# Patient Record
Sex: FEMALE | Race: WHITE | NOT HISPANIC OR LATINO | Employment: FULL TIME | URBAN - METROPOLITAN AREA
[De-identification: names, ages, dates, MRNs, and addresses within clinical notes are randomized per-mention and may not be internally consistent; named-entity substitution may affect disease eponyms.]

---

## 2017-01-11 ENCOUNTER — PRENATAL OFFICE VISIT (OUTPATIENT)
Dept: FAMILY MEDICINE | Facility: CLINIC | Age: 32
End: 2017-01-11
Payer: COMMERCIAL

## 2017-01-11 ENCOUNTER — HOSPITAL ENCOUNTER (OUTPATIENT)
Dept: ULTRASOUND IMAGING | Facility: CLINIC | Age: 32
Discharge: HOME | End: 2017-01-11
Attending: PHYSICIAN ASSISTANT | Admitting: PHYSICIAN ASSISTANT
Payer: COMMERCIAL

## 2017-01-11 VITALS
RESPIRATION RATE: 16 BRPM | BODY MASS INDEX: 31.08 KG/M2 | HEART RATE: 100 BPM | HEIGHT: 67 IN | WEIGHT: 198 LBS | DIASTOLIC BLOOD PRESSURE: 60 MMHG | SYSTOLIC BLOOD PRESSURE: 112 MMHG | TEMPERATURE: 97.9 F | OXYGEN SATURATION: 98 %

## 2017-01-11 DIAGNOSIS — O09.293 HISTORY OF OLIGOHYDRAMNIOS IN PRIOR PREGNANCY, CURRENTLY PREGNANT IN THIRD TRIMESTER: ICD-10-CM

## 2017-01-11 DIAGNOSIS — Z34.80 ENCOUNTER FOR SUPERVISION OF OTHER NORMAL PREGNANCY, UNSPECIFIED TRIMESTER: Primary | ICD-10-CM

## 2017-01-11 PROCEDURE — 76819 FETAL BIOPHYS PROFIL W/O NST: CPT

## 2017-01-11 PROCEDURE — 99207 ZZC PRENATAL VISIT: CPT | Performed by: FAMILY MEDICINE

## 2017-01-11 ASSESSMENT — PAIN SCALES - GENERAL: PAINLEVEL: NO PAIN (0)

## 2017-01-11 NOTE — PROGRESS NOTES
Taking PNVs, no problems. She had some pain and a HA yesterday but both have resolved today.  Her BPP was 8/8 today.  She is interested in having a sterilization procedure after her delivery this time.  This is her 3rd boy and she and her  have decided not to have any more children regardless of the fact that they have no girls.     SUBJECTIVE:  Paulette Singer is in for a consult for a tubal ligation, postpartum.  She is a   patient of Sona Borja [facility] who is referred to me by her.      ASSESSMENT:  1) 31 year old G 3,  at approximately 33-3/7 weeks gestation   with an EDC of 17.    2)  Undesired fertility.     RECOMMENDATIONS:   Postpartum bilateral tubal ligation through minilaparotomy with partial   salpingectomy.        If she were to have a primary low transverse  delivery for   whatever reason we could tie her tubes at that time also.      HISTORY:    31 year old multip who has had 2 previous vaginal deliveries, one in  at 41 wks and one in  at 35 wks due to oligohydramnios.    The patient is certain she does not want to have any more children after this delivery .  She and her  have talked about this and he is hesitant to have a vasectomy so she wants to do the tubal   ligation.  They do not want to use other forms of birth control and rechecked that including condoms, the pill, injection and IUD.      MEDICAL HISTORY  Past Medical History   Diagnosis Date     Migraines age 17     migraine/sinus headaches, better with excedrin     Plantar fasciitis 2009     See podiatry consult     Headache 2007     Low ferritin 2016     3rd pregnancy       SURGICAL HISTORY   Past Surgical History   Procedure Laterality Date     Hc ugi endoscopy, simple exam  2007     Pelvis laparoscopy,dx  2009     normal     Insert intrauterine device  2009     Mirena IUD under anesthetic     Hernia repair, inguinal rt/lt       bilateral as a baby     Mouth surgery        [place] teeth       ALLERGIES  Amoxicillin and Penicillins    CURRENT MEDICATIONS    Current outpatient prescriptions:      Prenatal Vit-Fe Fumarate-FA (PRENATAL MULTIVITAMIN  PLUS IRON) 27-0.8 MG TABS, Take 1 tablet by mouth daily, Disp: 100 tablet, Rfl: 3    SOCIAL HISTORY  Social History     Social History     Marital Status:      Spouse Name: N/A     Number of Children: 0     Years of Education: N/A     Occupational History     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 No      Service No     Blood Transfusions No     Caffeine Concern No     Occupational Exposure Yes     Holistic healing     Hobby Hazards No     Sleep Concern No     Stress Concern No     Weight Concern No     Special Diet No     Back Care No     Exercise Yes     Seat Belt Yes     Social History Narrative    Lives in [place] with , Timur and their two sons.  No concerns about domestic violence.  No smokers in the home.  No indoor cats/kittens.       FAMILY HISTORY  Family History   Problem Relation Age of Onset     Depression Mother      Thyroid Disease Mother      CANCER Paternal Grandmother      eye     Gynecology Sister      endometriosis       IMMUNIZATIONS  Immunization History   Administered Date(s) Administered     DTAP (<7y) 1987, 1985, 06/23/1988, 06/04/1990, 06/29/1992     IPV 1987, 1987, 06/04/1990, 06/29/1992     MMR 06/04/1990, 04/29/1999     TD (ADULT, 7+) 04/29/1999     TDAP (ADACEL AGES 11-64) 08/27/2009       I went through the risks of tubal ligation including  but not limited to pain and discomfort, estimated blood loss which is usually less than 5-10 cc, risk of infection, injury to the tube, uterus or other internal organs.      She was made aware that the main risk of a tubal ligation, no matter how well it is done is that of pregnancy, and that risk is higher for women who are less than 35 years of age.  The risk overall is anywhere from .3% or 3 out of 1000 women all the way up to 2 out of 1000 or 2%.      Of those women that get pregnant, 50% get pregnant in the tube itself or a tubal pregnancy and need a second surgical procedure.  She was aware that if she should miss a period or have any signs or symptoms of pregnancy, she should have a pregnancy test done.  If it is positive, she will need a beta hCG to quantify that and then a pelvic ultrasound to look for the location of the pregnancy.      Patient is willing to take these risks.  She does not want to have any more children.  She is also aware that to decrease her risk for getting pregnant she could use alternate forms of birth control during her most fertile period which would be 2-3 days on either side of ovulation which is anywhere from day 11 to day 17.  I showed the patient where the incision would be made, measuring approximately 1-2 inches in length, depending on patient's anatomy.  She is aware that her discomfort will be somewhat around the incision itself and then also laterally along the adnexal regions where the tubes would lie after completion of the tubal.    DISCUSSION:   Multiparous patient at.age requesting permanent sterilization.    She signed consent forms for her tubal ligation including the Federal form and those will be on file up in Labor and Delivery.  If she has further questions she will first talk with Sona Borja PAC and then follow up with me if he can not answer her questions.  She will otherwise follow up with Sona Borja PAC for the remainder of her prenatal care. I  will be there for her delivery and then schedule her for a PPBTL for the following day.  If for some reason she ends up having a ILTCS for delivery we can do the tubal at the same time.     Electronically signed by:  Rah Hill M.D.  1/11/2017

## 2017-01-11 NOTE — NURSING NOTE
"Chief Complaint   Patient presents with     Prenatal Care       Initial /60 mmHg  Pulse 100  Temp(Src) 97.9  F (36.6  C) (Temporal)  Resp 16  Ht 5' 7\" (1.702 m)  Wt 198 lb (89.812 kg)  BMI 31.00 kg/m2  SpO2 98%  LMP 05/12/2016 (Approximate) Estimated body mass index is 31 kg/(m^2) as calculated from the following:    Height as of this encounter: 5' 7\" (1.702 m).    Weight as of this encounter: 198 lb (89.812 kg).  BP completed using cuff size: robin Barrios MA 1/11/2017        "

## 2017-01-18 ENCOUNTER — HOSPITAL ENCOUNTER (OUTPATIENT)
Dept: ULTRASOUND IMAGING | Facility: CLINIC | Age: 32
Discharge: HOME | End: 2017-01-18
Attending: PHYSICIAN ASSISTANT | Admitting: PHYSICIAN ASSISTANT
Payer: COMMERCIAL

## 2017-01-18 DIAGNOSIS — O09.293 HISTORY OF OLIGOHYDRAMNIOS IN PRIOR PREGNANCY, CURRENTLY PREGNANT IN THIRD TRIMESTER: ICD-10-CM

## 2017-01-18 PROCEDURE — 76819 FETAL BIOPHYS PROFIL W/O NST: CPT

## 2017-01-25 ENCOUNTER — HOSPITAL ENCOUNTER (OUTPATIENT)
Dept: ULTRASOUND IMAGING | Facility: CLINIC | Age: 32
Discharge: HOME | End: 2017-01-25
Attending: PHYSICIAN ASSISTANT | Admitting: PHYSICIAN ASSISTANT
Payer: COMMERCIAL

## 2017-01-25 ENCOUNTER — PRENATAL OFFICE VISIT (OUTPATIENT)
Dept: FAMILY MEDICINE | Facility: CLINIC | Age: 32
End: 2017-01-25
Payer: COMMERCIAL

## 2017-01-25 VITALS
RESPIRATION RATE: 16 BRPM | SYSTOLIC BLOOD PRESSURE: 120 MMHG | HEART RATE: 88 BPM | TEMPERATURE: 97.8 F | BODY MASS INDEX: 31.63 KG/M2 | WEIGHT: 202 LBS | DIASTOLIC BLOOD PRESSURE: 70 MMHG

## 2017-01-25 DIAGNOSIS — Z34.80 ENCOUNTER FOR SUPERVISION OF OTHER NORMAL PREGNANCY, UNSPECIFIED TRIMESTER: Primary | ICD-10-CM

## 2017-01-25 DIAGNOSIS — O09.293 HISTORY OF OLIGOHYDRAMNIOS IN PRIOR PREGNANCY, CURRENTLY PREGNANT IN THIRD TRIMESTER: ICD-10-CM

## 2017-01-25 PROCEDURE — 59025 FETAL NON-STRESS TEST: CPT | Performed by: PHYSICIAN ASSISTANT

## 2017-01-25 PROCEDURE — 76819 FETAL BIOPHYS PROFIL W/O NST: CPT

## 2017-01-25 PROCEDURE — 99207 ZZC PRENATAL VISIT: CPT | Performed by: PHYSICIAN ASSISTANT

## 2017-01-25 ASSESSMENT — PAIN SCALES - GENERAL: PAINLEVEL: NO PAIN (0)

## 2017-01-25 NOTE — PROGRESS NOTES
"Doing well.  No concerns today. Continues to take PNV.  She met with Dr. Hill last OB visit and also discussed postpartum tubal ligation with him-signed consent forms for this. Today states  will likely have a vasectomy after learning all details surrounding tubal ligation.  NST today reactive 2/2. BPP done prior to my visit - awaiting interpretation - per patient baby did not breathe well & was told the BPP score was 6/8.  I would like to see the final radiology interpretation to make a final plan for follow up.  No vaginal bleeding, leakage, or contractions.  Baby's movement is active and frequent.  Discussed signs of labor and when to call or come in.   Discussed kick counts and fetal movement.  GBS next visit.  Please call if persistent HA, blurred vision, or swelling  She will report to OB if contractions every 5-10 minutes or if rupture of membranes.  RTC in 1 week    Son dx with pneumonia Monday - her lungs are feeling \"heavy\" with slight cough. No fevers, nightsweats. No hx of lung problems. Nonsmoker.  Lungs are clear today, mildly laryngitic. Likely a viral illness - tx supportively.    Electronically signed by Soan Borja PA-C  1/25/2017      "

## 2017-01-25 NOTE — NURSING NOTE
"Chief Complaint   Patient presents with     Prenatal Care     35w3d       Initial /70 mmHg  Pulse 88  Temp(Src) 97.8  F (36.6  C) (Tympanic)  Resp 16  Wt 202 lb (91.627 kg)  LMP 05/12/2016 (Approximate) Estimated body mass index is 31.63 kg/(m^2) as calculated from the following:    Height as of 1/11/17: 5' 7\" (1.702 m).    Weight as of this encounter: 202 lb (91.627 kg).  BP completed using cuff size: jay Landaverde CMA (AAMA)   "

## 2017-01-25 NOTE — PROGRESS NOTES
Quick Note:    Paulette - your biophysical returned with the findings discussed at your visit. Because this was not a perfect biophysical, but the nonstress tests look good, would like to repeat both studies on Friday. One of my partners should see you on Friday to do the nonstress test. I would like the BPP done prior to the visit if possible. I will have Francoise - my care  call you with a visit time.    Can you have her see one of the physician's who works on Friday - will need visit, NST & BPP (prior to visit).  ______

## 2017-01-26 ENCOUNTER — MYC MEDICAL ADVICE (OUTPATIENT)
Dept: FAMILY MEDICINE | Facility: CLINIC | Age: 32
End: 2017-01-26

## 2017-01-26 ENCOUNTER — TELEPHONE (OUTPATIENT)
Dept: FAMILY MEDICINE | Facility: CLINIC | Age: 32
End: 2017-01-26

## 2017-01-26 DIAGNOSIS — O09.293 HISTORY OF OLIGOHYDRAMNIOS IN PRIOR PREGNANCY, CURRENTLY PREGNANT IN THIRD TRIMESTER: Primary | ICD-10-CM

## 2017-01-26 NOTE — TELEPHONE ENCOUNTER
----- Message from Sona Borja PA-C sent at 1/25/2017  5:27 PM CST -----  Paulette - your biophysical returned with the findings discussed at your visit. Because this was not a perfect biophysical, but the nonstress tests look good, would like to repeat both studies on Friday. One of my partners should see you on Friday to do the nonstress test.  I would like the BPP done prior to the visit if possible. I will have Francoise - my care  call you with a visit time.    Can you have her see one of the physician's who works on Friday - will need visit, NST & BPP (prior to visit).

## 2017-01-27 ENCOUNTER — PRENATAL OFFICE VISIT (OUTPATIENT)
Dept: FAMILY MEDICINE | Facility: CLINIC | Age: 32
End: 2017-01-27
Payer: COMMERCIAL

## 2017-01-27 ENCOUNTER — HOSPITAL ENCOUNTER (OUTPATIENT)
Dept: ULTRASOUND IMAGING | Facility: CLINIC | Age: 32
Discharge: HOME | End: 2017-01-27
Attending: PHYSICIAN ASSISTANT | Admitting: PHYSICIAN ASSISTANT
Payer: COMMERCIAL

## 2017-01-27 VITALS
SYSTOLIC BLOOD PRESSURE: 120 MMHG | WEIGHT: 210 LBS | HEART RATE: 120 BPM | DIASTOLIC BLOOD PRESSURE: 78 MMHG | RESPIRATION RATE: 18 BRPM | TEMPERATURE: 98.4 F | OXYGEN SATURATION: 98 % | BODY MASS INDEX: 32.88 KG/M2

## 2017-01-27 DIAGNOSIS — O09.293 HISTORY OF OLIGOHYDRAMNIOS IN PRIOR PREGNANCY, CURRENTLY PREGNANT IN THIRD TRIMESTER: ICD-10-CM

## 2017-01-27 DIAGNOSIS — O09.293 HISTORY OF OLIGOHYDRAMNIOS IN PRIOR PREGNANCY, CURRENTLY PREGNANT IN THIRD TRIMESTER: Primary | ICD-10-CM

## 2017-01-27 DIAGNOSIS — Z34.80 ENCOUNTER FOR SUPERVISION OF OTHER NORMAL PREGNANCY, UNSPECIFIED TRIMESTER: ICD-10-CM

## 2017-01-27 PROCEDURE — 59025 FETAL NON-STRESS TEST: CPT | Performed by: FAMILY MEDICINE

## 2017-01-27 PROCEDURE — 99207 ZZC COMPLICATED OB VISIT: CPT | Performed by: FAMILY MEDICINE

## 2017-01-27 PROCEDURE — 76819 FETAL BIOPHYS PROFIL W/O NST: CPT

## 2017-01-27 ASSESSMENT — PAIN SCALES - GENERAL: PAINLEVEL: NO PAIN (0)

## 2017-01-27 NOTE — NURSING NOTE
"Chief Complaint   Patient presents with     Prenatal Care       Initial LMP 05/12/2016 (Approximate) Estimated body mass index is 31.63 kg/(m^2) as calculated from the following:    Height as of 1/11/17: 5' 7\" (1.702 m).    Weight as of 1/25/17: 202 lb (91.627 kg).  BP completed using cuff size: robin Peralta MA      "

## 2017-01-27 NOTE — PROGRESS NOTES
On 1-25-17 - Paulette had a reactive NST with a biophysical profile score of 6/8 (points off for breathing) - was requested by PCP to have follow up in 2 days, and the PCP is not in clinic today.  Doing well.  No concerns today.  Discussed signs of labor and when to call or come in.  Discussed kick counts and fetal movement.  NST done today and was reactive.  Biophysical profile ordered and reported to patient as 8/8 by sonographer.  Final report pending.    Reportable signs and symptoms discussed.  follow-up in with LACEY Hernandez for next OB Visit next week.    This document serves as a record of the services and decisions personally performed and made by Gino Zarate MD.It was created on his behalf by Vianca Prather,  trained medical scribes. The creation of this document is based the provider's statements to the medical scribe. January 27, 2017 12:09 PM    The information in this document, created by the medical scribe for me, accurately reflects the services I personally performed and the decisions made by me. I have reviewed and approved this document for accuracy.     Gino Zarate MD   [facility]

## 2017-02-01 ENCOUNTER — PRENATAL OFFICE VISIT (OUTPATIENT)
Dept: FAMILY MEDICINE | Facility: CLINIC | Age: 32
End: 2017-02-01
Payer: COMMERCIAL

## 2017-02-01 ENCOUNTER — DOCUMENTATION ONLY (OUTPATIENT)
Dept: FAMILY MEDICINE | Facility: CLINIC | Age: 32
End: 2017-02-01

## 2017-02-01 ENCOUNTER — HOSPITAL ENCOUNTER (OUTPATIENT)
Dept: ULTRASOUND IMAGING | Facility: CLINIC | Age: 32
Discharge: HOME | End: 2017-02-01
Attending: PHYSICIAN ASSISTANT | Admitting: PHYSICIAN ASSISTANT
Payer: COMMERCIAL

## 2017-02-01 VITALS
HEART RATE: 80 BPM | DIASTOLIC BLOOD PRESSURE: 64 MMHG | SYSTOLIC BLOOD PRESSURE: 117 MMHG | WEIGHT: 202 LBS | BODY MASS INDEX: 31.63 KG/M2 | RESPIRATION RATE: 16 BRPM

## 2017-02-01 DIAGNOSIS — O09.293 HISTORY OF OLIGOHYDRAMNIOS IN PRIOR PREGNANCY, CURRENTLY PREGNANT IN THIRD TRIMESTER: ICD-10-CM

## 2017-02-01 DIAGNOSIS — Z34.80 ENCOUNTER FOR SUPERVISION OF OTHER NORMAL PREGNANCY, UNSPECIFIED TRIMESTER: Primary | ICD-10-CM

## 2017-02-01 PROCEDURE — 76819 FETAL BIOPHYS PROFIL W/O NST: CPT

## 2017-02-01 PROCEDURE — 87653 STREP B DNA AMP PROBE: CPT | Performed by: PHYSICIAN ASSISTANT

## 2017-02-01 PROCEDURE — 99207 ZZC PRENATAL VISIT: CPT | Performed by: PHYSICIAN ASSISTANT

## 2017-02-01 PROCEDURE — 59025 FETAL NON-STRESS TEST: CPT | Performed by: PHYSICIAN ASSISTANT

## 2017-02-01 ASSESSMENT — PAIN SCALES - GENERAL: PAINLEVEL: NO PAIN (0)

## 2017-02-01 NOTE — NURSING NOTE
"Chief Complaint   Patient presents with     Prenatal Care     36w3d       Initial /64 mmHg  Pulse 80  Resp 16  Wt 202 lb (91.627 kg)  LMP 05/12/2016 (Approximate) Estimated body mass index is 31.63 kg/(m^2) as calculated from the following:    Height as of 1/11/17: 5' 7\" (1.702 m).    Weight as of this encounter: 202 lb (91.627 kg).  BP completed using cuff size: regular  Denisse Landaverde CMA (AAMA)   "

## 2017-02-01 NOTE — PROGRESS NOTES
This patient was a no show for Lab today. I have canceled and put the orders in for 2 week. Please contact the patient. Mable LEWIS

## 2017-02-01 NOTE — PROGRESS NOTES
Doing well.  No concerns today. Continues to take PNV.  NST reactive 2/2.  BPP 8/8 today.  No vaginal bleeding, leakage, or contractions.  Baby's movement is active and frequent.  Discussed signs of labor and when to call or come in.   Discussed kick counts and fetal movement.  GBS was done today.  Serum labs including hemoglobin and antitreponema done today as well.  Please call if persistent HA, blurred vision, or swelling  She will report to OB if contractions every 5-10 minutes or if rupture of membranes.  RTC in 1 week    Electronically signed by Sona Borja PA-C  2/1/2017

## 2017-02-02 LAB
GP B STREP DNA SPEC QL NAA+PROBE: NORMAL
SPECIMEN SOURCE: NORMAL

## 2017-02-02 NOTE — PROGRESS NOTES
Quick Note:    Your GBS was negative. There are a couple of blood tests that we will do next week.  ______

## 2017-02-08 ENCOUNTER — HOSPITAL ENCOUNTER (OUTPATIENT)
Dept: ULTRASOUND IMAGING | Facility: CLINIC | Age: 32
Discharge: HOME | End: 2017-02-08
Attending: PHYSICIAN ASSISTANT | Admitting: PHYSICIAN ASSISTANT
Payer: COMMERCIAL

## 2017-02-08 ENCOUNTER — PRENATAL OFFICE VISIT (OUTPATIENT)
Dept: FAMILY MEDICINE | Facility: CLINIC | Age: 32
End: 2017-02-08
Payer: COMMERCIAL

## 2017-02-08 VITALS
SYSTOLIC BLOOD PRESSURE: 122 MMHG | WEIGHT: 204 LBS | DIASTOLIC BLOOD PRESSURE: 60 MMHG | RESPIRATION RATE: 16 BRPM | BODY MASS INDEX: 31.94 KG/M2 | HEART RATE: 78 BPM | TEMPERATURE: 98.1 F

## 2017-02-08 DIAGNOSIS — Z34.80 ENCOUNTER FOR SUPERVISION OF OTHER NORMAL PREGNANCY, UNSPECIFIED TRIMESTER: Primary | ICD-10-CM

## 2017-02-08 DIAGNOSIS — O09.293 HISTORY OF OLIGOHYDRAMNIOS IN PRIOR PREGNANCY, CURRENTLY PREGNANT IN THIRD TRIMESTER: ICD-10-CM

## 2017-02-08 LAB — HGB BLD-MCNC: 12.1 G/DL (ref 11.7–15.7)

## 2017-02-08 PROCEDURE — 99207 ZZC PRENATAL VISIT: CPT | Performed by: PHYSICIAN ASSISTANT

## 2017-02-08 PROCEDURE — 59025 FETAL NON-STRESS TEST: CPT | Performed by: PHYSICIAN ASSISTANT

## 2017-02-08 PROCEDURE — 00000218 ZZHCL STATISTIC OBHBG - HEMOGLOBIN: Performed by: PHYSICIAN ASSISTANT

## 2017-02-08 PROCEDURE — 86780 TREPONEMA PALLIDUM: CPT | Performed by: PHYSICIAN ASSISTANT

## 2017-02-08 PROCEDURE — 36415 COLL VENOUS BLD VENIPUNCTURE: CPT | Performed by: PHYSICIAN ASSISTANT

## 2017-02-08 PROCEDURE — 76819 FETAL BIOPHYS PROFIL W/O NST: CPT

## 2017-02-08 ASSESSMENT — PAIN SCALES - GENERAL: PAINLEVEL: MILD PAIN (2)

## 2017-02-08 NOTE — PROGRESS NOTES
Doing well.  No concerns today other than increased swelling lower legs. 1+ noted today. Nonpitting edema. Support stockings encouraged.  BPP 8/8 today & NST 2/2 reactive. (doing these due to previous hx of oligohydramnios with previous 2 pregnancies)   Continues to take PNV.  No vaginal bleeding, leakage, but did note some contractions q 2 min yesterday - tapered off into the evening.  Baby's movement is active and frequent.  Discussed signs of labor and when to call or come in.   Discussed kick counts and fetal movement.  GBS returned  negative.  Please call if persistent HA, blurred vision, or swelling  She will report to OB if contractions every 5-10 minutes or if rupture of membranes.  RTC in 1 week    Electronically signed by Sona Borja PA-C  2/8/2017

## 2017-02-08 NOTE — NURSING NOTE
"Chief Complaint   Patient presents with     Prenatal Care     37w3d       Initial /60 mmHg  Pulse 78  Temp(Src) 98.1  F (36.7  C) (Tympanic)  Resp 16  Wt 204 lb (92.534 kg)  LMP 05/12/2016 (Approximate) Estimated body mass index is 31.94 kg/(m^2) as calculated from the following:    Height as of 1/11/17: 5' 7\" (1.702 m).    Weight as of this encounter: 204 lb (92.534 kg).  Medication Reconciliation: complete   Dneisse Landaverde CMA (AAMA)   "

## 2017-02-09 LAB — T PALLIDUM IGG+IGM SER QL: NEGATIVE

## 2017-02-15 ENCOUNTER — HOSPITAL ENCOUNTER (OUTPATIENT)
Dept: ULTRASOUND IMAGING | Facility: CLINIC | Age: 32
Discharge: HOME | End: 2017-02-15
Attending: PHYSICIAN ASSISTANT | Admitting: PHYSICIAN ASSISTANT
Payer: COMMERCIAL

## 2017-02-15 ENCOUNTER — PRENATAL OFFICE VISIT (OUTPATIENT)
Dept: FAMILY MEDICINE | Facility: CLINIC | Age: 32
End: 2017-02-15
Payer: COMMERCIAL

## 2017-02-15 ENCOUNTER — TELEPHONE (OUTPATIENT)
Dept: OBGYN | Facility: CLINIC | Age: 32
End: 2017-02-15

## 2017-02-15 VITALS
WEIGHT: 207.5 LBS | TEMPERATURE: 98.6 F | SYSTOLIC BLOOD PRESSURE: 110 MMHG | BODY MASS INDEX: 32.5 KG/M2 | DIASTOLIC BLOOD PRESSURE: 72 MMHG | HEART RATE: 76 BPM

## 2017-02-15 DIAGNOSIS — O09.293 HISTORY OF OLIGOHYDRAMNIOS IN PRIOR PREGNANCY, CURRENTLY PREGNANT IN THIRD TRIMESTER: ICD-10-CM

## 2017-02-15 PROCEDURE — 76819 FETAL BIOPHYS PROFIL W/O NST: CPT

## 2017-02-15 PROCEDURE — 99207 ZZC PRENATAL VISIT: CPT | Performed by: PHYSICIAN ASSISTANT

## 2017-02-15 PROCEDURE — 59025 FETAL NON-STRESS TEST: CPT | Performed by: PHYSICIAN ASSISTANT

## 2017-02-15 ASSESSMENT — PAIN SCALES - GENERAL: PAINLEVEL: NO PAIN (0)

## 2017-02-15 NOTE — PROGRESS NOTES
Doing well.  No concerns today. BPP is 8/8 & NST is reactive 2/2.  She will be seeing Dr. Hill next 2 weeks. Continues to take PNV.  No vaginal bleeding, leakage, or contractions.  Baby's movement is active and frequent.  Discussed signs of labor and when to call or come in.   Discussed kick counts and fetal movement.  Please call if persistent HA, blurred vision, or swelling  She will report to OB if contractions every 5-10 minutes or if rupture of membranes.  RTC in 1 week    Electronically signed by Sona Borja PA-C  2/15/2017

## 2017-02-15 NOTE — NURSING NOTE
"Chief Complaint   Patient presents with     Prenatal Care     38w3d       Initial /72  Pulse 76  Temp 98.6  F (37  C) (Tympanic)  Wt 207 lb 8 oz (94.1 kg)  LMP 05/12/2016 (Approximate)  BMI 32.5 kg/m2 Estimated body mass index is 32.5 kg/(m^2) as calculated from the following:    Height as of 1/11/17: 5' 7\" (1.702 m).    Weight as of this encounter: 207 lb 8 oz (94.1 kg).  Medication Reconciliation: complete   Denisse Landaverde CMA (AAMA)   "

## 2017-02-15 NOTE — MR AVS SNAPSHOT
After Visit Summary   2/15/2017    Paulette Singer    MRN: [id number]           Patient Information     Date Of Birth          1985        Visit Information        Provider Department      2/15/2017 10:00 AM Sona Borja PA-C [facility]        Today's Diagnoses     History of oligohydramnios in prior pregnancy, currently pregnant in third trimester           Follow-ups after your visit        Your next 10 appointments already scheduled     Feb 22, 2017  9:00 AM CST   US FETAL BIOPHYS PROFILE W/O NON STRESS TEST with PHUS1   [facility] ([facility])    [address] [place] [address]   [phone]           Please bring a list of your medicines (including vitamins, minerals and over-the-counter drugs). Also, tell your doctor about any allergies you may have. Wear comfortable clothes and leave your valuables at home.  If you re less than 20 weeks drink four 8-ounce glasses of fluid an hour before your exam. If you need to empty your bladder before your exam, try to release only a little urine. Then, drink another glass of fluid.  You may have up to two family members in the exam room. If you bring a small child, an adult must be there to care for him or her.  Please call the Imaging Department at your exam site with any questions.            Feb 22, 2017  9:40 AM CST   ESTABLISHED PRENATAL with Rah Hill MD   [facility] ([facility]    [address] [facility] [address]   [phone]            Mar 01, 2017  9:15 AM CST   US FETAL BIOPHYS PROFILE W/O NON STRESS TEST with PHUS1   [facility] ([facility])    [address] [place] [address]   [phone]           Please bring a list of your medicines (including vitamins, minerals and over-the-counter drugs). Also, tell your doctor about any allergies you may have. Wear comfortable  clothes and leave your valuables at home.  If you re less than 20 weeks drink four 8-ounce glasses of fluid an hour before your exam. If you need to empty your bladder before your exam, try to release only a little urine. Then, drink another glass of fluid.  You may have up to two family members in the exam room. If you bring a small child, an adult must be there to care for him or her.  Please call the Imaging Department at your exam site with any questions.            Mar 01, 2017 10:20 AM CST   ESTABLISHED PRENATAL with Rah Hill MD   [facility]    [address] [place] [id number] [phone]              Who to contact     If you have questions or need follow up information about today's clinic visit or your schedule please contact [facility] directly at [phone].  Normal or non-critical lab and imaging results will be communicated to you by MyChart, letter or phone within 4 business days after the clinic has received the results. If you do not hear from us within 7 days, please contact the clinic through [employer] or phone. If you have a critical or abnormal lab result, we will notify you by phone as soon as possible.  Submit refill requests through "[employer] or call your pharmacy and they will forward the refill request to us. Please allow 3 business days for your refill to be completed.          Additional Information About Your Visit        [employer] Information     "[employer] gives you secure access to your electronic health record. If you see a primary care provider, you can also send messages to your care team and make appointments. If you have questions, please call your primary care clinic.  If you do not have a primary care provider, please call [phone] and they will assist you.        Care EveryWhere ID     This is your Care EveryWhere ID. This could be used by other organizations to access your [facility]  records  [id number]        Your Vitals Were     Pulse Temperature Last Period BMI (Body Mass Index)          76 98.6  F (37  C) (Tympanic) 05/12/2016 (Approximate) 32.5 kg/m2         Blood Pressure from Last 3 Encounters:   02/15/17 110/72   02/08/17 122/60   02/01/17 117/64    Weight from Last 3 Encounters:   02/15/17 207 lb 8 oz (94.1 kg)   02/08/17 204 lb (92.5 kg)   02/01/17 202 lb (91.6 kg)              We Performed the Following     FETAL NON-STRESS TEST        Primary Care Provider Office Phone # Fax #    Sona Borja PA-C [phone]       [address] [place] [address]        Thank you!     Thank you for choosing [facility]  for your care. Our goal is always to provide you with excellent care. Hearing back from our patients is one way we can continue to improve our services. Please take a few minutes to complete the written survey that you may receive in the mail after your visit with us. Thank you!             Your Updated Medication List - Protect others around you: Learn how to safely use, store and throw away your medicines at www.disposemymeds.org.          This list is accurate as of: 2/15/17 12:44 PM.  Always use your most recent med list.                   Brand Name Dispense Instructions for use    prenatal multivitamin  plus iron 27-0.8 MG Tabs per tablet     100 tablet    Take 1 tablet by mouth daily

## 2017-02-16 ENCOUNTER — TELEPHONE (OUTPATIENT)
Dept: OBGYN | Facility: CLINIC | Age: 32
End: 2017-02-16

## 2017-02-16 NOTE — TELEPHONE ENCOUNTER
"Pt called stating \"I've been claudia all day but the contractions are not very strong\".  Denies bleeding, denies leaking of fluid & having good fetal movements.  After speaking with the pt twice, she has choose to stay home & do more of her labor at home.  Pt encouraged to call with any concerns, leaking of fluid or bleeding or when coming to the birthplace.  Pt agreed with plan of care.  "

## 2017-02-22 ENCOUNTER — PRENATAL OFFICE VISIT (OUTPATIENT)
Dept: FAMILY MEDICINE | Facility: CLINIC | Age: 32
End: 2017-02-22
Payer: COMMERCIAL

## 2017-02-22 VITALS
SYSTOLIC BLOOD PRESSURE: 120 MMHG | WEIGHT: 209 LBS | RESPIRATION RATE: 18 BRPM | OXYGEN SATURATION: 100 % | DIASTOLIC BLOOD PRESSURE: 78 MMHG | BODY MASS INDEX: 32.73 KG/M2 | HEART RATE: 100 BPM | TEMPERATURE: 98 F

## 2017-02-22 DIAGNOSIS — O09.293 HISTORY OF OLIGOHYDRAMNIOS IN PRIOR PREGNANCY, CURRENTLY PREGNANT IN THIRD TRIMESTER: ICD-10-CM

## 2017-02-22 PROCEDURE — 99207 ZZC COMPLICATED OB VISIT: CPT | Performed by: FAMILY MEDICINE

## 2017-02-22 PROCEDURE — 59025 FETAL NON-STRESS TEST: CPT | Performed by: FAMILY MEDICINE

## 2017-02-22 RX ORDER — ACETAMINOPHEN 325 MG/1
650 TABLET ORAL EVERY 4 HOURS PRN
Status: CANCELLED | OUTPATIENT
Start: 2017-02-22

## 2017-02-22 RX ORDER — IBUPROFEN 800 MG/1
800 TABLET, FILM COATED ORAL
Status: CANCELLED | OUTPATIENT
Start: 2017-02-22

## 2017-02-22 RX ORDER — OXYCODONE AND ACETAMINOPHEN 5; 325 MG/1; MG/1
1 TABLET ORAL
Status: CANCELLED | OUTPATIENT
Start: 2017-02-22

## 2017-02-22 RX ORDER — OXYTOCIN/0.9 % SODIUM CHLORIDE 30/500 ML
1-24 PLASTIC BAG, INJECTION (ML) INTRAVENOUS CONTINUOUS
Status: CANCELLED | OUTPATIENT
Start: 2017-02-22

## 2017-02-22 RX ORDER — LIDOCAINE 40 MG/G
CREAM TOPICAL
Status: CANCELLED | OUTPATIENT
Start: 2017-02-22

## 2017-02-22 RX ORDER — SODIUM CHLORIDE, SODIUM LACTATE, POTASSIUM CHLORIDE, CALCIUM CHLORIDE 600; 310; 30; 20 MG/100ML; MG/100ML; MG/100ML; MG/100ML
INJECTION, SOLUTION INTRAVENOUS CONTINUOUS
Status: CANCELLED | OUTPATIENT
Start: 2017-02-22

## 2017-02-22 RX ORDER — METHYLERGONOVINE MALEATE 0.2 MG/ML
200 INJECTION INTRAVENOUS
Status: CANCELLED | OUTPATIENT
Start: 2017-02-22

## 2017-02-22 RX ORDER — NALOXONE HYDROCHLORIDE 0.4 MG/ML
.1-.4 INJECTION, SOLUTION INTRAMUSCULAR; INTRAVENOUS; SUBCUTANEOUS
Status: CANCELLED | OUTPATIENT
Start: 2017-02-22

## 2017-02-22 RX ORDER — ONDANSETRON 2 MG/ML
4 INJECTION INTRAMUSCULAR; INTRAVENOUS EVERY 6 HOURS PRN
Status: CANCELLED | OUTPATIENT
Start: 2017-02-22

## 2017-02-22 RX ORDER — OXYTOCIN/0.9 % SODIUM CHLORIDE 30/500 ML
100-340 PLASTIC BAG, INJECTION (ML) INTRAVENOUS CONTINUOUS PRN
Status: CANCELLED | OUTPATIENT
Start: 2017-02-22

## 2017-02-22 RX ORDER — OXYTOCIN 10 [USP'U]/ML
10 INJECTION, SOLUTION INTRAMUSCULAR; INTRAVENOUS
Status: CANCELLED | OUTPATIENT
Start: 2017-02-22

## 2017-02-22 RX ORDER — CARBOPROST TROMETHAMINE 250 UG/ML
250 INJECTION, SOLUTION INTRAMUSCULAR
Status: CANCELLED | OUTPATIENT
Start: 2017-02-22

## 2017-02-22 NOTE — MR AVS SNAPSHOT
After Visit Summary   2/22/2017    Paulette Singer    MRN: [id number]           Patient Information     Date Of Birth          1985        Visit Information        Provider Department      2/22/2017 9:40 AM Rah Hill MD [facility]        Today's Diagnoses     History of oligohydramnios in prior pregnancy, currently pregnant in third trimester           Follow-ups after your visit        Your next 10 appointments already scheduled     Mar 01, 2017  9:15 AM CST   US FETAL BIOPHYS PROFILE W/O NON STRESS TEST with PHUS1   [facility]ound ([facility])    [address] [place] [id number] [phone]           Please bring a list of your medicines (including vitamins, minerals and over-the-counter drugs). Also, tell your doctor about any allergies you may have. Wear comfortable clothes and leave your valuables at home.  If you re less than 20 weeks drink four 8-ounce glasses of fluid an hour before your exam. If you need to empty your bladder before your exam, try to release only a little urine. Then, drink another glass of fluid.  You may have up to two family members in the exam room. If you bring a small child, an adult must be there to care for him or her.  Please call the Imaging Department at your exam site with any questions.            Mar 01, 2017 10:20 AM CST   ESTABLISHED PRENATAL with Rah Hill MD   [facility] ([facility]    [address] [facility] [id number] [phone]              Who to contact     If you have questions or need follow up information about today's clinic visit or your schedule please contact [facility] directly at [phone].  Normal or non-critical lab and imaging results will be communicated to you by MyChart, letter or phone within 4 business days after the clinic has received the results. If you do not hear from us within 7  days, please contact the clinic through [employer] or phone. If you have a critical or abnormal lab result, we will notify you by phone as soon as possible.  Submit refill requests through [employer] or call your pharmacy and they will forward the refill request to us. Please allow 3 business days for your refill to be completed.          Additional Information About Your Visit        [employer] Information     [employer] gives you secure access to your electronic health record. If you see a primary care provider, you can also send messages to your care team and make appointments. If you have questions, please call your primary care clinic.  If you do not have a primary care provider, please call [phone] and they will assist you.        Care EveryWhere ID     This is your Care EveryWhere ID. This could be used by other organizations to access your [facility] medical records  [id number]        Your Vitals Were     Pulse Temperature Respirations Last Period Pulse Oximetry BMI (Body Mass Index)    100 98  F (36.7  C) (Temporal) 18 05/12/2016 (Approximate) 100% 32.73 kg/m2       Blood Pressure from Last 3 Encounters:   02/22/17 120/78   02/15/17 110/72   02/08/17 122/60    Weight from Last 3 Encounters:   02/22/17 209 lb (94.8 kg)   02/15/17 207 lb 8 oz (94.1 kg)   02/08/17 204 lb (92.5 kg)              We Performed the Following     FETAL NON-STRESS TEST        Primary Care Provider Office Phone # Fax #    Sona Borja PA-C [phone]       [address] [place] [address]        Thank you!     Thank you for choosing [facility]  for your care. Our goal is always to provide you with excellent care. Hearing back from our patients is one way we can continue to improve our services. Please take a few minutes to complete the written survey that you may receive in the mail after your visit with us. Thank you!             Your Updated Medication List - Protect others  around you: Learn how to safely use, store and throw away your medicines at www.disposemymeds.org.          This list is accurate as of: 2/22/17 12:42 PM.  Always use your most recent med list.                   Brand Name Dispense Instructions for use    prenatal multivitamin  plus iron 27-0.8 MG Tabs per tablet     100 tablet    Take 1 tablet by mouth daily

## 2017-02-22 NOTE — NURSING NOTE
"Chief Complaint   Patient presents with     Prenatal Care     39 weeks 3 days       Initial /78 (BP Location: Right arm, Patient Position: Chair, Cuff Size: Adult Regular)  Pulse 100  Temp 98  F (36.7  C) (Temporal)  Resp 18  Wt 209 lb (94.8 kg)  LMP 05/12/2016 (Approximate)  SpO2 100%  BMI 32.73 kg/m2 Estimated body mass index is 32.73 kg/(m^2) as calculated from the following:    Height as of 1/11/17: 5' 7\" (1.702 m).    Weight as of this encounter: 209 lb (94.8 kg).  Medication Reconciliation: complete     Rukhsana Schneider CMA      "

## 2017-02-22 NOTE — PROGRESS NOTES
Taking PNVs, no problems.  She is miserable with pelvic relaxation and can barely get out of bed or up out of a chair.  She is requesting an induction.  She has had a history of Low AFIs and has been getting weekly BPPs.  Fluid has been low normal but adequate.  Her Valdez score today is 7 so has a favorable cervix for induction with pitocin.  We will do this tomorrow am and have her arrive at 06:30 am to L&D.  She is aware that with a Valdez score >6 there is no increased risk for induction and the outcomes will not change ie vaginal delivery vs CS.  She has had 2 previous vaginal deliveries so her risk is low for a failed induction.  Her NST today was nicely reactive with a baseline of 120 and multiple accelerations no decelerations. She was contraction almost every minute but not feeling them at all.  Signs and symptoms of labor were reviewed.      Electronically signed by:  Rah Hill M.D.  2/22/2017

## 2017-02-23 ENCOUNTER — HOSPITAL ENCOUNTER (INPATIENT)
Facility: CLINIC | Age: 32
LOS: 2 days | Discharge: HOME | End: 2017-02-25
Attending: FAMILY MEDICINE | Admitting: FAMILY MEDICINE
Payer: COMMERCIAL

## 2017-02-23 ENCOUNTER — ANESTHESIA EVENT (OUTPATIENT)
Dept: OBGYN | Facility: CLINIC | Age: 32
End: 2017-02-23
Payer: COMMERCIAL

## 2017-02-23 ENCOUNTER — ANESTHESIA (OUTPATIENT)
Dept: OBGYN | Facility: CLINIC | Age: 32
End: 2017-02-23
Payer: COMMERCIAL

## 2017-02-23 PROBLEM — Z34.90 PREGNANCY: Status: ACTIVE | Noted: 2017-02-23

## 2017-02-23 LAB
ABO + RH BLD: NORMAL
ABO + RH BLD: NORMAL
SPECIMEN EXP DATE BLD: NORMAL

## 2017-02-23 PROCEDURE — 25800025 ZZH RX 258: Performed by: FAMILY MEDICINE

## 2017-02-23 PROCEDURE — 10907ZC DRAINAGE OF AMNIOTIC FLUID, THERAPEUTIC FROM PRODUCTS OF CONCEPTION, VIA NATURAL OR ARTIFICIAL OPENING: ICD-10-PCS | Performed by: FAMILY MEDICINE

## 2017-02-23 PROCEDURE — 12000031 ZZH R&B OB CRITICAL

## 2017-02-23 PROCEDURE — 37000011 ZZH ANESTHESIA WARD SERVICE: Performed by: NURSE ANESTHETIST, CERTIFIED REGISTERED

## 2017-02-23 PROCEDURE — 59400 OBSTETRICAL CARE: CPT | Performed by: FAMILY MEDICINE

## 2017-02-23 PROCEDURE — 3E033VJ INTRODUCTION OF OTHER HORMONE INTO PERIPHERAL VEIN, PERCUTANEOUS APPROACH: ICD-10-PCS | Performed by: FAMILY MEDICINE

## 2017-02-23 PROCEDURE — 25800025 ZZH RX 258: Performed by: NURSE ANESTHETIST, CERTIFIED REGISTERED

## 2017-02-23 PROCEDURE — 86901 BLOOD TYPING SEROLOGIC RH(D): CPT | Performed by: FAMILY MEDICINE

## 2017-02-23 PROCEDURE — 40000671 ZZH STATISTIC ANESTHESIA CASE

## 2017-02-23 PROCEDURE — 25000125 ZZHC RX 250

## 2017-02-23 PROCEDURE — 3E0S3CZ INTRODUCTION OF REGIONAL ANESTHETIC INTO EPIDURAL SPACE, PERCUTANEOUS APPROACH: ICD-10-PCS | Performed by: FAMILY MEDICINE

## 2017-02-23 PROCEDURE — 00HU33Z INSERTION OF INFUSION DEVICE INTO SPINAL CANAL, PERCUTANEOUS APPROACH: ICD-10-PCS | Performed by: FAMILY MEDICINE

## 2017-02-23 PROCEDURE — 86900 BLOOD TYPING SEROLOGIC ABO: CPT | Performed by: FAMILY MEDICINE

## 2017-02-23 PROCEDURE — 25000125 ZZHC RX 250: Performed by: FAMILY MEDICINE

## 2017-02-23 PROCEDURE — 25000125 ZZHC RX 250: Performed by: NURSE ANESTHETIST, CERTIFIED REGISTERED

## 2017-02-23 PROCEDURE — 72200001 ZZH LABOR CARE VAGINAL DELIVERY SINGLE

## 2017-02-23 RX ORDER — IBUPROFEN 400 MG/1
400-800 TABLET, FILM COATED ORAL EVERY 6 HOURS PRN
Status: DISCONTINUED | OUTPATIENT
Start: 2017-02-23 | End: 2017-02-25 | Stop reason: HOSPADM

## 2017-02-23 RX ORDER — OXYTOCIN/0.9 % SODIUM CHLORIDE 30/500 ML
100-340 PLASTIC BAG, INJECTION (ML) INTRAVENOUS CONTINUOUS PRN
Status: DISCONTINUED | OUTPATIENT
Start: 2017-02-23 | End: 2017-02-23

## 2017-02-23 RX ORDER — METHYLERGONOVINE MALEATE 0.2 MG/ML
200 INJECTION INTRAVENOUS
Status: DISCONTINUED | OUTPATIENT
Start: 2017-02-23 | End: 2017-02-23

## 2017-02-23 RX ORDER — IBUPROFEN 800 MG/1
800 TABLET, FILM COATED ORAL
Status: DISCONTINUED | OUTPATIENT
Start: 2017-02-23 | End: 2017-02-23

## 2017-02-23 RX ORDER — LIDOCAINE HYDROCHLORIDE AND EPINEPHRINE 15; 5 MG/ML; UG/ML
INJECTION, SOLUTION EPIDURAL PRN
Status: DISCONTINUED | OUTPATIENT
Start: 2017-02-23 | End: 2017-02-24

## 2017-02-23 RX ORDER — NALOXONE HYDROCHLORIDE 0.4 MG/ML
.1-.4 INJECTION, SOLUTION INTRAMUSCULAR; INTRAVENOUS; SUBCUTANEOUS
Status: DISCONTINUED | OUTPATIENT
Start: 2017-02-23 | End: 2017-02-25 | Stop reason: HOSPADM

## 2017-02-23 RX ORDER — BISACODYL 10 MG
10 SUPPOSITORY, RECTAL RECTAL DAILY PRN
Status: DISCONTINUED | OUTPATIENT
Start: 2017-02-25 | End: 2017-02-25 | Stop reason: HOSPADM

## 2017-02-23 RX ORDER — IBUPROFEN 800 MG/1
800 TABLET, FILM COATED ORAL EVERY 6 HOURS PRN
Status: DISCONTINUED | OUTPATIENT
Start: 2017-02-23 | End: 2017-02-23

## 2017-02-23 RX ORDER — NALBUPHINE HYDROCHLORIDE 10 MG/ML
2.5-5 INJECTION, SOLUTION INTRAMUSCULAR; INTRAVENOUS; SUBCUTANEOUS EVERY 6 HOURS PRN
Status: DISCONTINUED | OUTPATIENT
Start: 2017-02-23 | End: 2017-02-23

## 2017-02-23 RX ORDER — SODIUM CHLORIDE, SODIUM LACTATE, POTASSIUM CHLORIDE, CALCIUM CHLORIDE 600; 310; 30; 20 MG/100ML; MG/100ML; MG/100ML; MG/100ML
INJECTION, SOLUTION INTRAVENOUS CONTINUOUS
Status: DISCONTINUED | OUTPATIENT
Start: 2017-02-23 | End: 2017-02-23

## 2017-02-23 RX ORDER — NALOXONE HYDROCHLORIDE 0.4 MG/ML
.1-.4 INJECTION, SOLUTION INTRAMUSCULAR; INTRAVENOUS; SUBCUTANEOUS
Status: DISCONTINUED | OUTPATIENT
Start: 2017-02-23 | End: 2017-02-23

## 2017-02-23 RX ORDER — ONDANSETRON 4 MG/1
4 TABLET, ORALLY DISINTEGRATING ORAL EVERY 6 HOURS PRN
Status: DISCONTINUED | OUTPATIENT
Start: 2017-02-23 | End: 2017-02-23

## 2017-02-23 RX ORDER — LANOLIN 100 %
OINTMENT (GRAM) TOPICAL
Status: DISCONTINUED | OUTPATIENT
Start: 2017-02-23 | End: 2017-02-25 | Stop reason: HOSPADM

## 2017-02-23 RX ORDER — LIDOCAINE HYDROCHLORIDE AND EPINEPHRINE 15; 5 MG/ML; UG/ML
3 INJECTION, SOLUTION EPIDURAL
Status: DISCONTINUED | OUTPATIENT
Start: 2017-02-23 | End: 2017-02-23

## 2017-02-23 RX ORDER — EPHEDRINE SULFATE 50 MG/ML
5 INJECTION, SOLUTION INTRAMUSCULAR; INTRAVENOUS; SUBCUTANEOUS
Status: DISCONTINUED | OUTPATIENT
Start: 2017-02-23 | End: 2017-02-23

## 2017-02-23 RX ORDER — OXYTOCIN/0.9 % SODIUM CHLORIDE 30/500 ML
1-24 PLASTIC BAG, INJECTION (ML) INTRAVENOUS CONTINUOUS
Status: DISCONTINUED | OUTPATIENT
Start: 2017-02-23 | End: 2017-02-23

## 2017-02-23 RX ORDER — LIDOCAINE HYDROCHLORIDE 10 MG/ML
INJECTION, SOLUTION EPIDURAL; INFILTRATION; INTRACAUDAL; PERINEURAL
Status: COMPLETED
Start: 2017-02-23 | End: 2017-02-23

## 2017-02-23 RX ORDER — BUPIVACAINE HYDROCHLORIDE 2.5 MG/ML
INJECTION, SOLUTION EPIDURAL; INFILTRATION; INTRACAUDAL PRN
Status: DISCONTINUED | OUTPATIENT
Start: 2017-02-23 | End: 2017-02-24

## 2017-02-23 RX ORDER — OXYTOCIN/0.9 % SODIUM CHLORIDE 30/500 ML
100 PLASTIC BAG, INJECTION (ML) INTRAVENOUS CONTINUOUS
Status: DISCONTINUED | OUTPATIENT
Start: 2017-02-23 | End: 2017-02-25 | Stop reason: HOSPADM

## 2017-02-23 RX ORDER — ACETAMINOPHEN 325 MG/1
650 TABLET ORAL EVERY 4 HOURS PRN
Status: DISCONTINUED | OUTPATIENT
Start: 2017-02-23 | End: 2017-02-23

## 2017-02-23 RX ORDER — OXYTOCIN/0.9 % SODIUM CHLORIDE 30/500 ML
340 PLASTIC BAG, INJECTION (ML) INTRAVENOUS CONTINUOUS PRN
Status: DISCONTINUED | OUTPATIENT
Start: 2017-02-23 | End: 2017-02-24

## 2017-02-23 RX ORDER — ONDANSETRON 2 MG/ML
4 INJECTION INTRAMUSCULAR; INTRAVENOUS EVERY 6 HOURS PRN
Status: DISCONTINUED | OUTPATIENT
Start: 2017-02-23 | End: 2017-02-23

## 2017-02-23 RX ORDER — ACETAMINOPHEN 325 MG/1
650 TABLET ORAL EVERY 4 HOURS PRN
Status: DISCONTINUED | OUTPATIENT
Start: 2017-02-23 | End: 2017-02-25 | Stop reason: HOSPADM

## 2017-02-23 RX ORDER — OXYCODONE AND ACETAMINOPHEN 5; 325 MG/1; MG/1
1 TABLET ORAL
Status: DISCONTINUED | OUTPATIENT
Start: 2017-02-23 | End: 2017-02-23

## 2017-02-23 RX ORDER — MISOPROSTOL 200 UG/1
400 TABLET ORAL
Status: DISCONTINUED | OUTPATIENT
Start: 2017-02-23 | End: 2017-02-24

## 2017-02-23 RX ORDER — OXYTOCIN 10 [USP'U]/ML
10 INJECTION, SOLUTION INTRAMUSCULAR; INTRAVENOUS
Status: DISCONTINUED | OUTPATIENT
Start: 2017-02-23 | End: 2017-02-23

## 2017-02-23 RX ORDER — CARBOPROST TROMETHAMINE 250 UG/ML
250 INJECTION, SOLUTION INTRAMUSCULAR
Status: DISCONTINUED | OUTPATIENT
Start: 2017-02-23 | End: 2017-02-23

## 2017-02-23 RX ORDER — OXYTOCIN 10 [USP'U]/ML
10 INJECTION, SOLUTION INTRAMUSCULAR; INTRAVENOUS
Status: DISCONTINUED | OUTPATIENT
Start: 2017-02-23 | End: 2017-02-24

## 2017-02-23 RX ORDER — AMOXICILLIN 250 MG
1-2 CAPSULE ORAL 2 TIMES DAILY
Status: DISCONTINUED | OUTPATIENT
Start: 2017-02-23 | End: 2017-02-25 | Stop reason: HOSPADM

## 2017-02-23 RX ORDER — LIDOCAINE 40 MG/G
CREAM TOPICAL
Status: DISCONTINUED | OUTPATIENT
Start: 2017-02-23 | End: 2017-02-23

## 2017-02-23 RX ORDER — HYDROCORTISONE 2.5 %
CREAM (GRAM) TOPICAL 3 TIMES DAILY PRN
Status: DISCONTINUED | OUTPATIENT
Start: 2017-02-23 | End: 2017-02-25 | Stop reason: HOSPADM

## 2017-02-23 RX ADMIN — BUPIVACAINE HYDROCHLORIDE 5 ML: 2.5 INJECTION, SOLUTION EPIDURAL; INFILTRATION; INTRACAUDAL at 14:26

## 2017-02-23 RX ADMIN — LIDOCAINE HYDROCHLORIDE,EPINEPHRINE BITARTRATE 3 ML: 15; .005 INJECTION, SOLUTION EPIDURAL; INFILTRATION; INTRACAUDAL; PERINEURAL at 14:24

## 2017-02-23 RX ADMIN — LIDOCAINE HYDROCHLORIDE 0.1 ML: 10 INJECTION, SOLUTION EPIDURAL; INFILTRATION; INTRACAUDAL; PERINEURAL at 07:00

## 2017-02-23 RX ADMIN — BUPIVACAINE HYDROCHLORIDE 5 ML: 2.5 INJECTION, SOLUTION EPIDURAL; INFILTRATION; INTRACAUDAL at 14:28

## 2017-02-23 RX ADMIN — Medication 10 ML/HR: at 14:34

## 2017-02-23 RX ADMIN — OXYTOCIN-SODIUM CHLORIDE 0.9% IV SOLN 30 UNIT/500ML 1 MILLI-UNITS/MIN: 30-0.9/5 SOLUTION at 07:54

## 2017-02-23 RX ADMIN — SODIUM CHLORIDE, POTASSIUM CHLORIDE, SODIUM LACTATE AND CALCIUM CHLORIDE: 600; 310; 30; 20 INJECTION, SOLUTION INTRAVENOUS at 07:08

## 2017-02-23 RX ADMIN — SODIUM CHLORIDE, POTASSIUM CHLORIDE, SODIUM LACTATE AND CALCIUM CHLORIDE: 600; 310; 30; 20 INJECTION, SOLUTION INTRAVENOUS at 13:55

## 2017-02-23 RX ADMIN — SODIUM CHLORIDE, POTASSIUM CHLORIDE, SODIUM LACTATE AND CALCIUM CHLORIDE 1000 ML: 600; 310; 30; 20 INJECTION, SOLUTION INTRAVENOUS at 14:05

## 2017-02-23 NOTE — PROGRESS NOTES
S:Pt presented [facility] the [facility] this morning for induction of labor.    B:  GBS-   A: Pt presents for pitocin induction of labor. Cervical exam by  yesterday in the clinic was 1cm.  Pt oriented to room. IV started.  Pitocin started.  has been up to evaluated the patient.   R: Will monitor closely.

## 2017-02-23 NOTE — H&P
"  2017    Paulette Singer  [id number]            OB Admit History & Physical      Ms. Singer is a  here at 39w4d for delivery under induction with planned .    Patient's last menstrual period was 2016 (approximate).   Her Estimated Date of Delivery: 2017, making her 39w4d  wks.      Estimated body mass index is 32.73 kg/(m^2) as calculated from the following:    Height as of 17: 5' 7\" (1.702 m).    Weight as of 17: 209 lb (94.8 kg).  Her prenatal course has been uncomplicated.    See prenatal for labs.  Negative GBBS, Rubella immune, RH positive    Estimated fetal weight = 7# 8oz       She is a 31 year old   Her OB history:   Obstetric History       T1      TAB0   SAB0   E0   M0   L2       # Outcome Date GA Lbr Garfield/2nd Weight Sex Delivery Anes PTL Lv   3 Current            2  10/20/14 35w0d  6 lb 1 oz (2.75 kg) M   Y Y      Name: Meño      Complications: Placental abruption   1 Term 10/27/12 41w0d  7 lb 14 oz (3.572 kg) M IVD  N Y      Name: Jamey      Obstetric Comments   EDC 2/15/2017 based on LMP.   to Timur.  This is their first delivery at [facility].            Past Medical History   Diagnosis Date     Headache 2007     Low ferritin 2016     3rd pregnancy     Migraines age 17     migraine/sinus headaches, better with excedrin     Plantar fasciitis 2009     See podiatry consult          Past Surgical History   Procedure Laterality Date     Hc ugi endoscopy, simple exam  2007     Pelvis laparoscopy,dx  2009     normal     Insert intrauterine device  2009     Mirena IUD under anesthetic     Hernia repair, inguinal rt/lt       bilateral as a baby     Mouth surgery       Kinta teeth         No current outpatient prescriptions on file.       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 No      Service No     Blood Transfusions No     Caffeine Concern No     Occupational Exposure Yes     Holistic healing     Hobby Hazards No     Sleep Concern No     Stress Concern No     Weight Concern No     Special Diet No     Back Care No     Exercise Yes     Seat Belt Yes     Social History Narrative    Lives in [place] with , Timur and their two sons.  No concerns about domestic violence.  No smokers in the home.  No indoor cats/kittens.      Family History   Problem Relation Age of Onset     Depression Mother      Thyroid Disease Mother      Gynecology Sister      endometriosis     CANCER Paternal Grandmother      eye         Vitals:   FHT 130s  With contractions every 3-6 min    Alert Awake in NAD  HEENT grossly normal  Neck: no lymphadenopathy or thryoidomegaly  Lungs CTAB  Heart RRR with S1 and S2 present. No murmurs, rubs, or gallops.  ABD gravid, firm, non-tender uterus on exam with fundus at approximately 38 cm  EXT:  1+ edema bilaterally  Neuro:  A&Ox3    Assessment: IUP at 39w4d expecting     Plan:  Expectant management      Scribed by Rusty Vidal MS3.    This patient was seen and examined by myself as well as the medical student.  The medical student  scribed the note and I have reviewed it, edited it appropriately, and agree with the final documentation.     Electronically signed by:  Rah Hill M.D.  2/23/2017    Dept of OB/GYN  February 23, 2017

## 2017-02-23 NOTE — PROGRESS NOTES
SUBJECTIVE:   Patient is claudia q 2-3 minutes, lasting 60 seconds but will then get long pauses in her contractions that can go out as long as 10-20 minutes.  She was pretty comfortable and has an had nothing for pain management.  The FHR is reassuring with multiple accelerations and no decelerations.  The overall strip is reactive.      Objective  /64  Pulse 85  Temp 98.3  F (36.8  C) (Oral)  Resp 16  LMP 05/12/2016 (Approximate)  SpO2 100%  Exam:  SVE: shows the cervix to be 3.5-4 cm/75%/-2 station AROM was accomplished using a FSE with copious clear fluid noted.    Ext: no edema    ASSESSMENT:  Term IUP at 39 4/7 wks gestation  Latent labor being induced with pitocin    PLAN:  Continue induction with pitocin.  Anticipate that with AROM her labor pattern should improve and contraction intensify.  She is interested in a labor epidural when the contractions get stronger.  She tends to go quickly once she gets to about 6 cm.  Anticipate a vaginal delivery    Electronically signed by:  Rah Hill M.D.  2/23/2017

## 2017-02-23 NOTE — IP AVS SNAPSHOT
[facility]    [address]     [place] [address]    Phone:  [phone]                                       After Visit Summary   2/23/2017    Paulette Singer    MRN: [id number]           After Visit Summary Signature Page     I have received my discharge instructions, and my questions have been answered. I have discussed any challenges I see with this plan with the nurse or doctor.    ..........................................................................................................................................  Patient/Patient Representative Signature      ..........................................................................................................................................  Patient Representative Print Name and Relationship to Patient    ..................................................               ................................................  Date                                            Time    ..........................................................................................................................................  Reviewed by Signature/Title    ...................................................              ..............................................  Date                                                            Time

## 2017-02-23 NOTE — PROGRESS NOTES
SUBJECTIVE:   Patient is claudia q 2.5 minutes, lasting 60 seconds and are 35 mm of H2O per contraction.  She is very comfortable and has an had an epidural for pain management.  The FHR is 120-130 with many accelerations and no decelerations.  The overall strip is very reassuring.      Objective  /68  Pulse 79  Temp 98  F (36.7  C) (Oral)  Resp 16  LMP 05/12/2016 (Approximate)  SpO2 98%  Exam:  SVE: shows the cervix to be 4 cm/80%/-2 station    ASSESSMENT:  Term IUP at 39 4/7 wks gestation  Active labor    PLAN:  Will continue to increase her pitocin, it was at 6 mlUnits/min continuous drip and is now up to 10 mlUnits/min continuous drip.    Electronically signed by:  Rah Hill M.D.  2/23/2017

## 2017-02-23 NOTE — PROGRESS NOTES
S: Induction of labor  B: Patient is a  who presents for scheduled induction of labor @ 39w4d gestation.  A: IV Oxytocin per OB protocol beginning at 1 mU/min. Patient has contractions every 6-8 min. Lasting 60-80 sec. Pt rates her pain at a 0/10. Understands that she will be on continuous EFM throughout induction. FHR baseline is 130 with moderate variability. Patient agrees with plan of care.   R: Will observe for active phase of labor and fetal tolerance.

## 2017-02-23 NOTE — ANESTHESIA PROCEDURE NOTES
Peripheral nerve/Neuraxial procedure note : epidural catheter  Pre-Procedure  Performed by  ELY LOPEZ   Location: OB      Pre-Anesthestic Checklist: patient identified, IV checked, risks and benefits discussed, informed consent, monitors and equipment checked, pre-op evaluation and at physician/surgeon's request    Timeout  Correct Patient: Yes   Correct Procedure: Yes   Correct Site: Yes   Correct Laterality: N/A   Correct Position: Yes   Site Marked: N/A   .   Procedure Documentation    .    Procedure:    Epidural catheter.  Insertion Site:L2-3  (midline approach) Injection technique: LORT air   Local skin infiltrated with mL of 1% lidocaine.       Patient Prep;mask, sterile gloves, povidone-iodine 7.5% surgical scrub, patient draped.  .  Needle: Skye Seals (18 G. 3.5 in). # of attempts: 1. # of redirects:.  Spinal Needle: . . . Catheter: 20 G . .  Catheter threaded easily  3 cm epidural space.  .   .    Assessment/Narrative  Paresthesias: No.  .  .  Aspiration negative for heme or CSF  . Test dose of 3 mL lidocaine 1.5% w/ 1:200,000 epinephrine at. Test dose negative for signs of intravascular, subdural or intrathecal injection. Sensory Level Left: T6  Sensory Level Right: T6  Comments:  In sitting position epidural cath placed as per note above without difficulty for 1 attempt.  Negative for heme, csf, or paresthesia procedure well tolerated.  Patient resting without complaint at this time 0/10 pain.  Will follow as necessary.

## 2017-02-23 NOTE — PROGRESS NOTES
Pt has epidural.  Resting comfortably.  Pitocin on (see flowsheet).  Fetal heart tones 120. Moderate variability.  Accels present. No decels.

## 2017-02-23 NOTE — IP AVS SNAPSHOT
MRN:[id number]                      After Visit Summary   2/23/2017    Paulette Singer    MRN: [id number]           Thank you!     Thank you for choosing [facility] for your care. Our goal is always to provide you with excellent care. Hearing back from our patients is one way we can continue to improve our services. Please take a few minutes to complete the written survey that you may receive in the mail after you visit with us. Thank you!        Patient Information     Date Of Birth          1985        About your hospital stay     You were admitted on:  February 23, 2017 You last received care in the:  [facility]    You were discharged on:  February 25, 2017       Who to Call     For medical emergencies, please call 911.  For non-urgent questions about your medical care, please call your primary care provider or clinic, [phone]          Attending Provider     Provider Specialty    Rah Hill MD Family Practice       Primary Care Provider Office Phone # Fax #    Sona Borja PA-C [phone]       [address]  [place] [address]        Your next 10 appointments already scheduled     Mar 01, 2017  9:15 AM CST   US FETAL BIOPHYS PROFILE W/O NON STRESS TEST with PHUS1   [facility] Ultrasound ([facility])    [address] [place] [id number] [phone]           Please bring a list of your medicines (including vitamins, minerals and over-the-counter drugs). Also, tell your doctor about any allergies you may have. Wear comfortable clothes and leave your valuables at home.  If you re less than 20 weeks drink four 8-ounce glasses of fluid an hour before your exam. If you need to empty your bladder before your exam, try to release only a little urine. Then, drink another glass of fluid.  You may have up to two family members in the exam room. If you bring a small child, an adult  must be there to care for him or her.  Please call the Imaging Department at your exam site with any questions.              Further instructions from your care team       [facility] Discharge Instructions     Discharge disposition:  Discharged to home       Diet:  Regular       Activity No lifting more than 20# x 2 wks then increase gradually by 10# per week.  No driving or operating machinery while on narcotic analgesics  Pelvic rest: abstain from intercourse and do not use tampons for 6 week(s)        Follow-up: Follow up with primary care provider in 6 weeks       Additional instructions: [place]-care per nursing        Postpartum Vaginal Delivery Instructions    Activity       Ask family and friends for help when you need it.    Do not place anything in your vagina for 6 weeks.    You are not restricted on other activities, but take it easy for a few weeks to allow your body to recover from delivery.  You are able to do any activities you feel up to that point.    No driving until you have stopped taking your pain medications (usually two weeks after delivery).     Call your health care provider if you have any of these symptoms:       Increased pain, swelling, redness, or fluid around your stiches from an episiotomy or perineal tear.    A fever above 100.4 F (38 C) with or without chills when placing a thermometer under your tongue.    You soak a sanitary pad with blood within 1 hour, or you see blood clots larger than a golf ball.    Bleeding that lasts more than 6 weeks.    Vaginal discharge that smells bad.    Severe pain, cramping or tenderness in your lower belly area.    A need to urinate more frequently (use the toilet more often), more urgently (use the toilet very quickly), or it burns when you urinate.    Nausea and vomiting.    Redness, swelling or pain around a vein in your leg.    Problems breastfeeding or a red or painful area on your breast.    Chest pain and cough or are  gasping for air.    Problems coping with sadness, anxiety, or depression.  If you have any concerns about hurting yourself or the baby, call your provider immediately.     You have questions or concerns after you return home.     Keep your hands clean:  Always wash your hands before touching your perineal area and stitches.  This helps reduce your risk of infection.  If your hands aren't dirty, you may use an alcohol hand-rub to clean your hands. Keep your nails clean and short.        Pending Results     No orders found from 2017 to 2017.            Statement of Approval     Ordered          17 1115  I have reviewed and agree with all the recommendations and orders detailed in this document.  EFFECTIVE NOW     Approved and electronically signed by:  Rah Hill MD             Admission Information     Date & Time Provider Department Dept. Phone    2017 Rah Hill MD [facility] [phone]      Your Vitals Were     Blood Pressure Pulse Temperature Respirations Last Period Pulse Oximetry    137/78 88 97.9  F (36.6  C) (Oral) 16 2016 (Approximate) 98%      MyChart Information     [employer] gives you secure access to your electronic health record. If you see a primary care provider, you can also send messages to your care team and make appointments. If you have questions, please call your primary care clinic.  If you do not have a primary care provider, please call [phone] and they will assist you.        Care EveryWhere ID     This is your Care EveryWhere ID. This could be used by other organizations to access your [facility] medical records  [id number]           Review of your medicines      START taking        Dose / Directions    acetaminophen 325 MG tablet   Commonly known as:  TYLENOL   Used for:   (spontaneous vaginal delivery)        Dose:  650 mg   Take 2 tablets (650 mg) by mouth every 4 hours as needed for mild pain or fever (greater than  or equal to 38? C /100.4? F (oral) or 38.5? C/ 101.4? F (core).)   Quantity:  100 tablet   Refills:  0       ibuprofen 400 MG tablet   Commonly known as:  ADVIL/MOTRIN   Used for:   (spontaneous vaginal delivery)        Dose:  400-800 mg   Take 1-2 tablets (400-800 mg) by mouth every 6 hours as needed for other (cramping)   Quantity:  120 tablet   Refills:  1       lanolin ointment   Used for:  Breast feeding status of mother        Apply topically every hour as needed for dry skin (sore nipples)   Quantity:  30 g   Refills:  3       vitamin D 2000 UNITS tablet   Used for:  Breast feeding status of mother        Dose:  2000 Units   Take 2,000 Units by mouth daily   Quantity:  100 tablet   Refills:  3         CONTINUE these medicines which have NOT CHANGED        Dose / Directions    prenatal multivitamin  plus iron 27-0.8 MG Tabs per tablet   Used for:  Amenorrhea        Dose:  1 tablet   Take 1 tablet by mouth daily   Quantity:  100 tablet   Refills:  3            Where to get your medicines      These medications were sent to [facility] [address]   [address] [facility] [address]     Phone:  [phone]     ibuprofen 400 MG tablet    lanolin ointment    vitamin D 2000 UNITS tablet         Some of these will need a paper prescription and others can be bought over the counter. Ask your nurse if you have questions.     You don't need a prescription for these medications     acetaminophen 325 MG tablet                Protect others around you: Learn how to safely use, store and throw away your medicines at www.disposemymeds.org.             Medication List: This is a list of all your medications and when to take them. Check marks below indicate your daily home schedule. Keep this list as a reference.      Medications           Morning Afternoon Evening Bedtime As Needed    acetaminophen 325 MG tablet   Commonly known as:  TYLENOL   Take 2 tablets (650 mg) by mouth  every 4 hours as needed for mild pain or fever (greater than or equal to 38? C /100.4? F (oral) or 38.5? C/ 101.4? F (core).)   Last time this was given:  650 mg on 2/24/2017 11:24 PM                                ibuprofen 400 MG tablet   Commonly known as:  ADVIL/MOTRIN   Take 1-2 tablets (400-800 mg) by mouth every 6 hours as needed for other (cramping)   Last time this was given:  800 mg on 2/25/2017 10:53 AM                                lanolin ointment   Apply topically every hour as needed for dry skin (sore nipples)                                prenatal multivitamin  plus iron 27-0.8 MG Tabs per tablet   Take 1 tablet by mouth daily                                vitamin D 2000 UNITS tablet   Take 2,000 Units by mouth daily

## 2017-02-24 LAB — HGB BLD-MCNC: 11.5 G/DL (ref 11.7–15.7)

## 2017-02-24 PROCEDURE — 25000132 ZZH RX MED GY IP 250 OP 250 PS 637: Performed by: FAMILY MEDICINE

## 2017-02-24 PROCEDURE — 85018 HEMOGLOBIN: CPT | Performed by: FAMILY MEDICINE

## 2017-02-24 PROCEDURE — 36415 COLL VENOUS BLD VENIPUNCTURE: CPT | Performed by: FAMILY MEDICINE

## 2017-02-24 PROCEDURE — 12000027 ZZH R&B OB

## 2017-02-24 RX ORDER — LANOLIN 100 %
OINTMENT (GRAM) TOPICAL
Qty: 30 G | Refills: 3 | Status: SHIPPED | OUTPATIENT
Start: 2017-02-24 | End: 2017-04-13

## 2017-02-24 RX ORDER — ACETAMINOPHEN 325 MG/1
650 TABLET ORAL EVERY 4 HOURS PRN
Qty: 100 TABLET
Start: 2017-02-24 | End: 2017-02-26

## 2017-02-24 RX ORDER — CHOLECALCIFEROL (VITAMIN D3) 50 MCG
2000 TABLET ORAL DAILY
Qty: 100 TABLET | Refills: 3 | Status: SHIPPED | OUTPATIENT
Start: 2017-02-24 | End: 2017-04-13

## 2017-02-24 RX ORDER — IBUPROFEN 400 MG/1
400-800 TABLET, FILM COATED ORAL EVERY 6 HOURS PRN
Qty: 120 TABLET | Refills: 1 | Status: SHIPPED | OUTPATIENT
Start: 2017-02-24 | End: 2018-07-05

## 2017-02-24 RX ADMIN — SENNOSIDES AND DOCUSATE SODIUM 1 TABLET: 8.6; 5 TABLET ORAL at 12:17

## 2017-02-24 RX ADMIN — IBUPROFEN 800 MG: 400 TABLET ORAL at 06:27

## 2017-02-24 RX ADMIN — ACETAMINOPHEN 650 MG: 325 TABLET ORAL at 23:24

## 2017-02-24 RX ADMIN — IBUPROFEN 800 MG: 400 TABLET ORAL at 18:39

## 2017-02-24 RX ADMIN — IBUPROFEN 800 MG: 400 TABLET ORAL at 12:17

## 2017-02-24 RX ADMIN — SENNOSIDES AND DOCUSATE SODIUM 2 TABLET: 8.6; 5 TABLET ORAL at 21:07

## 2017-02-24 NOTE — PROGRESS NOTES
S: Delivery  B: induced Labor,  @ 90van1ccis gestation, GBS negative.  A: Patient delivered Vaginal at 1850 with Dr. Hill in attendance. Baby delivered and placed on mother's low abdomen for delayed cord clamping where baby was dried and stimulated. After cord clamped and cut, baby was placed skin to skin on mother's chest within 5 minutes following delivery . Apgars 8/9. Placenta was delivered @ 1856 followed by administration of oxytocin. Bonding initiated with mom and baby. Educated mother on importance of exclusive breast feeding, expected feeding readiness cues and encouraged her to observe for feeding cues. Mother informed that breast feeding assistance would be provided. See flowsheet for VS and PP checks. Labor care plan goals met.  R: Expect routine postpartum care. Anticipate first feeding within the hour.

## 2017-02-24 NOTE — PROGRESS NOTES
Pt doing well, caring for self.  Resting throughout the day.  Pt verbalizing possibly wanting to be d/c to home this evening.  Dr. Hill would like a call later this evening with the pt's decision, this will be passed onto the next on coming staff.

## 2017-02-24 NOTE — L&D DELIVERY NOTE
Paulette is a 31-year-old -1-0-2 now P2-1-0-3 who is 39 4/7wks gestation who had prenatal care with Sona MICHAEL  at the [facility].  The patient's pregnancy was uncomplicated other than having had a history of oligohydramnios with previous pregnancies so had frequent BPPs for monitoring her levels that had been normal, her blood type is A+, Hep B neg, Rubella immune, TreponemaAb neg, HIVneg.     The patient was brought in for Pitocin induction today.     FIRST STAGE: She came in she was claudia irregularly. Pitocin was started at 0800. ROM occurred at 1321. She had copious amounts of clear fluid. She was 4cm/80% effaced/-2 station. Her Pitocin was at a max of 11 milliunits per minute. She was completely dilated and feeling pushy at 1848. She received an epidural for pain management This worked very nicely for her.   Total time of first stage of labor was 3 hours and 18 minutes.     SECOND STAGE: She did not labor down at all and then started pushing at 1848.  she delivered a viable male  over an intact perineum in the ELIA presentation at 1850.  Second stage was complicated by nothing     There was spontaneous crying and respirations noted. Baby was suctioned through the mouth and nares at completion of the delivery after it was placed on moms chest.  There was a nuchal cord that was reduced x 1. The cord was clamped x2 and cut by the nursing student.  Dad refused.   Total time of second stage of labor was 2 minutes.     THIRD STAGE: Placenta delivered with active management in the Schultze/Thao presentation. There was a 3-vessel cord noted and the placenta was intact and complete on inspection.   Total time of third stage of labor was 6 minutes.     On inspection of the perineum, there were no lacerations noted. Fundus was firm after the delivery of the placenta.  She received Pitocin through her IV bag after delivery of the placenta. Both mom and this male  who weighed 3650  grams or 8 pounds 1 ounce were stable when I left the delivery room. Apgars were 8 and 9 at 1 and 5 minutes respectively.       Electronically signed by:  Rah Hill M.D.  2017         Delivery Summary    Paulette Singer MRN# [id number]   Age: 31 year old YOB: 1985     ASSESSMENT & PLAN: Spontaneous vaginal delivery        Labor Event Times    Labor onset date:  17 Onset time:   3:30 PM   Dilation complete date:  17 Complete time:   6:48 PM   Start pushing date/time:  2017 1848            Labor Length    1st Stage (hrs):  3 (min):  18   2nd Stage (hrs):  0 (min):  2   3rd Stage (hrs):  0 (min):  6      Labor Events     labor?:  No    steroids:  None   Labor Type:  Induction, AROM   Predominate monitoring during 1st stage:  continuous electronic fetal monitoring      Antibiotics received during labor?:  No      Rupture identifier:  Rupture 1   Rupture date/time: 17 1330   Rupture type:  Artificial Rupture of Membranes   Fluid color:  Clear   Fluid odor:  Normal      Induction:  Oxytocin   Induction date/time:     Cervical ripening date/time:     Indications for induction:  Elective      1:1 continuous labor support provided by?:  RN Labor partogram used?:  no         Delivery/Placenta Date and Time    Delivery Date:  17 Delivery Time:   6:50 PM   Placenta Date/Time:  2017  6:56 PM   Oxytocin given at the time of delivery:  after delivery of placenta      Vaginal Counts    Initial count performed by 2 team members:   Two Team Members   Sue Hill          Needles Suture [place] Sponges Instruments   Initial counts 2 5     Added to count 0      Final counts 2 5        Placed during labor Accounted for at the end of labor   No NA   Yes Yes   No NA      Final count performed by 2 team members:   Two Team Members   Sue Hill         Final count correct?:  Yes         Apgars    Living status:  Yes    1  "Minute 5 Minute 10 Minute 15 Minute 20 Minute   Skin color: 0  1       Heart rate: 2  2       Reflex irritability: 2  2       Muscle tone: 2  2       Respiratory effort: 2  2       Total: 8  9          Apgars assigned by:  KORY ROSS RN      Cord    Vessels:  3 Vessels Complications:  Nuchal   Cord Blood Disposition:  Lab Gases Sent?:  No          Resuscitation    Methods:  None         [place] Measurements    Weight:  128.8 oz Length:  20.75\"   Head circumference:  0.362 m Chest circumference:  0.337 m      Skin to Skin and Feeding Plan    Skin to skin initiation date/time: 17   Skin to skin with:  Mother   Skin to skin end date/time:     How do you plan to feed your baby:  Breastfeeding      Labor Events and Shoulder Dystocia    Fetal Tracing Prior to Delivery:  Category 1   Shoulder dystocia present?:  Neg            Delivery (Maternal) (Provider to Complete) (115050)    Episiotomy:  None   Perineal lacerations:  None    Vaginal laceration?:  No    Cervical laceration?:  No          Mother's Information  Mother: Paulette Singer #[id number]    Start of Mother's Information     IO Blood Loss  17 1530 - 17 1917    Mom's I/O Activity            End of Mother's Information  Mother: Paulette Singer #[id number]            Delivery - Provider to Complete ([id number])    Delivering clinician:  SHANNAN PADGETT   Attempted Delivery Types (Choose all that apply):  Spontaneous Vaginal Delivery   Delivery Type (Choose the 1 that will go to the Birth History):  Vaginal, Spontaneous Delivery                     Other personnel:   Provider Role   JESUS WOODALL Medical Student   ELPIDIO MARIANO Registered Nurse   MISHA ROSS Nurse            Doc    Delayed Cord Clamping:  Done   Date/Time:  2017  6:56 PM   Removal:  Expressed   Disposition:  Hospital disposal      Anesthesia    Method:  Epidural   Cervical dilation at placement:  4-7         Presentation and Position  "   Presentation:  Vertex   Position:  Left Occiput Anterior                    Electronically signed by:  Rah Hill M.D.  2/23/2017

## 2017-02-24 NOTE — PROGRESS NOTES
Pt complete.  Has no pain.  Dr. Hill in the department, called to patient's bedside.  Pt is feeling pressure.

## 2017-02-24 NOTE — PROGRESS NOTES
[facility] Obstetrics Post-Partum Progress Note          Assessment and Plan:    Assessment:   Post-partum day #1  Induced vaginal delivery secondary to maternal request  L&D complications: None      Doing well.  No excessive bleeding  Pain well-controlled.      Plan:   Ambulation encouraged  Breast feeding strategies discussed  Pain control measures as needed  Reportable signs and symptoms dicussed with the patient  Discharge either later today or tomorrow if she decides to wants to stay an extra day           Interval History:   Doing well.  Pain is adequately controlled.  No fevers.  No history of foul-smelling vaginal discharge.  Good appetite.  Denies chest pain, shortness of breath, nausea or vomiting.  Vaginal bleeding is similar to a heavy menstrual flow.  Breastfeeding well.          Significant Problems:    None          Review of Systems:    The patient denies any chest pain, shortness of breath, excessive pain, fever, chills, purulent drainage from the wound, nausea or vomiting.          Medications:   All medications related to the patient's surgery have been reviewed          Physical Exam:   All vitals stable  Temp: 97.4  F (36.3  C) Temp src: Oral BP: 127/59 Pulse: 96   Resp: 16 SpO2: 98 %      LUNGS:  Clear to auscultation bilaterally, no wheezing, rhonci or rales.   CV:  RRR nl S1/S2 no murmur.  Abdomen: fundal height at Umbilicus   Extremities: no edema            Data:     All laboratory data related to this surgery reviewed  Hemoglobin   Date Value Ref Range Status   02/24/2017 11.5 (L) 11.7 - 15.7 g/dL Final   02/08/2017 12.1 11.7 - 15.7 g/dL Final   12/29/2016 12.7 11.7 - 15.7 g/dL Final   12/01/2016 13.0 11.7 - 15.7 g/dL Final   11/07/2016 13.2 11.7 - 15.7 g/dL Final     No imaging studies have been ordered    Electronically signed by:  Rah Hill M.D.  2/24/2017

## 2017-02-24 NOTE — PROGRESS NOTES
Pt resting well.  Pain negligible.  Pt has not desired any pain medication.  Breastfeeding independently after some initial assistance.

## 2017-02-24 NOTE — DISCHARGE INSTRUCTIONS
[facility] Discharge Instructions     Discharge disposition:  Discharged to home       Diet:  Regular       Activity No lifting more than 20# x 2 wks then increase gradually by 10# per week.  No driving or operating machinery while on narcotic analgesics  Pelvic rest: abstain from intercourse and do not use tampons for 6 week(s)        Follow-up: Follow up with primary care provider in 6 weeks       Additional instructions: [place]onda-care per nursing        Postpartum Vaginal Delivery Instructions    Activity       Ask family and friends for help when you need it.    Do not place anything in your vagina for 6 weeks.    You are not restricted on other activities, but take it easy for a few weeks to allow your body to recover from delivery.  You are able to do any activities you feel up to that point.    No driving until you have stopped taking your pain medications (usually two weeks after delivery).     Call your health care provider if you have any of these symptoms:       Increased pain, swelling, redness, or fluid around your stiches from an episiotomy or perineal tear.    A fever above 100.4 F (38 C) with or without chills when placing a thermometer under your tongue.    You soak a sanitary pad with blood within 1 hour, or you see blood clots larger than a golf ball.    Bleeding that lasts more than 6 weeks.    Vaginal discharge that smells bad.    Severe pain, cramping or tenderness in your lower belly area.    A need to urinate more frequently (use the toilet more often), more urgently (use the toilet very quickly), or it burns when you urinate.    Nausea and vomiting.    Redness, swelling or pain around a vein in your leg.    Problems breastfeeding or a red or painful area on your breast.    Chest pain and cough or are gasping for air.    Problems coping with sadness, anxiety, or depression.  If you have any concerns about hurting yourself or the baby, call your provider immediately.     You  have questions or concerns after you return home.     Keep your hands clean:  Always wash your hands before touching your perineal area and stitches.  This helps reduce your risk of infection.  If your hands aren't dirty, you may use an alcohol hand-rub to clean your hands. Keep your nails clean and short.

## 2017-02-24 NOTE — PROGRESS NOTES
S: Doing well. Patient feels hungry after not eating for 12 hours.    O: 6 cm, 90% effaced, -2 station. FHTs 120 with accelerations and moderate variability. Contractions regular with one every 2.5 minutes. On 12 mL/hr of Pitocin with 350 [place] units. Shaffer catheter inserted.    A: Active labor.    P: Expectant management.    Scribed by Rusty Vidal MS3.    This patient was seen and examined by myself as well as the medical student.  The medical student scribed the note and I have reviewed it, edited it appropriately, and agree with the final documentation.     Electronically signed by:  Rah Hill M.D.  2/24/2017

## 2017-02-25 VITALS
SYSTOLIC BLOOD PRESSURE: 137 MMHG | DIASTOLIC BLOOD PRESSURE: 78 MMHG | TEMPERATURE: 97.9 F | HEART RATE: 88 BPM | OXYGEN SATURATION: 98 % | RESPIRATION RATE: 16 BRPM

## 2017-02-25 PROCEDURE — 25000132 ZZH RX MED GY IP 250 OP 250 PS 637: Performed by: FAMILY MEDICINE

## 2017-02-25 RX ADMIN — IBUPROFEN 800 MG: 400 TABLET ORAL at 10:53

## 2017-02-25 RX ADMIN — IBUPROFEN 800 MG: 400 TABLET ORAL at 02:05

## 2017-02-25 NOTE — DISCHARGE SUMMARY
[facility] Discharge Summary    Paulette Singer MRN# [id number]   Age: 31 year old YOB: 1985     Date of Admission:  2/23/2017  Date of Discharge::  2/25/2017  Admitting Physician:  Rah Hill MD  Discharge Physician:  Rusty Vidal     Home clinic: [facility]          Admission Diagnoses:   Normal induced labor  Pregnancy          Discharge Diagnosis:   Normal spontaneous vaginal delivery  Intrauterine pregnancy at 39 4/7 weeks gestation          Procedures:   Procedure(s): No additional procedures performed            Medications Prior to Admission:     Prescriptions Prior to Admission   Medication Sig Dispense Refill Last Dose     Prenatal Vit-Fe Fumarate-FA (PRENATAL MULTIVITAMIN  PLUS IRON) 27-0.8 MG TABS Take 1 tablet by mouth daily 100 tablet 3 2/22/2017 at Unknown time             Discharge Medications:     Current Facility-Administered Medications   Medication     oxytocin (PITOCIN) 30 units in 500 mL 0.9% NaCl infusion     ibuprofen (ADVIL/MOTRIN) tablet 400-800 mg     acetaminophen (TYLENOL) tablet 650 mg     naloxone (NARCAN) injection 0.1-0.4 mg     senna-docusate (SENOKOT-S;PERICOLACE) 8.6-50 MG per tablet 1-2 tablet     bisacodyl (DULCOLAX) Suppository 10 mg     sodium phosphate (FLEET ENEMA) 1 enema     hydrocortisone 2.5 % cream     lanolin ointment     NO Rho (D) immune globulin (RhoGam) needed - mother Rh POSITIVE     No MMR Needed - Assessment: Patient does not need MMR vaccine     No Tdap Needed - Assessment: Patient does not need Tdap vaccine     acetaminophen-codeine (TYLENOL #3) 300-30 MG per tablet 1-2 tablet             Consultations:   No consultations were requested during this admission          Brief History of Labor:   Ms. Singer was admitted to the OB floor for Pitocin induction on 2/23/17. She contracted irregularly with Pitocin started at 0800. ROM occurred at 1321 with copious amounts of clear fluid. She was 4 cm/80% effaced/-2 station  with a maximum Pitocin of 11 milliunits per minute. She was completely dilated and feeling pushy at 1848. She received an epidural for pain management, which worked nicely for her. The first stage of labor lasted 3 hours 18 minutes.    She started pushing at 1848 and delivered a viable male  over an intact perineum in the ELIA position at 1850. There were no complications with the second stage of labor, which lasted two minutes. There was spontaneous crying and respirations noted. Baby was suctioned through the mouth and nares at completion of delivery and placed on mom's chest. There was a nuchal cord reduced x1. The cord was clamped x2 and cut by the nursing student.    The placenta was delivered with active management in the Schultze/Thao presentation. There was a three-vessel cord noted and the placenta was intact and complete on inspection. The third stage of labor lasted six minutes.    On inspection of the perineum, no lacerations were noted. The fundus was firm after delivery of the placenta. She received Pitocin through her IV after placenta delivery. The male  weighed 3650 grams or 8 pounds 1 ounce and he and mom were stable. Apgars were 8 and 9 at 1 and 5 minutes, respectively.           Hospital Course:   The patient's hospital course was unremarkable.  On discharge, her pain was well controlled. She mostly felt pain in her back at the epidural site and was using ibuprofen for pain. Vaginal bleeding is similar to menstrual flow.  Voiding without difficulty.  Ambulating well and tolerating a normal diet.  No fever.  Breastfeeding well.  Infant is stable. She was discharged on post-partum day #2.    Post-partum hemoglobin:   Hemoglobin   Date Value Ref Range Status   2017 11.5 (L) 11.7 - 15.7 g/dL Final             Discharge Instructions and Follow-Up:   Discharge diet: Regular   Discharge activity: No lifting more than 20# x 2 wks then increase gradually by 10# per week.  No driving  or operating machinery while on narcotic analgesics  Pelvic rest: abstain from intercourse and do not use tampons for 6 week   Discharge follow-up: Follow up with primary care provider in six weeks   Wound care: Drink plenty of fluids  Ice to area for comfort  Keep wound clean and dry           Discharge Disposition:   Discharged to home      Attestation:  I have reviewed today's vital signs, notes, medications, labs and imaging.  Amount of time performed on this discharge summary: 15 minutes.    Scribed by Rusty Vidal MS3.    This patient was seen and examined by myself as well as the medical student.  The medical student scribed the note and I have reviewed it, edited it appropriately, and agree with the final documentation.     Electronically signed by:  Rah Hill M.D.  2/25/2017

## 2017-02-25 NOTE — ANESTHESIA CARE TRANSFER NOTE
Patient: Paulette Singer    * No procedures listed *    Diagnosis: active labor  Diagnosis Additional Information: No value filed.    Anesthesia Type:   No value filed.     Note:  Airway :Room Air  Patient transferred to:Labor and Delivery        Vitals: (Last set prior to Anesthesia Care Transfer)              Electronically Signed By: JAMEY Bullock CRNA  February 24, 2017  8:05 PM

## 2017-02-25 NOTE — ANESTHESIA POSTPROCEDURE EVALUATION
"Patient: Paulette Singer    * No procedures listed *    Diagnosis:active labor  Diagnosis Additional Information: No value filed.    Anesthesia Type:  No value filed.    Note:  Anesthesia Post Evaluation    Patient location during evaluation: Floor  Patient participation: Able to fully participate in evaluation  Level of consciousness: awake and alert  Pain management: adequate  Airway patency: patent  Cardiovascular status: blood pressure returned to baseline  Respiratory status: spontaneous ventilation and room air  Hydration status: euvolemic  PONV: none     Anesthetic complications: None    Comments: Patient sitting on bed nursing her baby. Stated that her epidural worked \"really well\"  She has no complaints or concerns at this time.  She stated her pain was very well controlled        Last vitals:  Vitals:    02/23/17 2330 02/24/17 0741 02/24/17 1559   BP: 135/69 127/59 123/59   Pulse: 101 96 79   Resp: 16 16 18   Temp: 98.2  F (36.8  C) 97.4  F (36.3  C) 97.4  F (36.3  C)   SpO2:            Electronically Signed By: JAMEY Bullock CRNA  February 24, 2017  8:05 PM  "

## 2017-02-25 NOTE — PLAN OF CARE
Problem: Postpartum, Vaginal Delivery (Adult)  Goal: Signs and Symptoms of Listed Potential Problems Will be Absent or Manageable (Postpartum, Vaginal Delivery)  Signs and symptoms of listed potential problems will be absent or manageable by discharge/transition of care (reference Postpartum, Vaginal Delivery (Adult) CPG).   Outcome: Improving  S: Shift review   B:Paulette is a  who delivered vaginally on yesterday  A: VSS, patient is independent with mobility, pain well controlled with p.o. pain meds. Handles baby with confidence.  R: Continue with routine PP care

## 2017-02-25 NOTE — PLAN OF CARE
Problem: Goal Outcome Summary  Goal: Goal Outcome Summary  Outcome: Improving  S: Shift review   B:Paulette is a  who delivered vaginally on   A: VSS, patient is independent with mobility, pain well controlled occasional ibuprofen Handles baby with confidence..   R: Continue with routine PP care

## 2017-02-25 NOTE — PLAN OF CARE
Problem: Postpartum, Vaginal Delivery (Adult)  Goal: Signs and Symptoms of Listed Potential Problems Will be Absent or Manageable (Postpartum, Vaginal Delivery)  Signs and symptoms of listed potential problems will be absent or manageable by discharge/transition of care (reference Postpartum, Vaginal Delivery (Adult) CPG).   S-(situation): Discharge  to home     B-(background): Patient had a Vaginal delivery with no complications. Baby boy Baby's name Major, breast: . Support person's name Timur.      A-(assessment): stable and independent with all self and  cares. Ibuprofen effective for pain relief.      R-(recommendations):  Discharge instructions reviewed and questions answered.  Belongings gathered and returned to the patient. Agreed to follow up in 6 weeks or sooner with any question or concerns.      Nursing Discharge Checklist:     Pneumovax screened and given, if appropriate: N/A  Influenza vaccine screened and given, if appropriate: N/A  Staples removed (): N/A  Breast milk returned: N/A  Hydrogel pads sent home:N/A  Birth Certificate Done: YES     Discharged ambulatory with , , at 1215

## 2017-02-26 ENCOUNTER — TELEPHONE (OUTPATIENT)
Dept: NURSING | Facility: CLINIC | Age: 32
End: 2017-02-26

## 2017-02-26 ENCOUNTER — HOSPITAL ENCOUNTER (EMERGENCY)
Facility: CLINIC | Age: 32
Discharge: HOME | End: 2017-02-26
Attending: EMERGENCY MEDICINE | Admitting: EMERGENCY MEDICINE
Payer: COMMERCIAL

## 2017-02-26 VITALS
DIASTOLIC BLOOD PRESSURE: 74 MMHG | RESPIRATION RATE: 12 BRPM | TEMPERATURE: 97 F | OXYGEN SATURATION: 97 % | SYSTOLIC BLOOD PRESSURE: 117 MMHG | HEART RATE: 89 BPM

## 2017-02-26 DIAGNOSIS — N30.00 ACUTE CYSTITIS WITHOUT HEMATURIA: ICD-10-CM

## 2017-02-26 LAB
ALBUMIN UR-MCNC: 100 MG/DL
APPEARANCE UR: ABNORMAL
BACTERIA #/AREA URNS HPF: ABNORMAL /HPF
BILIRUB UR QL STRIP: NEGATIVE
COLOR UR AUTO: YELLOW
GLUCOSE UR STRIP-MCNC: NEGATIVE MG/DL
HGB UR QL STRIP: ABNORMAL
KETONES UR STRIP-MCNC: NEGATIVE MG/DL
LEUKOCYTE ESTERASE UR QL STRIP: ABNORMAL
NITRATE UR QL: NEGATIVE
PH UR STRIP: 6.5 PH (ref 5–7)
RBC #/AREA URNS AUTO: >100 /HPF (ref 0–2)
SP GR UR STRIP: 1.02 (ref 1–1.03)
URN SPEC COLLECT METH UR: ABNORMAL
UROBILINOGEN UR STRIP-ACNC: 0.2 EU/DL (ref 0.2–1)
WBC #/AREA URNS AUTO: ABNORMAL /HPF (ref 0–2)

## 2017-02-26 PROCEDURE — 81001 URINALYSIS AUTO W/SCOPE: CPT | Performed by: EMERGENCY MEDICINE

## 2017-02-26 PROCEDURE — 99283 EMERGENCY DEPT VISIT LOW MDM: CPT

## 2017-02-26 PROCEDURE — 99283 EMERGENCY DEPT VISIT LOW MDM: CPT | Performed by: EMERGENCY MEDICINE

## 2017-02-26 RX ORDER — CEPHALEXIN 500 MG/1
500 CAPSULE ORAL 3 TIMES DAILY
Qty: 9 CAPSULE | Refills: 0 | Status: SHIPPED | OUTPATIENT
Start: 2017-02-26 | End: 2017-03-01

## 2017-02-26 NOTE — ED AVS SNAPSHOT
[facility] Emergency Department    [address] [place] [address]    Phone:  [phone]    Fax:  [phone]                                       Paulette Singer   MRN: [id number]    Department:  [facility] Emergency Department   Date of Visit:  2/26/2017           After Visit Summary Signature Page     I have received my discharge instructions, and my questions have been answered. I have discussed any challenges I see with this plan with the nurse or doctor.    ..........................................................................................................................................  Patient/Patient Representative Signature      ..........................................................................................................................................  Patient Representative Print Name and Relationship to Patient    ..................................................               ................................................  Date                                            Time    ..........................................................................................................................................  Reviewed by Signature/Title    ...................................................              ..............................................  Date                                                            Time

## 2017-02-26 NOTE — ED AVS SNAPSHOT
[facility] Emergency Department    [address] [place] [address]    Phone:  [phone]    Fax:  [phone]                                       Paulette Singer   MRN: [id number]    Department:  [facility] Emergency Department   Date of Visit:  2/26/2017           Patient Information     Date Of Birth          1985        Your diagnoses for this visit were:     Acute cystitis without hematuria        You were seen by Khari Bernal MD.      Follow-up Information     Follow up with Sona Borja PA-C.    Specialty:  Family Practice    Why:  if not better in 2 days    Contact information:    [facility]  [address] [place] [address] [phone]          Follow up with [facility] Emergency Department.    Specialty:  EMERGENCY MEDICINE    Why:  If symptoms worsen    Contact information:    [address] [place] [address] [phone]    Additional information:    From [facility] [address]: Exit at [address] on [address] side of [place]. Turn right on [address]. Turn left at stoplight on [address]. [facility] will be in view two blocks ahead        Discharge Instructions             Discharge References/Attachments     URINARY TRACT INFECTIONS IN WOMEN (ENGLISH)      Future Appointments        Provider Department Dept Phone Center    3/1/2017  9:15 AM [facility] ULTRASOUND ROOM 1 [facility] Ultrasound [phone] [facility]      24 Hour Appointment Hotline       To make an appointment at any [facility], call [phone] ([phone]). If you don't have a family doctor or clinic, we will help you find one. [facility] are conveniently located to serve the needs of you and your family.             Review of your medicines      START taking        Dose / Directions Last dose taken    cephALEXin 500 MG capsule   Commonly known as:  KEFLEX   Dose:  500 mg   Quantity:  9 capsule         Take 1 capsule (500 mg) by mouth 3 times daily for 3 days Known amox allergy OK   Refills:  0          Our records show that you are taking the medicines listed below. If these are incorrect, please call your family doctor or clinic.        Dose / Directions Last dose taken    ibuprofen 400 MG tablet   Commonly known as:  ADVIL/MOTRIN   Dose:  400-800 mg   Quantity:  120 tablet        Take 1-2 tablets (400-800 mg) by mouth every 6 hours as needed for other (cramping)   Refills:  1        lanolin ointment   Quantity:  30 g        Apply topically every hour as needed for dry skin (sore nipples)   Refills:  3        prenatal multivitamin  plus iron 27-0.8 MG Tabs per tablet   Dose:  1 tablet   Quantity:  100 tablet        Take 1 tablet by mouth daily   Refills:  3        vitamin D 2000 UNITS tablet   Dose:  2000 Units   Quantity:  100 tablet        Take 2,000 Units by mouth daily   Refills:  3                Prescriptions were sent or printed at these locations (1 Prescription)                   [facility] [address] [place] [id number]    Telephone:  [phone]   Fax:  [phone]   Hours:  M-F 9-7 SAT 9-6 SUN 11-3                E-Prescribed (1 of 1)         cephALEXin (KEFLEX) 500 MG capsule                Procedures and tests performed during your visit     UA with Microscopic      Orders Needing Specimen Collection     None      Pending Results     No orders found from 2/24/2017 to 2/27/2017.            Pending Culture Results     No orders found from 2/24/2017 to 2/27/2017.            Thank you for choosing [facility]       Thank you for choosing [facility] for your care. Our goal is always to provide you with excellent care. Hearing back from our patients is one way we can continue to improve our services. Please take a few minutes to complete the written survey that you may receive in the mail after you visit with us. Thank you!        [facility]  Information     katena gives you secure access to your electronic health record. If you see a primary care provider, you can also send messages to your care team and make appointments. If you have questions, please call your primary care clinic.  If you do not have a primary care provider, please call [phone] and they will assist you.        Care EveryWhere ID     This is your Care EveryWhere ID. This could be used by other organizations to access your [facility] medical records  [id number]        After Visit Summary       This is your record. Keep this with you and show to your community pharmacist(s) and doctor(s) at your next visit.

## 2017-02-26 NOTE — TELEPHONE ENCOUNTER
"Call Type: Triage Call    Presenting Problem: Paulette delivered on Thursday, Feb 23rd and is  having spasms  in vagina area.  Paulette is having \" urgency and  frequency.\"  No fever.  Paulette feels she has a UTI.  [facility]  Triage/Urinary Symptoms/disposition is to be seen within 8 hours and  Paulette agreed.  Triage Note:  Guideline Title: Urinary Symptoms - Female  Recommended Disposition: See Provider within 8 Hours  Original Inclination: Wanted to speak with a nurse  Override Disposition:  Intended Action: Go to Urgent Care Center  Physician Contacted: No  Urinary tract symptoms AND any flank or low back pain ?  YES  Blood in urine ? NO  Abnormal vaginal discharge (such as increased quantity, abnormal color,  consistency, odor) ? NO  Flank pain ? NO  New or worsening signs and symptoms that may indicate shock ? NO  Any temperature elevation in a frail elderly, immunocompromised or pregnant person  ? NO  Unbearable abdominal/pelvic pain ? NO  Current or recent urinary tract instrumentation AND urinary tract symptoms OR no  urine flow ? NO  Urinary tract symptoms AND fever 101.5 F (38.6 C) or higher or vomiting ? NO  No urination for 12 or more hours ? NO  Any other unexpected urinary symptoms following urinary tract or abdominal surgery  within timeframe specified by provider ? NO  Physician Instructions:  Care Advice: Another adult should drive.  Call provider if symptoms worsen, such as increasing pain in low back,  pelvis, or side(s)  blood in urine  or fever.  CAUTIONS  SYMPTOM / CONDITION MANAGEMENT  List, or take, all current prescription(s), nonprescription or alternative  medication(s) to provider for evaluation.  Systemic Inflammatory Response Syndrome (SIRS):   Watch for signs of a  generalized, whole body infection. Occurs within days of a localized  infection, especially of the urinary, GI, respiratory or nervous systems  or after a traumatic injury or invasive procedure.   - Call  if  symptoms have " worsened, such as increasing confusion or unusual drowsiness  cold and clammy skin  no urine output  rapid respiration (>30/min.) or slow respiration (<10/min.)  struggling to breathe.   - Go to the ED immediately for early symptoms of  rapid pulse >90/min. or rapid breathing >20/min. at rest  chills  oral temperature >100.4 F (38 C) or <96.8 F (36 C) when associated with  conditions noted.  Limit carbonated, alcoholic, and caffeinated beverages such as coffee, tea  and soda.  Avoid nonprescription cold and allergy medications that contain  caffeine.  Limit intake of tomatoes, fruit juices (except for unsweetened  cranberry juice), dairy products, spicy foods, sugar, and artificial  sweeteners (aspartame or saccharine).  Stop or decrease smoking.  Reducing  exposure to bladder irritants may help lessen urgency.  Increase intake of fluids. Try to drink 8 oz. (.2 liter) every hour when  awake, unless on restricted fluids for other medical reasons. Include at  least two 8 oz. (.2 liter) glasses of unsweetened cranberry juice each day.  Take sips of fluid or eat ice chips if nauseated or vomiting.  Tell your provider if you are taking warfarin, Coumadin, Pradaxa, Xarelto,  or any other blood thinner and drinking cranberry juice or taking cranberry  capsules.  Analgesic/Antipyretic Advice - NSAIDs: Consider aspirin, ibuprofen,  naproxen or ketoprofen for pain or fever as directed on label or by  pharmacist/provider. PRECAUTIONS: - You should not take this medicine for  more than 10 days unless recommended by your provider. EXCEPTIONS: - Should  not be used if taking blood thinners or have bleeding problems. - Do not  use if have history of sensitivity/allergy to any of these medications  or history of cardiovascular, ulcer, kidney, liver disease or diabetes  unless approved by provider. - Do not exceed recommended dose or frequency.  Analgesic/Antipyretic Advice - Acetaminophen: Consider acetaminophen as  directed on  label or by pharmacist/provider for pain or fever. PRECAUTIONS:  - Use if there is no history of liver disease, alcoholism, or intake of  three or more alcohol drinks per day - Only if approved by provider during  pregnancy or when breastfeeding - Do not exceed recommended dose or  frequency. Do not take more than 3000 milligrams (mg) in 24 hours. Do not  take this medicine for more than 10 days unless recommended by your  provider. - During pregnancy, acetaminophen should not be taken more than 3  consecutive days without telling provider - To make sure you don't take too  much, check other medicines you take to see if they also contain  acetaminophen.

## 2017-02-26 NOTE — ED PROVIDER NOTES
History     Chief Complaint   Patient presents with     UTI     HPI  Paulette Singer is a 31 year old female who presents with symptoms consistent with urinary tract infection.  She states her symptoms started overnight, and the last 10 hours.  She has some bladder spasm.  She also has some dysuria.  No flank pain, no vomiting and no fever.  She is 3 days postpartum status post normal vaginal delivery that was uncomplicated she states.    I have reviewed the Medications, Allergies, Past Medical and Surgical History, and Social History in the Epic system.    Review of Systems  All other systems are reviewed and are negative    Physical Exam   BP: 129/62  Pulse: 83  Temp: 97  F (36.1  C)  Resp: 12  SpO2: 97 %  Physical Exam   Constitutional: She appears well-developed and well-nourished. No distress.   HENT:   Head: Normocephalic and atraumatic.   Mouth/Throat: Oropharynx is clear and moist.   Eyes: Pupils are equal, round, and reactive to light. No scleral icterus.   Neck: Normal range of motion. Neck supple.   Cardiovascular: Normal rate, regular rhythm, normal heart sounds and intact distal pulses.    No murmur heard.  Pulmonary/Chest: No stridor. No respiratory distress. She has no wheezes. She has no rales.   Abdominal: Soft. There is no tenderness.   Musculoskeletal: She exhibits no edema or tenderness.   Neurological: She is alert.   Skin: Skin is warm and dry. No rash noted. She is not diaphoretic. No erythema. No pallor.   Psychiatric: She has a normal mood and affect.   Nursing note and vitals reviewed.      ED Course     ED Course     Procedures             Critical Care time:  none               Labs Ordered and Resulted from Time of ED Arrival Up to the Time of Departure from the ED   UA WITH MICROSCOPIC - Abnormal; Notable for the following:        Result Value    Protein Albumin Urine 100 (*)     Blood Urine Large (*)     Leukocyte Esterase Urine Small (*)     WBC Urine 10-25 (*)     RBC Urine >100  (*)     Bacteria Urine Few (*)     All other components within normal limits       Assessments & Plan (with Medical Decision Making)     I have reviewed the nursing notes.    I have reviewed the findings, diagnosis, plan and need for follow up with the patient.    New Prescriptions    CEPHALEXIN (KEFLEX) 500 MG CAPSULE    Take 1 capsule (500 mg) by mouth 3 times daily for 3 days Known amox allergy OK       Final diagnoses:   Acute cystitis without hematuria       2/26/2017   [facility] EMERGENCY DEPARTMENT     Khari Bernal MD  02/26/17 0855

## 2017-03-02 NOTE — PROGRESS NOTES
"Paulette Singer  Gender: female  : 1985  [address] [facility] [address]  [phone] (home)   Medical Record: [id number]  Primary Care Provider: Sona Borja       [facility]   ?   Discharge Phone Call: Key Words/Key Times     How are you and the baby? \"we are doing great\" My milk is in well, pt denies having any concerns.\"    How are feedings going? Wonderful BF every 2-3 hours      Voiding & Stooling? \"lots of each\"    Any questions or concerns? \"none\"    Follow-up appointment? Last Tuesday, everything great\" Major is up 5 oz.      We want to provide excellent care here at [facility]. Do you have any feedback for us that would help us improve? \"Everything was great, best experience yet.\"    Call back COMMENTS:         Attempted Calls:   X 1_________     __________  "

## 2017-03-03 NOTE — ANESTHESIA PREPROCEDURE EVALUATION
Anesthesia Evaluation       history and physical reviewed . Pt has had prior anesthetic. Type: Regional      ROS/MED HX    ENT/Pulmonary:  - neg pulmonary ROS     Neurologic:  - neg neurologic ROS     Cardiovascular:  - neg cardiovascular ROS       METS/Exercise Tolerance:     Hematologic:         Musculoskeletal:         GI/Hepatic:  - neg GI/hepatic ROS       Renal/Genitourinary:         Endo:         Psychiatric:         Infectious Disease:         Malignancy:         Other:               Physical Exam  Normal systems: cardiovascular and dental    Airway   Mallampati: II  TM distance: < 3 FB  Neck ROM: full  Mouth opening: > 3 cm    Dental     Cardiovascular       Pulmonary     Other findings: Patient has a history of a one sided block with her last pregnancy.  She did not deliver at [facility] at that time.       neg OB ROS                 Anesthesia Plan      History & Physical Review  History and physical reviewed and following examination; no interval change.    ASA Status:  2 .    NPO Status:  > 8 hours    Plan for Epidural          Postoperative Care      Consents  Anesthetic plan, risks, benefits and alternatives discussed with:  Patient..                          .

## 2017-03-07 ENCOUNTER — MYC MEDICAL ADVICE (OUTPATIENT)
Dept: FAMILY MEDICINE | Facility: CLINIC | Age: 32
End: 2017-03-07

## 2017-03-07 ENCOUNTER — OFFICE VISIT (OUTPATIENT)
Dept: FAMILY MEDICINE | Facility: CLINIC | Age: 32
End: 2017-03-07
Payer: COMMERCIAL

## 2017-03-07 VITALS
HEART RATE: 78 BPM | TEMPERATURE: 99.3 F | BODY MASS INDEX: 30.07 KG/M2 | SYSTOLIC BLOOD PRESSURE: 140 MMHG | RESPIRATION RATE: 16 BRPM | DIASTOLIC BLOOD PRESSURE: 80 MMHG | WEIGHT: 192 LBS

## 2017-03-07 DIAGNOSIS — R93.89 ABNORMAL ULTRASOUND: ICD-10-CM

## 2017-03-07 DIAGNOSIS — R30.0 DYSURIA: ICD-10-CM

## 2017-03-07 DIAGNOSIS — R50.9 FEVER, UNSPECIFIED FEVER CAUSE: ICD-10-CM

## 2017-03-07 DIAGNOSIS — R52 POSTPARTUM PAIN: Primary | ICD-10-CM

## 2017-03-07 LAB
ALBUMIN UR-MCNC: NEGATIVE MG/DL
APPEARANCE UR: CLEAR
BASOPHILS # BLD AUTO: 0 10E9/L (ref 0–0.2)
BASOPHILS NFR BLD AUTO: 0.2 %
BILIRUB UR QL STRIP: NEGATIVE
COLOR UR AUTO: YELLOW
CRP SERPL-MCNC: 49.4 MG/L (ref 0–8)
DIFFERENTIAL METHOD BLD: ABNORMAL
EOSINOPHIL # BLD AUTO: 0.1 10E9/L (ref 0–0.7)
EOSINOPHIL NFR BLD AUTO: 1 %
ERYTHROCYTE [DISTWIDTH] IN BLOOD BY AUTOMATED COUNT: 15.3 % (ref 10–15)
FLUAV+FLUBV AG SPEC QL: NEGATIVE
FLUAV+FLUBV AG SPEC QL: NORMAL
GLUCOSE UR STRIP-MCNC: NEGATIVE MG/DL
HCT VFR BLD AUTO: 40.9 % (ref 35–47)
HGB BLD-MCNC: 13 G/DL (ref 11.7–15.7)
HGB UR QL STRIP: ABNORMAL
IMM GRANULOCYTES # BLD: 0 10E9/L (ref 0–0.4)
IMM GRANULOCYTES NFR BLD: 0.2 %
KETONES UR STRIP-MCNC: NEGATIVE MG/DL
LEUKOCYTE ESTERASE UR QL STRIP: NEGATIVE
LYMPHOCYTES # BLD AUTO: 2.3 10E9/L (ref 0.8–5.3)
LYMPHOCYTES NFR BLD AUTO: 26.1 %
MCH RBC QN AUTO: 25.8 PG (ref 26.5–33)
MCHC RBC AUTO-ENTMCNC: 31.8 G/DL (ref 31.5–36.5)
MCV RBC AUTO: 81 FL (ref 78–100)
MONOCYTES # BLD AUTO: 0.8 10E9/L (ref 0–1.3)
MONOCYTES NFR BLD AUTO: 9.1 %
NEUTROPHILS # BLD AUTO: 5.6 10E9/L (ref 1.6–8.3)
NEUTROPHILS NFR BLD AUTO: 63.4 %
NITRATE UR QL: NEGATIVE
PH UR STRIP: 5.5 PH (ref 5–7)
PLATELET # BLD AUTO: 295 10E9/L (ref 150–450)
RBC # BLD AUTO: 5.03 10E12/L (ref 3.8–5.2)
RBC #/AREA URNS AUTO: ABNORMAL /HPF (ref 0–2)
SP GR UR STRIP: <=1.005 (ref 1–1.03)
SPECIMEN SOURCE: NORMAL
URN SPEC COLLECT METH UR: ABNORMAL
UROBILINOGEN UR STRIP-ACNC: 0.2 EU/DL (ref 0.2–1)
WBC # BLD AUTO: 8.9 10E9/L (ref 4–11)
WBC #/AREA URNS AUTO: ABNORMAL /HPF (ref 0–2)

## 2017-03-07 PROCEDURE — 87086 URINE CULTURE/COLONY COUNT: CPT | Performed by: PHYSICIAN ASSISTANT

## 2017-03-07 PROCEDURE — 86140 C-REACTIVE PROTEIN: CPT | Performed by: PHYSICIAN ASSISTANT

## 2017-03-07 PROCEDURE — 81001 URINALYSIS AUTO W/SCOPE: CPT | Performed by: PHYSICIAN ASSISTANT

## 2017-03-07 PROCEDURE — 87804 INFLUENZA ASSAY W/OPTIC: CPT | Performed by: PHYSICIAN ASSISTANT

## 2017-03-07 PROCEDURE — 87186 SC STD MICRODIL/AGAR DIL: CPT | Performed by: PHYSICIAN ASSISTANT

## 2017-03-07 PROCEDURE — 36415 COLL VENOUS BLD VENIPUNCTURE: CPT | Performed by: PHYSICIAN ASSISTANT

## 2017-03-07 PROCEDURE — 85025 COMPLETE CBC W/AUTO DIFF WBC: CPT | Performed by: PHYSICIAN ASSISTANT

## 2017-03-07 PROCEDURE — 99214 OFFICE O/P EST MOD 30 MIN: CPT | Mod: 24 | Performed by: PHYSICIAN ASSISTANT

## 2017-03-07 PROCEDURE — 87088 URINE BACTERIA CULTURE: CPT | Performed by: PHYSICIAN ASSISTANT

## 2017-03-07 ASSESSMENT — PAIN SCALES - GENERAL: PAINLEVEL: MODERATE PAIN (4)

## 2017-03-07 NOTE — NURSING NOTE
"Chief Complaint   Patient presents with     Dysuria     x 1 week       Initial /80 (BP Location: Left arm, Patient Position: Chair, Cuff Size: Adult Regular)  Pulse 78  Temp 99.3  F (37.4  C) (Tympanic)  Resp 16  Wt 192 lb (87.1 kg)  LMP 05/12/2016 (Approximate)  BMI 30.07 kg/m2 Estimated body mass index is 30.07 kg/(m^2) as calculated from the following:    Height as of 1/11/17: 5' 7\" (1.702 m).    Weight as of this encounter: 192 lb (87.1 kg).  Medication Reconciliation: complete   Denisse Landaverde CMA (AAMA)   "

## 2017-03-07 NOTE — MR AVS SNAPSHOT
After Visit Summary   3/7/2017    Paulette Singer    MRN: [id number]           Patient Information     Date Of Birth          1985        Visit Information        Provider Department      3/7/2017 4:00 PM Sona Borja PA-C [facility]        Today's Diagnoses     Postpartum pain- pelvic    -  1    Dysuria        Fever, unspecified fever cause        Abnormal ultrasound           Follow-ups after your visit        Your next 10 appointments already scheduled     Apr 13, 2017 11:00 AM CDT   Post Partum with Sona Borja PA-C   [facility]    [address] [place] [id number] [phone]              Who to contact     If you have questions or need follow up information about today's clinic visit or your schedule please contact [facility] directly at [phone].  Normal or non-critical lab and imaging results will be communicated to you by MyChart, letter or phone within 4 business days after the clinic has received the results. If you do not hear from us within 7 days, please contact the clinic through [employer] or phone. If you have a critical or abnormal lab result, we will notify you by phone as soon as possible.  Submit refill requests through [employer] or call your pharmacy and they will forward the refill request to us. Please allow 3 business days for your refill to be completed.          Additional Information About Your Visit        MyChart Information     [employer] gives you secure access to your electronic health record. If you see a primary care provider, you can also send messages to your care team and make appointments. If you have questions, please call your primary care clinic.  If you do not have a primary care provider, please call [phone] and they will assist you.        Care EveryWhere ID     This is your Care EveryWhere ID. This could be used by other organizations to access  your [facility] medical records  [id number]        Your Vitals Were     Pulse Temperature Respirations Last Period BMI (Body Mass Index)       78 99.3  F (37.4  C) (Tympanic) 16 05/12/2016 (Approximate) 30.07 kg/m2        Blood Pressure from Last 3 Encounters:   03/07/17 140/80   02/26/17 117/74   02/25/17 137/78    Weight from Last 3 Encounters:   03/07/17 192 lb (87.1 kg)   02/22/17 209 lb (94.8 kg)   02/15/17 207 lb 8 oz (94.1 kg)              We Performed the Following     CBC with platelets differential     CRP inflammation     Influenza A/B antigen     UA with Microscopic     Urine Culture Aerobic Bacterial          Today's Medication Changes          These changes are accurate as of: 3/7/17 11:59 PM.  If you have any questions, ask your nurse or doctor.               Start taking these medicines.        Dose/Directions    methylergonovine 0.2 MG tablet   Commonly known as:  METHERGINE   Used for:  Abnormal ultrasound   Started by:  Sona Borja PA-C        Dose:  0.2 mg   Take 1 tablet (200 mcg) by mouth every 4 hours   Quantity:  6 tablet   Refills:  0       phenazopyridine 200 MG tablet   Commonly known as:  PYRIDIUM   Used for:  Dysuria, Fever, unspecified fever cause   Started by:  Sona Borja PA-C        Dose:  200 mg   Take 1 tablet (200 mg) by mouth 3 times daily as needed for irritation   Quantity:  6 tablet   Refills:  0       sulfamethoxazole-trimethoprim 800-160 MG per tablet   Commonly known as:  BACTRIM DS/SEPTRA DS   Used for:  Dysuria, Fever, unspecified fever cause   Started by:  Sona Borja PA-C        Dose:  1 tablet   Take 1 tablet by mouth 2 times daily   Quantity:  14 tablet   Refills:  0            Where to get your medicines      These medications were sent to [place] [address] [place] [address] [address]    Hours:  M-F 9-7 SAT 9-6 SUN 11-3 Phone:  [phone]     methylergonovine 0.2 MG tablet     phenazopyridine 200 MG tablet    sulfamethoxazole-trimethoprim 800-160 MG per tablet                Primary Care Provider Office Phone # Fax #    Sona Borja PA-C [phone]       [address] [place] [address]        Thank you!     Thank you for choosing [facility]  for your care. Our goal is always to provide you with excellent care. Hearing back from our patients is one way we can continue to improve our services. Please take a few minutes to complete the written survey that you may receive in the mail after your visit with us. Thank you!             Your Updated Medication List - Protect others around you: Learn how to safely use, store and throw away your medicines at www.disposemymeds.org.          This list is accurate as of: 3/7/17 11:59 PM.  Always use your most recent med list.                   Brand Name Dispense Instructions for use    ibuprofen 400 MG tablet    ADVIL/MOTRIN    120 tablet    Take 1-2 tablets (400-800 mg) by mouth every 6 hours as needed for other (cramping)       lanolin ointment     30 g    Apply topically every hour as needed for dry skin (sore nipples)       methylergonovine 0.2 MG tablet    METHERGINE    6 tablet    Take 1 tablet (200 mcg) by mouth every 4 hours       phenazopyridine 200 MG tablet    PYRIDIUM    6 tablet    Take 1 tablet (200 mg) by mouth 3 times daily as needed for irritation       prenatal multivitamin  plus iron 27-0.8 MG Tabs per tablet     100 tablet    Take 1 tablet by mouth daily       sulfamethoxazole-trimethoprim 800-160 MG per tablet    BACTRIM DS/SEPTRA DS    14 tablet    Take 1 tablet by mouth 2 times daily       vitamin D 2000 UNITS tablet     100 tablet    Take 2,000 Units by mouth daily

## 2017-03-07 NOTE — PROGRESS NOTES
"  SUBJECTIVE:                                                    Paulette Singer is a 31 year old female currently 13 days postpartum from [facility] with third baby who presents to clinic today for the following health issues:      URINARY TRACT SYMPTOMS  Was treated with Cephalexin through an ED visit on . Took meds & though her symptoms have improved, but now is experiencing \"spasm in the low pelvis\", fevers, generalized feelings of illness including headaches, malaise and myalgias.  Just not feeling well generally. Did not get a flu shot, but not experiencing any URI/cough symptoms. No rashes.  Breast feeding her . No signs of mastitis (has hx of this with previous child)  Rest of family healthy. No sick contacts.     Lochia flow seems normal to her. No odor or vaginal discharge. No large clots.     Onset: x 1 week    Description:   Painful urination (Dysuria): no   Blood in urine (Hematuria): no   Delay in urine (Hesitency): no     Intensity: mild    Progression of Symptoms:  improving    Accompanying Signs & Symptoms:  Fever/chills: YES  Flank pain YES  Nausea and vomiting: no   Any vaginal symptoms: none  Abdominal/Pelvic Pain: YES   History:   History of frequent UTI's: YES- when patient was younger  History of kidney stones: no   Sexually Active: no   Possibility of pregnancy: Patient just had a baby    Precipitating factors:   none         Therapies Tried and outcome: Increase fluid intake and OTC advil or tylenol, cranberry pills             Problem list and histories reviewed & adjusted, as indicated.  Additional history: as documented    Past Medical History   Diagnosis Date     Headache 2007     Low ferritin 2016     3rd pregnancy     Migraines age 17     migraine/sinus headaches, better with excedrin     Plantar fasciitis 2009     See podiatry consult     Past Surgical History   Procedure Laterality Date     Hc ugi endoscopy, simple exam  2007     Pelvis laparoscopy,dx  2009 "     normal     Insert intrauterine device  6/2009     Mirena IUD under anesthetic     Hernia repair, inguinal rt/lt       bilateral as a baby     Mouth surgery       Schlater teeth     Social History   Substance Use Topics     Smoking status: Never Smoker     Smokeless tobacco: Never Used     Alcohol use 0.0 oz/week     0 Standard drinks or equivalent per week      Comment: wine on occassion but not after12/6/11     Family History   Problem Relation Age of Onset     Depression Mother      Thyroid Disease Mother      Gynecology Sister      endometriosis     CANCER Paternal Grandmother      eye        Allergies   Allergen Reactions     Amoxicillin Rash     Penicillins Rash     Current Outpatient Prescriptions   Medication Sig Dispense Refill     sulfamethoxazole-trimethoprim (BACTRIM DS/SEPTRA DS) 800-160 MG per tablet Take 1 tablet by mouth 2 times daily 14 tablet 0     phenazopyridine (PYRIDIUM) 200 MG tablet Take 1 tablet (200 mg) by mouth 3 times daily as needed for irritation 6 tablet 0     methylergonovine (METHERGINE) 0.2 MG tablet Take 1 tablet (200 mcg) by mouth every 4 hours 6 tablet 0     Prenatal Vit-Fe Fumarate-FA (PRENATAL MULTIVITAMIN  PLUS IRON) 27-0.8 MG TABS Take 1 tablet by mouth daily 100 tablet 3     ibuprofen (ADVIL/MOTRIN) 400 MG tablet Take 1-2 tablets (400-800 mg) by mouth every 6 hours as needed for other (cramping) 120 tablet 1     lanolin ointment Apply topically every hour as needed for dry skin (sore nipples) 30 g 3     Cholecalciferol (VITAMIN D) 2000 UNITS tablet Take 2,000 Units by mouth daily 100 tablet 3           Reviewed and updated as needed this visit by clinical staff  Tobacco  Allergies  Meds       Reviewed and updated as needed this visit by Provider         ROS:  Constitutional, HEENT, cardiovascular, pulmonary, gi and gu systems are negative, except as otherwise noted.    OBJECTIVE:                                                    /80 (BP Location: Left arm, Patient  Position: Chair, Cuff Size: Adult Regular)  Pulse 78  Temp 99.3  F (37.4  C) (Tympanic)  Resp 16  Wt 192 lb (87.1 kg)  LMP 05/12/2016 (Approximate)  BMI 30.07 kg/m2  Body mass index is 30.07 kg/(m^2).   GENERAL: alert, mild distress, pale and tearful  EYES: Eyes grossly normal to inspection, PERRL and conjunctivae and sclerae normal  HENT: ear canals and TM's normal, nose and mouth without ulcers or lesions  NECK: no adenopathy, no asymmetry, masses, or scars and thyroid normal to palpation  RESP: lungs clear to auscultation - no rales, rhonchi or wheezes  CV: regular rate and rhythm, normal S1 S2, no S3 or S4, no murmur, click or rub, no peripheral edema and peripheral pulses strong  ABDOMEN: soft, mild to moderate tenderness suprapubic region, no hepatosplenomegaly, no masses and bowel sounds normal    MS: no gross musculoskeletal defects noted, no edema  SKIN: no suspicious lesions or rashes    Diagnostic Test Results:  Results for orders placed or performed in visit on 03/07/17   UA with Microscopic   Result Value Ref Range    Color Urine Yellow     Appearance Urine Clear     Glucose Urine Negative NEG mg/dL    Bilirubin Urine Negative NEG    Ketones Urine Negative NEG mg/dL    Specific Gravity Urine <=1.005 1.003 - 1.035    pH Urine 5.5 5.0 - 7.0 pH    Protein Albumin Urine Negative NEG mg/dL    Urobilinogen Urine 0.2 0.2 - 1.0 EU/dL    Nitrite Urine Negative NEG    Blood Urine Trace (A) NEG    Leukocyte Esterase Urine Negative NEG    Source Unspecified Urine     WBC Urine O - 2 0 - 2 /HPF    RBC Urine O - 2 0 - 2 /HPF   CBC with platelets differential   Result Value Ref Range    WBC 8.9 4.0 - 11.0 10e9/L    RBC Count 5.03 3.8 - 5.2 10e12/L    Hemoglobin 13.0 11.7 - 15.7 g/dL    Hematocrit 40.9 35.0 - 47.0 %    MCV 81 78 - 100 fl    MCH 25.8 (L) 26.5 - 33.0 pg    MCHC 31.8 31.5 - 36.5 g/dL    RDW 15.3 (H) 10.0 - 15.0 %    Platelet Count 295 150 - 450 10e9/L    Diff Method Automated Method     % Neutrophils 63.4  %    % Lymphocytes 26.1 %    % Monocytes 9.1 %    % Eosinophils 1.0 %    % Basophils 0.2 %    % Immature Granulocytes 0.2 %    Absolute Neutrophil 5.6 1.6 - 8.3 10e9/L    Absolute Lymphocytes 2.3 0.8 - 5.3 10e9/L    Absolute Monocytes 0.8 0.0 - 1.3 10e9/L    Absolute Eosinophils 0.1 0.0 - 0.7 10e9/L    Absolute Basophils 0.0 0.0 - 0.2 10e9/L    Abs Immature Granulocytes 0.0 0 - 0.4 10e9/L   CRP inflammation   Result Value Ref Range    CRP Inflammation 49.4 (H) 0.0 - 8.0 mg/L   Urine Culture Aerobic Bacterial   Result Value Ref Range    Specimen Description Unspecified Urine     Culture Micro >100,000 colonies/mL Escherichia coli (A)     Micro Report Status FINAL 03/09/2017     Organism: >100,000 colonies/mL Escherichia coli        Susceptibility    >100,000 colonies/ml escherichia coli (deb) -  (no method available)     AMPICILLIN 4 Susceptible  ug/mL     CEFAZOLIN Value in next row  ug/mL      <=4 SusceptibleCefazolin DEB breakpoints are for the treatment of uncomplicated urinary tract infections.  For the treatment of systemic infections, please contact the laboratory for additional testing.     CEFOXITIN Value in next row  ug/mL      <=4 SusceptibleCefazolin DEB breakpoints are for the treatment of uncomplicated urinary tract infections.  For the treatment of systemic infections, please contact the laboratory for additional testing.     CEFTAZIDIME Value in next row  ug/mL      <=4 SusceptibleCefazolin DEB breakpoints are for the treatment of uncomplicated urinary tract infections.  For the treatment of systemic infections, please contact the laboratory for additional testing.     CEFTRIAXONE Value in next row  ug/mL      <=4 SusceptibleCefazolin DEB breakpoints are for the treatment of uncomplicated urinary tract infections.  For the treatment of systemic infections, please contact the laboratory for additional testing.     CIPROFLOXACIN Value in next row  ug/mL      <=4 SusceptibleCefazolin DEB breakpoints are  for the treatment of uncomplicated urinary tract infections.  For the treatment of systemic infections, please contact the laboratory for additional testing.     GENTAMICIN Value in next row  ug/mL      <=4 SusceptibleCefazolin LIO breakpoints are for the treatment of uncomplicated urinary tract infections.  For the treatment of systemic infections, please contact the laboratory for additional testing.     LEVOFLOXACIN Value in next row  ug/mL      <=4 SusceptibleCefazolin LIO breakpoints are for the treatment of uncomplicated urinary tract infections.  For the treatment of systemic infections, please contact the laboratory for additional testing.     NITROFURANTOIN Value in next row  ug/mL      <=4 SusceptibleCefazolin LIO breakpoints are for the treatment of uncomplicated urinary tract infections.  For the treatment of systemic infections, please contact the laboratory for additional testing.     TOBRAMYCIN Value in next row  ug/mL      <=4 SusceptibleCefazolin LIO breakpoints are for the treatment of uncomplicated urinary tract infections.  For the treatment of systemic infections, please contact the laboratory for additional testing.     Trimethoprim/Sulfa Value in next row  ug/mL      <=4 SusceptibleCefazolin LIO breakpoints are for the treatment of uncomplicated urinary tract infections.  For the treatment of systemic infections, please contact the laboratory for additional testing.     AMPICILLIN/SULBACTAM Value in next row  ug/mL      <=4 SusceptibleCefazolin LIO breakpoints are for the treatment of uncomplicated urinary tract infections.  For the treatment of systemic infections, please contact the laboratory for additional testing.     Piperacillin/Tazo Value in next row  ug/mL      <=4 SusceptibleCefazolin LIO breakpoints are for the treatment of uncomplicated urinary tract infections.  For the treatment of systemic infections, please contact the laboratory for additional testing.     CEFEPIME Value in  next row  ug/mL      <=4 SusceptibleCefazolin LIO breakpoints are for the treatment of uncomplicated urinary tract infections.  For the treatment of systemic infections, please contact the laboratory for additional testing.   Influenza A/B antigen   Result Value Ref Range    Influenza A/B Agn Specimen Nasopharyngeal     Influenza A Negative NEG    Influenza B  NEG     Negative   Test results must be correlated with clinical data. If necessary, results   should be confirmed by a molecular assay or viral culture.          ASSESSMENT:                                                       Dysuria  Fever, unspecified fever cause  Postpartum pain  Abnormal ultrasound      PLAN:                                                        ICD-10-CM    1. Postpartum pain- pelvic O90.89 US Pelvic Complete w Transvaginal    R52 CANCELED: US Pelvic Complete with Transvaginal   2. Dysuria R30.0 UA with Microscopic     Urine Culture Aerobic Bacterial     sulfamethoxazole-trimethoprim (BACTRIM DS/SEPTRA DS) 800-160 MG per tablet     phenazopyridine (PYRIDIUM) 200 MG tablet   3. Fever, unspecified fever cause R50.9 Influenza A/B antigen     CBC with platelets differential     CRP inflammation     sulfamethoxazole-trimethoprim (BACTRIM DS/SEPTRA DS) 800-160 MG per tablet     phenazopyridine (PYRIDIUM) 200 MG tablet   4. Abnormal ultrasound R93.8 methylergonovine (METHERGINE) 0.2 MG tablet           Started with UA, CBC, crp & flu swab.  All really appeared unremarkable. Next day UC cultured positive. Was treated with Bactrim & Pyridium.  A pelvic US had already been ordered for day after visit & I urged her to continue on with this to r/o any problems with retained placenta.  She agreed - I then did see results on that end of day - appears ? For retained products but not likely. Discussed with Dr. purdy who delivered her.  He had inspected the placenta and he does not feel this is the issue, but advised to start Methergine. Patient  called and went ahead and ordered.  She will keep in close contact with me regarding her concerns. Push fluids.     If doing well will f/u at 6 WPP.     Sona Borja PA-C  [facility]    Orders Placed This Encounter     US Pelvic Complete w Transvaginal     UA with Microscopic     CBC with platelets differential     CRP inflammation     sulfamethoxazole-trimethoprim (BACTRIM DS/SEPTRA DS) 800-160 MG per tablet     phenazopyridine (PYRIDIUM) 200 MG tablet     methylergonovine (METHERGINE) 0.2 MG tablet       Chart documentation done in part with Dragon Voice recognition Software. Although reviewed after completion, some word and grammatical error may remain.  AVS given to patient upon discharge today.  Electronically signed by Sona Borja PA-C  March 10, 2017  12:58 PM

## 2017-03-08 ENCOUNTER — HOSPITAL ENCOUNTER (OUTPATIENT)
Dept: ULTRASOUND IMAGING | Facility: CLINIC | Age: 32
Discharge: HOME | End: 2017-03-08
Attending: PHYSICIAN ASSISTANT | Admitting: PHYSICIAN ASSISTANT
Payer: COMMERCIAL

## 2017-03-08 DIAGNOSIS — R52 POSTPARTUM PAIN: ICD-10-CM

## 2017-03-08 DIAGNOSIS — Z53.9 ERRONEOUS ENCOUNTER--DISREGARD: Primary | ICD-10-CM

## 2017-03-08 PROCEDURE — 76830 TRANSVAGINAL US NON-OB: CPT

## 2017-03-08 RX ORDER — PHENAZOPYRIDINE HYDROCHLORIDE 200 MG/1
200 TABLET, FILM COATED ORAL 3 TIMES DAILY PRN
Qty: 6 TABLET | Refills: 0 | Status: SHIPPED | OUTPATIENT
Start: 2017-03-08 | End: 2017-04-13

## 2017-03-08 RX ORDER — SULFAMETHOXAZOLE/TRIMETHOPRIM 800-160 MG
1 TABLET ORAL 2 TIMES DAILY
Qty: 14 TABLET | Refills: 0 | Status: SHIPPED | OUTPATIENT
Start: 2017-03-08 | End: 2017-03-28

## 2017-03-08 RX ORDER — METHYLERGONOVINE MALEATE 0.2 MG/1
0.2 TABLET ORAL EVERY 4 HOURS
Qty: 6 TABLET | Refills: 0 | Status: SHIPPED | OUTPATIENT
Start: 2017-03-08 | End: 2017-04-13

## 2017-03-09 LAB
BACTERIA SPEC CULT: ABNORMAL
MICRO REPORT STATUS: ABNORMAL
MICROORGANISM SPEC CULT: ABNORMAL
SPECIMEN SOURCE: ABNORMAL

## 2017-03-09 NOTE — PROGRESS NOTES
Called patient with results - did discuss this with Dr. Hill. Suggested Methergine 0.2 mg ×6 doses based 4 hours apart. Reviewed with the patient that she would be very crampy with this and to continue with ibuprofen. Any concerns or question should alert us or the OB department.

## 2017-03-12 ENCOUNTER — MYC MEDICAL ADVICE (OUTPATIENT)
Dept: FAMILY MEDICINE | Facility: CLINIC | Age: 32
End: 2017-03-12

## 2017-03-12 DIAGNOSIS — R30.0 DYSURIA: Primary | ICD-10-CM

## 2017-03-17 ENCOUNTER — TELEPHONE (OUTPATIENT)
Dept: OBGYN | Facility: CLINIC | Age: 32
End: 2017-03-17

## 2017-03-17 ENCOUNTER — HOSPITAL ENCOUNTER (EMERGENCY)
Facility: CLINIC | Age: 32
Discharge: HOME | End: 2017-03-17
Attending: PHYSICIAN ASSISTANT | Admitting: PHYSICIAN ASSISTANT
Payer: COMMERCIAL

## 2017-03-17 ENCOUNTER — APPOINTMENT (OUTPATIENT)
Dept: ULTRASOUND IMAGING | Facility: CLINIC | Age: 32
End: 2017-03-17
Attending: PHYSICIAN ASSISTANT
Payer: COMMERCIAL

## 2017-03-17 ENCOUNTER — MYC MEDICAL ADVICE (OUTPATIENT)
Dept: FAMILY MEDICINE | Facility: CLINIC | Age: 32
End: 2017-03-17

## 2017-03-17 VITALS
RESPIRATION RATE: 20 BRPM | HEART RATE: 61 BPM | SYSTOLIC BLOOD PRESSURE: 133 MMHG | WEIGHT: 188 LBS | TEMPERATURE: 98 F | DIASTOLIC BLOOD PRESSURE: 75 MMHG | BODY MASS INDEX: 29.44 KG/M2 | OXYGEN SATURATION: 99 %

## 2017-03-17 DIAGNOSIS — N30.00 ACUTE CYSTITIS WITHOUT HEMATURIA: ICD-10-CM

## 2017-03-17 DIAGNOSIS — N93.9 VAGINAL BLEEDING: ICD-10-CM

## 2017-03-17 LAB
ALBUMIN UR-MCNC: NEGATIVE MG/DL
ANION GAP SERPL CALCULATED.3IONS-SCNC: 9 MMOL/L (ref 3–14)
APPEARANCE UR: CLEAR
BACTERIA #/AREA URNS HPF: ABNORMAL /HPF
BASOPHILS # BLD AUTO: 0 10E9/L (ref 0–0.2)
BASOPHILS NFR BLD AUTO: 0.4 %
BILIRUB UR QL STRIP: NEGATIVE
BUN SERPL-MCNC: 26 MG/DL (ref 7–30)
CALCIUM SERPL-MCNC: 8.6 MG/DL (ref 8.5–10.1)
CHLORIDE SERPL-SCNC: 107 MMOL/L (ref 94–109)
CO2 SERPL-SCNC: 26 MMOL/L (ref 20–32)
COLOR UR AUTO: YELLOW
CREAT SERPL-MCNC: 0.94 MG/DL (ref 0.52–1.04)
DIFFERENTIAL METHOD BLD: ABNORMAL
EOSINOPHIL # BLD AUTO: 0.1 10E9/L (ref 0–0.7)
EOSINOPHIL NFR BLD AUTO: 1 %
ERYTHROCYTE [DISTWIDTH] IN BLOOD BY AUTOMATED COUNT: 15 % (ref 10–15)
GFR SERPL CREATININE-BSD FRML MDRD: 70 ML/MIN/1.7M2
GLUCOSE SERPL-MCNC: 90 MG/DL (ref 70–99)
GLUCOSE UR STRIP-MCNC: NEGATIVE MG/DL
HCT VFR BLD AUTO: 42.7 % (ref 35–47)
HGB BLD-MCNC: 13.7 G/DL (ref 11.7–15.7)
HGB UR QL STRIP: ABNORMAL
IMM GRANULOCYTES # BLD: 0 10E9/L (ref 0–0.4)
IMM GRANULOCYTES NFR BLD: 0.2 %
KETONES UR STRIP-MCNC: NEGATIVE MG/DL
LEUKOCYTE ESTERASE UR QL STRIP: ABNORMAL
LYMPHOCYTES # BLD AUTO: 3.8 10E9/L (ref 0.8–5.3)
LYMPHOCYTES NFR BLD AUTO: 42.4 %
MCH RBC QN AUTO: 25.7 PG (ref 26.5–33)
MCHC RBC AUTO-ENTMCNC: 32.1 G/DL (ref 31.5–36.5)
MCV RBC AUTO: 80 FL (ref 78–100)
MONOCYTES # BLD AUTO: 0.6 10E9/L (ref 0–1.3)
MONOCYTES NFR BLD AUTO: 7.1 %
NEUTROPHILS # BLD AUTO: 4.4 10E9/L (ref 1.6–8.3)
NEUTROPHILS NFR BLD AUTO: 48.9 %
NITRATE UR QL: NEGATIVE
NON-SQ EPI CELLS #/AREA URNS LPF: ABNORMAL /LPF
PH UR STRIP: 5.5 PH (ref 5–7)
PLATELET # BLD AUTO: 297 10E9/L (ref 150–450)
POTASSIUM SERPL-SCNC: 4.1 MMOL/L (ref 3.4–5.3)
RBC # BLD AUTO: 5.33 10E12/L (ref 3.8–5.2)
RBC #/AREA URNS AUTO: ABNORMAL /HPF (ref 0–2)
SODIUM SERPL-SCNC: 142 MMOL/L (ref 133–144)
SP GR UR STRIP: 1.01 (ref 1–1.03)
URN SPEC COLLECT METH UR: ABNORMAL
UROBILINOGEN UR STRIP-ACNC: 0.2 EU/DL (ref 0.2–1)
WBC # BLD AUTO: 8.9 10E9/L (ref 4–11)
WBC #/AREA URNS AUTO: ABNORMAL /HPF (ref 0–2)

## 2017-03-17 PROCEDURE — 81001 URINALYSIS AUTO W/SCOPE: CPT | Performed by: FAMILY MEDICINE

## 2017-03-17 PROCEDURE — 99284 EMERGENCY DEPT VISIT MOD MDM: CPT | Mod: 25

## 2017-03-17 PROCEDURE — 87186 SC STD MICRODIL/AGAR DIL: CPT | Performed by: PHYSICIAN ASSISTANT

## 2017-03-17 PROCEDURE — 80048 BASIC METABOLIC PNL TOTAL CA: CPT | Performed by: PHYSICIAN ASSISTANT

## 2017-03-17 PROCEDURE — 87086 URINE CULTURE/COLONY COUNT: CPT | Performed by: PHYSICIAN ASSISTANT

## 2017-03-17 PROCEDURE — 85025 COMPLETE CBC W/AUTO DIFF WBC: CPT | Performed by: PHYSICIAN ASSISTANT

## 2017-03-17 PROCEDURE — 87088 URINE BACTERIA CULTURE: CPT | Performed by: PHYSICIAN ASSISTANT

## 2017-03-17 PROCEDURE — 99284 EMERGENCY DEPT VISIT MOD MDM: CPT | Performed by: PHYSICIAN ASSISTANT

## 2017-03-17 PROCEDURE — 76830 TRANSVAGINAL US NON-OB: CPT

## 2017-03-17 RX ORDER — CEFDINIR 300 MG/1
300 CAPSULE ORAL 2 TIMES DAILY
Qty: 14 CAPSULE | Refills: 0 | Status: SHIPPED | OUTPATIENT
Start: 2017-03-17 | End: 2017-03-24

## 2017-03-17 ASSESSMENT — ENCOUNTER SYMPTOMS
VOMITING: 0
FEVER: 0
DYSURIA: 1
SHORTNESS OF BREATH: 0
NAUSEA: 0
FLANK PAIN: 0
DIARRHEA: 0
BACK PAIN: 1
CHILLS: 0
ABDOMINAL PAIN: 0
APPETITE CHANGE: 1

## 2017-03-17 NOTE — ED AVS SNAPSHOT
[facility] Emergency Department    [address] [place] [address]    Phone:  [phone]    Fax:  [phone]                                       Paulette Singer   MRN: [id number]    Department:  [facility] Emergency Department   Date of Visit:  3/17/2017           Patient Information     Date Of Birth          1985        Your diagnoses for this visit were:     Acute cystitis without hematuria     Vaginal bleeding        You were seen by China Diane PA-C.      Follow-up Information     Follow up with Stan Noriega MD In 5 days.    Specialty:  Family Practice    Why:  For ER follow up    Contact information:    [facility]  [address] [facility] [id number] [phone]          Follow up with [facility] Emergency [facility].    Specialty:  EMERGENCY MEDICINE    Why:  If symptoms worsen    Contact information:    [address] [place] [address] [address] [place] [address] [phone]    Additional information:    From [facility] [address]: Exit at [address] on [address] side of [place]. Turn right on [address]. Turn left at stoplight on [address]. [facility] will be in view two blocks ahead        Discharge Instructions       Take the entire course of antibiotics as prescribed to treat this UTI. This antibiotic should not interfere with breastfeeding. Follow up with Dr. Noriega next week in the clinic to discuss this ER visit.  Your bleeding you are experiencing is likely related to vaginal delivery and this should improve over time.    Thank you for choosing [facility] Emergency Department. It was a pleasure taking care of you today. If you have any questions, please call [phone].    China Diane PA-C          Dysfunctional Uterine Bleeding    Dysfunctional uterine bleeding is a condition in which bleeding is abnormal and occurs at unexpected times of the month. This happens due to changes in the hormones that help  control a woman s menstrual cycle each month.  The bleeding may be heavier or lighter than normal. If you have heavy bleeding often, this can lead to a problem called anemia. With anemia, your red blood cell count is too low. Red blood cells are needed because they help carry oxygen throughout your body. Severe anemia may cause you to look pale and feel very weak or tired. You might also become short of breath easily.  To treat dysfunctional uterine bleeding, medicines are often tried first. If these don t help, further testing and treatments may be needed. Discuss all of your options with your provider.  Home care  Medicines  If you re prescribed medicines, be sure to take them as directed. Some of the more common medicines you may be prescribed include:    Hormone therapy (Options include most methods of hormonal birth control such as pills, shots, or a hormone-releasing IUD)    Nonsteroidal anti-inflammatory drugs (NSAIDs), such as ibuprofen    Iron supplements, if you have anemia     General care    Get plenty of rest if you tire easily. Avoid heavy exertion.    To help relieve pain or cramping that may occur with bleeding, try using a heating pad on the lower belly or back. A warm bath may also help.  Follow-up care  Follow up with your healthcare provider as directed.  When to seek medical advice  Call your healthcare provider right away if:    Bleeding becomes heavy (soaking 1 pad or tampon every hour for 3 hours)    Increased abdominal pain    Irregular bleeding worsens or does not get better even with treatment    Fever of 100.4 F (38 C) or higher, or as directed by your provider    Signs of anemia, such as pale skin, extreme fatigue or weakness, or shortness of breath    Dizziness or fainting           Future Appointments        Provider Department [facility] Phone Center    3/22/2017 8:50 AM Stan Noriega MD, MD [facility] [phone] [facility]    4/13/2017 11:00 AM Sona Borja  PRIN[facility] [phone] [facility]      24 Hour Appointment Hotline       To make an appointment at any [facility], call [phone] ([phone]). If you don't have a family doctor or clinic, we will help you find one. [facility] are conveniently located to serve the needs of you and your family.             Review of your medicines      START taking        Dose / Directions Last dose taken    cefdinir 300 MG capsule   Commonly known as:  OMNICEF   Dose:  300 mg   Quantity:  14 capsule        Take 1 capsule (300 mg) by mouth 2 times daily for 7 days   Refills:  0          Our records show that you are taking the medicines listed below. If these are incorrect, please call your family doctor or clinic.        Dose / Directions Last dose taken    ibuprofen 400 MG tablet   Commonly known as:  ADVIL/MOTRIN   Dose:  400-800 mg   Quantity:  120 tablet        Take 1-2 tablets (400-800 mg) by mouth every 6 hours as needed for other (cramping)   Refills:  1        lanolin ointment   Quantity:  30 g        Apply topically every hour as needed for dry skin (sore nipples)   Refills:  3        methylergonovine 0.2 MG tablet   Commonly known as:  METHERGINE   Dose:  0.2 mg   Quantity:  6 tablet        Take 1 tablet (200 mcg) by mouth every 4 hours   Refills:  0        phenazopyridine 200 MG tablet   Commonly known as:  PYRIDIUM   Dose:  200 mg   Quantity:  6 tablet        Take 1 tablet (200 mg) by mouth 3 times daily as needed for irritation   Refills:  0        prenatal multivitamin  plus iron 27-0.8 MG Tabs per tablet   Dose:  1 tablet   Quantity:  100 tablet        Take 1 tablet by mouth daily   Refills:  3        sulfamethoxazole-trimethoprim 800-160 MG per tablet   Commonly known as:  BACTRIM DS/SEPTRA DS   Dose:  1 tablet   Quantity:  14 tablet        Take 1 tablet by mouth 2 times daily   Refills:  0        vitamin D 2000 UNITS tablet   Dose:  2000 Units   Quantity:  100 tablet         Take 2,000 Units by mouth daily   Refills:  3                Prescriptions were sent or printed at these locations (1 Prescription)                   [facility] [address] [place] [employer] - [address] [address]    Telephone:  [phone]   Fax:  [phone]   Hours:  M-F 9-7 SAT 9-6 SUN 11-3                E-Prescribed (1 of 1)         cefdinir (OMNICEF) 300 MG capsule                Procedures and tests performed during your visit     Basic metabolic panel    CBC with platelets differential    UA with Microscopic    US Pelvic Complete w Transvaginal    Urine Culture      Orders Needing Specimen Collection     None      Pending Results     Date and Time Order Name Status Description    3/17/2017 1616 Urine Culture In process     3/17/2017 1609 US Pelvic Complete w Transvaginal Preliminary             Pending Culture Results     Date and Time Order Name Status Description    3/17/2017 1616 Urine Culture In process             Thank you for choosing [facility]       Thank you for choosing [facility] for your care. Our goal is always to provide you with excellent care. Hearing back from our patients is one way we can continue to improve our services. Please take a few minutes to complete the written survey that you may receive in the mail after you visit with us. Thank you!        [employer] Information     [employer] gives you secure access to your electronic health record. If you see a primary care provider, you can also send messages to your care team and make appointments. If you have questions, please call your primary care clinic.  If you do not have a primary care provider, please call [phone] and they will assist you.        Care EveryWhere ID     This is your Care EveryWhere ID. This could be used by other organizations to access your [facility] medical records  [id number]        After Visit Summary       This is your record. Keep this with you and show to your  community pharmacist(s) and doctor(s) at your next visit.

## 2017-03-17 NOTE — TELEPHONE ENCOUNTER
" 3 weeks post partum pt called [facility] today stating, \"I feel like I have another bladder infection\".  Symptoms include cloudy urine, frequency, urgency & \"I have been passing more marble size clots\".  Pt also states \"I had an ultrasound done after I had my baby & they saw placenta pieces left in there, I have been working with Sona Borja & was given some meds\".  Thalia BAXTER (clinic assistant) was called to ask for available appt time for pt r/t H. Huong RAHMAN being out of the office today.  Thalia took pt's information & will check available times for an appointment today & call pt back.  Pt was called back & informed of plan of care & asked to call [facility] back if she did not have an appointment time to be seen today called to her within the hour, pt agreed with plan of care.  "

## 2017-03-17 NOTE — ED AVS SNAPSHOT
[facility] Emergency Department    [address] [place] [address]    Phone:  [phone]    Fax:  [phone]                                       Paulette Singer   MRN: [id number]    Department:  [facility] Emergency Department   Date of Visit:  3/17/2017           After Visit Summary Signature Page     I have received my discharge instructions, and my questions have been answered. I have discussed any challenges I see with this plan with the nurse or doctor.    ..........................................................................................................................................  Patient/Patient Representative Signature      ..........................................................................................................................................  Patient Representative Print Name and Relationship to Patient    ..................................................               ................................................  Date                                            Time    ..........................................................................................................................................  Reviewed by Signature/Title    ...................................................              ..............................................  Date                                                            Time

## 2017-03-17 NOTE — ED PROVIDER NOTES
"  History     Chief Complaint   Patient presents with     Abdominal Pain     HPI  Paulette Singer is a 31 year old female who presents to the emergency department with pelvic pain.  She complains of spasming pain in her pelvis, increased vaginal bleeding of \"chunks\", low back pain, and dysuria with cloudy urine.  She is 3 weeks postpartum. Three weeks ago (3 days after delivery) she was seen in the emergency department and diagnosed with a UTI. She was placed on cephalexin, but then presented to the clinic a week later and found to have UTI based on urine cultures, and given Bactrim. She also had a pelvic ultrasound done at that time due to increased vaginal bleeding was found to have questionable retained products of conception.  She was started on Methergine and that seemed to help things, but in the last couple days bleeding has worsened and now urine symptoms are back.  She denies fever or chills, nausea or vomiting, flank pain.  She does report decreased appetite.  She has not been sexually active since delivery.  She is soaking through 2 very large pads throughout the day and 2 pads at night.    I have reviewed the Medications, Allergies, Past Medical and Surgical History, and Social History in the Epic system.    Review of Systems   Constitutional: Positive for appetite change. Negative for chills and fever.   Respiratory: Negative for shortness of breath.    Cardiovascular: Negative for chest pain.   Gastrointestinal: Negative for abdominal pain, diarrhea, nausea and vomiting.   Genitourinary: Positive for dysuria, pelvic pain and vaginal bleeding. Negative for flank pain, vaginal discharge and vaginal pain.   Musculoskeletal: Positive for back pain (low).   All other systems reviewed and are negative.      Physical Exam   BP: 133/75  Pulse: 61  Temp: 98  F (36.7  C)  Resp: 20  Weight: 85.3 kg (188 lb)  SpO2: 99 %  Physical Exam   Constitutional: She is oriented to person, place, and time. She appears " well-developed and well-nourished. No distress.   HENT:   Head: Normocephalic and atraumatic.   Eyes: Conjunctivae are normal. No scleral icterus.   Neck: Normal range of motion.   Cardiovascular: Normal rate, regular rhythm and normal heart sounds.    Pulmonary/Chest: Effort normal and breath sounds normal. No respiratory distress.   Abdominal: Soft. There is tenderness (suprapubic).   Genitourinary: Vagina normal. Cervix exhibits discharge (dark brown-red). Cervix exhibits no motion tenderness.   Musculoskeletal:        Lumbar back: She exhibits tenderness (thru left low back musculature). She exhibits no bony tenderness, no swelling and no deformity.   Neurological: She is alert and oriented to person, place, and time.   Skin: Skin is warm and dry. She is not diaphoretic.   Psychiatric: She has a normal mood and affect.   Nursing note and vitals reviewed.      ED Course     ED Course     Procedures  None     Results for orders placed or performed during the hospital encounter of 03/17/17 (from the past 24 hour(s))   UA with Microscopic   Result Value Ref Range    Color Urine Yellow     Appearance Urine Clear     Glucose Urine Negative NEG mg/dL    Bilirubin Urine Negative NEG    Ketones Urine Negative NEG mg/dL    Specific Gravity Urine 1.010 1.003 - 1.035    pH Urine 5.5 5.0 - 7.0 pH    Protein Albumin Urine Negative NEG mg/dL    Urobilinogen Urine 0.2 0.2 - 1.0 EU/dL    Nitrite Urine Negative NEG    Blood Urine Moderate (A) NEG    Leukocyte Esterase Urine Small (A) NEG    Source Unspecified Urine     WBC Urine 5-10 (A) 0 - 2 /HPF    RBC Urine 2-5 (A) 0 - 2 /HPF    Squamous Epithelial /LPF Urine Few FEW /LPF    Bacteria Urine Many (A) NEG /HPF   US Pelvic Complete w Transvaginal    Narrative    ULTRASOUND PELVIS WITH TRANSVAGINAL IMAGING  3/17/2017 5:28 PM     HISTORY: Pelvic pain, delivered three weeks ago, bleeding, concern for  retained products of conception.     COMPARISON: 3/8/2017    FINDINGS:  Transvaginal images were performed to better evaluate the  patient's uterus, ovaries and endometrial stripe.    No fibroids are evident. The uterus is normal. Endometrial stripe  measures 9 mm and is heterogeneous which may indicate some retained  blood postpartum. No definite color Doppler flow seen within it to  suggest retained products of conception, but this would be difficult  to entirely exclude. The right ovary is normal. The left ovary is not  seen. No adnexal masses are present. Moderate free pelvic fluid is  present.      Impression    IMPRESSION: Mildly heterogeneous contents of the endometrium which  could represent a small amount of blood, no definite color Doppler  flow. Retained products of conception would be difficult to entirely  exclude in this setting. Moderate free fluid.    CADENCE CAO MD   CBC with platelets differential   Result Value Ref Range    WBC 8.9 4.0 - 11.0 10e9/L    RBC Count 5.33 (H) 3.8 - 5.2 10e12/L    Hemoglobin 13.7 11.7 - 15.7 g/dL    Hematocrit 42.7 35.0 - 47.0 %    MCV 80 78 - 100 fl    MCH 25.7 (L) 26.5 - 33.0 pg    MCHC 32.1 31.5 - 36.5 g/dL    RDW 15.0 10.0 - 15.0 %    Platelet Count 297 150 - 450 10e9/L    Diff Method Automated Method     % Neutrophils 48.9 %    % Lymphocytes 42.4 %    % Monocytes 7.1 %    % Eosinophils 1.0 %    % Basophils 0.4 %    % Immature Granulocytes 0.2 %    Absolute Neutrophil 4.4 1.6 - 8.3 10e9/L    Absolute Lymphocytes 3.8 0.8 - 5.3 10e9/L    Absolute Monocytes 0.6 0.0 - 1.3 10e9/L    Absolute Eosinophils 0.1 0.0 - 0.7 10e9/L    Absolute Basophils 0.0 0.0 - 0.2 10e9/L    Abs Immature Granulocytes 0.0 0 - 0.4 10e9/L   Basic metabolic panel   Result Value Ref Range    Sodium 142 133 - 144 mmol/L    Potassium 4.1 3.4 - 5.3 mmol/L    Chloride 107 94 - 109 mmol/L    Carbon Dioxide 26 20 - 32 mmol/L    Anion Gap 9 3 - 14 mmol/L    Glucose 90 70 - 99 mg/dL    Urea Nitrogen 26 7 - 30 mg/dL    Creatinine 0.94 0.52 - 1.04 mg/dL    GFR Estimate 70 >60  mL/min/1.7m2    GFR Estimate If Black 85 >60 mL/min/1.7m2    Calcium 8.6 8.5 - 10.1 mg/dL       Medications - No data to display     Assessments & Plan (with Medical Decision Making)  Paulette Singer is a 31 year old female who presented to the emergency department complaining of urinary symptoms and pelvic pain with bleeding.  This is been ongoing issue since she delivered vaginally 3 weeks ago.  Initially it was thought that she had retained products of conception and she took Methergine for this which improved the bleeding but now it has come back.  She has also had a UTI and has been treated twice already for this, but she thinks it is recurring due to wearing pads.    On arrival to the emergency department she had normal vital signs.  She exhibited mild suprapubic tenderness on examination. Her urine was notable for small leukocyte esterase with 5-10 WBCs and bacteria, suggestive of a UTI.  Given her history of possible retained POC, an ultrasound was obtained today, results as noted above.  I called and spoke with Dr. Noriega, on-call ObGyn, regarding these findings.  He recommended checking a hemoglobin and doing a speculum exam to evaluate extent of bleeding.  Her hemoglobin was stable at 13.7, and vaginal exam showed small amount of dark brownish red discharge from the cervix but no significant amount of bleeding observed.  I discussed these findings with Dr. Noriega and he was reassured by this.  It is likely that the bleeding is likely related to postpartum recovery process.  He did not think a D&C was indicated at this point.  He recommended watchful waiting to see how this bleeding improves over the next few days to a week.  We were able to schedule an appointment for the patient with Dr. Noriega in the clinic next week for follow-up.  Regarding her urinary tract infection we'll treat this with cefdinir today as last urine culture appeared sensitive to this and it does not interfere with  breast-feeding.  She was given instructions that when she should return to the emergency department.  Otherwise all of her questions were answered and she was very agreeable to this plan and to discharge.       I have reviewed the nursing notes.    I have reviewed the findings, diagnosis, plan and need for follow up with the patient.    Discharge Medication List as of 3/17/2017  7:55 PM      START taking these medications    Details   cefdinir (OMNICEF) 300 MG capsule Take 1 capsule (300 mg) by mouth 2 times daily for 7 days, Disp-14 capsule, R-0, E-Prescribe             Final diagnoses:   Acute cystitis without hematuria   Vaginal bleeding       3/17/2017   [facility] EMERGENCY DEPARTMENT     China Diane PA-C  03/18/17 0011

## 2017-03-17 NOTE — TELEPHONE ENCOUNTER
Per RM patient needs to be seen through the ER. Per patient she is having pain and abnormal clotting and US showed retained products from recent pregnancy. Patient was informed to report to the ER and she is agreeable to this plan.   Jody Odom, CMA

## 2017-03-18 NOTE — DISCHARGE INSTRUCTIONS
Take the entire course of antibiotics as prescribed to treat this UTI. This antibiotic should not interfere with breastfeeding. Follow up with Dr. Noriega next week in the clinic to discuss this ER visit.  Your bleeding you are experiencing is likely related to vaginal delivery and this should improve over time.    Thank you for choosing [facility] Emergency Department. It was a pleasure taking care of you today. If you have any questions, please call [phone].    China Diane PA-C          Dysfunctional Uterine Bleeding    Dysfunctional uterine bleeding is a condition in which bleeding is abnormal and occurs at unexpected times of the month. This happens due to changes in the hormones that help control a woman s menstrual cycle each month.  The bleeding may be heavier or lighter than normal. If you have heavy bleeding often, this can lead to a problem called anemia. With anemia, your red blood cell count is too low. Red blood cells are needed because they help carry oxygen throughout your body. Severe anemia may cause you to look pale and feel very weak or tired. You might also become short of breath easily.  To treat dysfunctional uterine bleeding, medicines are often tried first. If these don t help, further testing and treatments may be needed. Discuss all of your options with your provider.  Home care  Medicines  If you re prescribed medicines, be sure to take them as directed. Some of the more common medicines you may be prescribed include:    Hormone therapy (Options include most methods of hormonal birth control such as pills, shots, or a hormone-releasing IUD)    Nonsteroidal anti-inflammatory drugs (NSAIDs), such as ibuprofen    Iron supplements, if you have anemia     General care    Get plenty of rest if you tire easily. Avoid heavy exertion.    To help relieve pain or cramping that may occur with bleeding, try using a heating pad on the lower belly or back. A warm bath may also  help.  Follow-up care  Follow up with your healthcare provider as directed.  When to seek medical advice  Call your healthcare provider right away if:    Bleeding becomes heavy (soaking 1 pad or tampon every hour for 3 hours)    Increased abdominal pain    Irregular bleeding worsens or does not get better even with treatment    Fever of 100.4 F (38 C) or higher, or as directed by your provider    Signs of anemia, such as pale skin, extreme fatigue or weakness, or shortness of breath    Dizziness or fainting

## 2017-03-20 ENCOUNTER — TELEPHONE (OUTPATIENT)
Dept: EMERGENCY MEDICINE | Facility: CLINIC | Age: 32
End: 2017-03-20

## 2017-03-20 NOTE — TELEPHONE ENCOUNTER
"[facility] Emergency Department Lab result notification:    [facility] ED lab result protocol used  Urine Culture Protcol    Reason for call  Notify of lab results, assess symptoms,  review ED providers recommendations/discharge instructions (if necessary) and advise per ED lab result f/u protocol    Lab Result  Final Urine Culture Report on 3/19/17  [facility] ED discharge antibiotic: Cefdinir (Omnicef) 300 mg capsule, 1 capsule (300 mg) by mouth 2 times daily for 7 days  #1. Bacteria, >100,000 colonies/mL Escherichia coli, is SUSCEPTIBLE to ED discharge antibiotic.    As per [facility] ED Lab Result protocol, no change in antibiotic therapy.  Information table from ED Provider visit on 03/17/2017  Symptoms reported at ED visit (Chief complaint, HPI) a 31 year old female who presents to the emergency department with pelvic pain. She complains of spasming pain in her pelvis, increased vaginal bleeding of \"chunks\", low back pain, and dysuria with cloudy urine. She is 3 weeks postpartum. Three weeks ago (3 days after delivery) she was seen in the emergency department and diagnosed with a UTI. She was placed on cephalexin, but then presented to the clinic a week later and found to have UTI based on urine cultures, and given Bactrim. She also had a pelvic ultrasound done at that time due to increased vaginal bleeding was found to have questionable retained products of conception. She was started on Methergine and that seemed to help things, but in the last couple days bleeding has worsened and now urine symptoms are back.  She denies fever or chills, nausea or vomiting, flank pain. She does report decreased appetite. She has not been sexually active since delivery. She is soaking through 2 very large pads throughout the day and 2 pads at night.   ED providers Impression and Plan (applicable information) Her urine was notable for small leukocyte esterase with 5-10 WBCs and bacteria, suggestive of a UTI. Given her " history of possible retained POC, an ultrasound was obtained today, results as noted above.  I called and spoke with Dr. Noriega, on-call ObGyn, regarding these findings. He recommended checking a hemoglobin and doing a speculum exam to evaluate extent of bleeding. Her hemoglobin was stable at 13.7, and vaginal exam showed small amount of dark brownish red discharge from the cervix but no significant amount of bleeding observed. I discussed these findings with Dr. Noriega and he was reassured by this. It is likely that the bleeding is likely related to postpartum recovery process. He did not think a D&C was indicated at this point. He recommended watchful waiting to see how this bleeding improves over the next few days to a week. We were able to schedule an appointment for the patient with Dr. Noriega in the clinic next week for follow-up. Regarding her urinary tract infection we'll treat this with cefdinir today as last urine culture appeared sensitive to this and it does not interfere with breast-feeding. She was given instructions that when she should return to the emergency department. Otherwise all of her questions were answered and she was very agreeable to this plan and to discharge     RN Assessment (Patient s current Symptoms), include time called.  [Insert Left message here if message left]  At 1629 I have definitely seen improvement.  This is the best antibiotic by far.   RN Recommendations/Instructions per [facility] ED lab result protocol    Please Contact your PCP clinic or return to the Emergency department if your:    Symptoms do not resolve after completing antibiotic.    Symptoms worsen or other concerning symptom's.    Bela Aquino RN  [facility] RN  Lung Nodule and ED Lab Result F/u RN  Epic pool (ED late result f/u RN): [id number]  [facility] INCIDENTAL RADIOLOGY F/U NURSES: P [id number]  # [phone]      Copy of Lab result   Exam Information   Exam Date Exam Time Accession # Results     3/17/17  3:45 PM [id number]    Component Results   Component Collected Lab   Specimen Description 03/17/2017  3:45 PM [facility]   Unspecified Urine   Culture Micro (Abnormal) 03/17/2017  3:45 PM [facility]   >100,000 colonies/mL Escherichia coli   Micro Report Status 03/17/2017  3:45 PM [facility]   FINAL 03/19/2017   Organism: 03/17/2017  3:45 PM [facility]   >100,000 colonies/mL Escherichia coli   Culture & Susceptibility   >100,000 COLONIES/ML ESCHERICHIA COLI (LIO)   Antibiotic Sensitivity Unit Status   AMPICILLIN 16 Intermediate ug/mL Final   AMPICILLIN/SULBACTAM 16 Intermediate ug/mL Final   CEFAZOLIN <=4 Susceptible  Cefazolin LIO breakpoints are for the treatment of uncomplicated urinary tract   infections.  For the treatment of systemic infections, please contact the   laboratory for additional testing. ug/mL Final   CEFEPIME <=1 Susceptible ug/mL Final   CEFOXITIN <=4 Susceptible ug/mL Final   CEFTAZIDIME <=1 Susceptible ug/mL Final   CEFTRIAXONE <=1 Susceptible ug/mL Final   CIPROFLOXACIN <=0.25 Susceptible ug/mL Final   GENTAMICIN <=1 Susceptible ug/mL Final   LEVOFLOXACIN <=0.12 Susceptible ug/mL Final   NITROFURANTOIN <=16 Susceptible ug/mL Final   Piperacillin/Tazo <=4 Susceptible ug/mL Final   TOBRAMYCIN 2 Susceptible ug/mL Final   Trimethoprim/Sulfa <=1/19 Susceptible ug/mL Final

## 2017-03-27 ENCOUNTER — MYC MEDICAL ADVICE (OUTPATIENT)
Dept: FAMILY MEDICINE | Facility: CLINIC | Age: 32
End: 2017-03-27

## 2017-03-27 DIAGNOSIS — N39.0 RECURRENT UTI: ICD-10-CM

## 2017-03-27 DIAGNOSIS — N32.89 BLADDER SPASM: Primary | ICD-10-CM

## 2017-03-27 NOTE — TELEPHONE ENCOUNTER
Can we see if urology can see her ASAP - today or tomorrow please? If not, I will need to see her tomorrow as I'm booked today.  Electronically signed by Sona Borja PA-C  3/27/2017

## 2017-03-28 ENCOUNTER — HOSPITAL ENCOUNTER (OUTPATIENT)
Dept: CT IMAGING | Facility: CLINIC | Age: 32
Discharge: HOME | End: 2017-03-28
Attending: PHYSICIAN ASSISTANT | Admitting: PHYSICIAN ASSISTANT
Payer: COMMERCIAL

## 2017-03-28 DIAGNOSIS — N39.0 RECURRENT UTI: ICD-10-CM

## 2017-03-28 DIAGNOSIS — N32.89 BLADDER SPASM: ICD-10-CM

## 2017-03-28 DIAGNOSIS — N39.0 URINARY TRACT INFECTION, SITE UNSPECIFIED: Primary | ICD-10-CM

## 2017-03-28 DIAGNOSIS — Q62.5 DOUBLE URETER ON LEFT: ICD-10-CM

## 2017-03-28 PROBLEM — R93.89 ABNORMAL CAT SCAN: Status: ACTIVE | Noted: 2017-03-28

## 2017-03-28 LAB
ALBUMIN UR-MCNC: 30 MG/DL
APPEARANCE UR: ABNORMAL
BACTERIA #/AREA URNS HPF: ABNORMAL /HPF
BILIRUB UR QL STRIP: NEGATIVE
COLOR UR AUTO: YELLOW
GLUCOSE UR STRIP-MCNC: NEGATIVE MG/DL
HGB UR QL STRIP: ABNORMAL
KETONES UR STRIP-MCNC: NEGATIVE MG/DL
LEUKOCYTE ESTERASE UR QL STRIP: ABNORMAL
MUCOUS THREADS #/AREA URNS LPF: PRESENT /LPF
NITRATE UR QL: POSITIVE
PH UR STRIP: 5 PH (ref 5–7)
RBC #/AREA URNS AUTO: 7 /HPF (ref 0–2)
SP GR UR STRIP: 1.02 (ref 1–1.03)
URN SPEC COLLECT METH UR: ABNORMAL
UROBILINOGEN UR STRIP-MCNC: 0 MG/DL (ref 0–2)
WBC #/AREA URNS AUTO: >182 /HPF (ref 0–2)
WBC CLUMPS #/AREA URNS HPF: PRESENT /HPF

## 2017-03-28 PROCEDURE — 87088 URINE BACTERIA CULTURE: CPT | Performed by: PHYSICIAN ASSISTANT

## 2017-03-28 PROCEDURE — 87186 SC STD MICRODIL/AGAR DIL: CPT | Performed by: PHYSICIAN ASSISTANT

## 2017-03-28 PROCEDURE — 81001 URINALYSIS AUTO W/SCOPE: CPT | Performed by: PHYSICIAN ASSISTANT

## 2017-03-28 PROCEDURE — 74177 CT ABD & PELVIS W/CONTRAST: CPT

## 2017-03-28 PROCEDURE — 25000125 ZZHC RX 250: Performed by: RADIOLOGY

## 2017-03-28 PROCEDURE — 25500064 ZZH RX 255 OP 636: Performed by: RADIOLOGY

## 2017-03-28 PROCEDURE — 87086 URINE CULTURE/COLONY COUNT: CPT | Performed by: PHYSICIAN ASSISTANT

## 2017-03-28 RX ORDER — IOPAMIDOL 755 MG/ML
500 INJECTION, SOLUTION INTRAVASCULAR ONCE
Status: COMPLETED | OUTPATIENT
Start: 2017-03-28 | End: 2017-03-28

## 2017-03-28 RX ORDER — CEFDINIR 300 MG/1
600 CAPSULE ORAL DAILY
Qty: 20 CAPSULE | Refills: 0 | Status: SHIPPED | OUTPATIENT
Start: 2017-03-28 | End: 2017-04-07

## 2017-03-28 RX ADMIN — SODIUM CHLORIDE 75 ML: 9 INJECTION, SOLUTION INTRAVENOUS at 09:57

## 2017-03-28 RX ADMIN — IOPAMIDOL 100 ML: 755 INJECTION, SOLUTION INTRAVENOUS at 09:57

## 2017-03-28 NOTE — PROGRESS NOTES
Patient is seeing Dr. White in urology Thursday - will have him review CT with patient. She is aware of this.

## 2017-03-28 NOTE — PROGRESS NOTES
Patient called with positive UA-this is pending culture. Given the significance of this sample and the fact the patient  is symptomatic, will start antibiotic. She was last done. I reviewed previous culture from about 11 days ago which is more sensitive to cephalosporins-will start Omnicef. She does have allergy to amoxicillin/penicillin with a rash which occurred as a child. Did let her know she has attended 15% chance of cross reactivity. Be seen Dr. White on Thursday, CT scan was done today. 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 He will review CT scan results with her at that appointment.    Orders Placed This Encounter     cefdinir (OMNICEF) 300 MG capsule       Chart documentation done in part with Dragon Voice recognition Software. Although reviewed after completion, some word and grammatical error may remain.  AVS given to patient upon discharge today.  Electronically signed by Sona Borja PA-C  March 28, 2017  10:35 AM

## 2017-03-30 ENCOUNTER — TRANSFERRED RECORDS (OUTPATIENT)
Dept: HEALTH INFORMATION MANAGEMENT | Facility: CLINIC | Age: 32
End: 2017-03-30

## 2017-03-31 ENCOUNTER — TELEPHONE (OUTPATIENT)
Dept: FAMILY MEDICINE | Facility: CLINIC | Age: 32
End: 2017-03-31

## 2017-03-31 NOTE — TELEPHONE ENCOUNTER
Reason for call:  Pt is calling and she has some questions about medications that she is needing to be on. She was told she needs to take antibiotics and ibuprofen which she is not comfortable doing because she is nursing and she will have to be off of the ibuprofen once she has her procedure. Please advise.

## 2017-03-31 NOTE — TELEPHONE ENCOUNTER
I reviewed her chart.  Not seeing where she was told to take ibuprofen and what the purpose of this medication is for.  As far as taking the antibiotic or ibuprofen during pregnancy is concerned, this is 100% safe and no risk to nursing baby.      Will have staff notify patient.    Gino Zarate MD

## 2017-04-03 ENCOUNTER — TELEPHONE (OUTPATIENT)
Dept: FAMILY MEDICINE | Facility: CLINIC | Age: 32
End: 2017-04-03

## 2017-04-03 NOTE — TELEPHONE ENCOUNTER
It is normal to be on antibiotics daily prior to these types of surgeries and to help prevent infection.  Her medication concerns and symptoms need to be directed to Dr. White - please check to see if he's in [place] today as he usually is on  Mondays. Discuss further plan with him please for direction.    Electronically signed by Sona Borja PA-C  4/3/2017

## 2017-04-03 NOTE — TELEPHONE ENCOUNTER
[facility] phone call message- patient reporting a symptom:    Symptom or request: Recurring infection    Duration (how long have symptoms been present): recurring - has been seen for this several times  Have you been treated for this before? Yes    Additional comments: Patient has appointment on April 13th for postpartum check and is wondering if Sona will do her PreOp on the same day.  States that the Procedure is scheduled for May 4th is with urologist Dr. White.    Call taken on 4/3/2017 at 8:18 AM by Colleen Norton

## 2017-04-03 NOTE — TELEPHONE ENCOUNTER
Called pt and informed her of the below msg and  is no in clinic today in [place]. I did give her the number to  to call and see what she needs to do.  Lara Peralta MA

## 2017-04-03 NOTE — TELEPHONE ENCOUNTER
I'm confused with this message. Is the patient having recurring bladder infection symptoms or is she requesting to be seen for a preop the same day as her postpartum visit. These are 2 completely separate visits, but cannot be done the same day given the documentation and the questioning that goes on with these visits. It's essentially an issue with billing. Postpartum visits are part of OB care-preop's are completely separate. Probably better to go ahead and do the preop to get that done on the upcoming visit and can move her postpartum out a few weeks.    Electronically signed by Sona Borja PA-C  4/3/2017

## 2017-04-03 NOTE — TELEPHONE ENCOUNTER
Called pt and she wanted to know if she could be seen at the same time for both preop and post partum. I informed her that due to billing that can't be done at the same time. I made her an appt for her preop later in the month. She also wanted to know Sona's opinion on the antibiotic, she states that urology wants her on the antibiotic until she is seen on 5/4 for her surgery and she states that they are making her little shoshana gassy and says it isn't helping with her UTI sx's. And the ibuprofen and Tylenol is not working for her pain. She says she is having body aches, fever due to her UTI.   Lara Peralta MA

## 2017-04-05 NOTE — TELEPHONE ENCOUNTER
Her urine culture shows coverage of all antibiotics including the one that she is on & will continue.  She should continue taking the medications, pushing water and cranberry pills as well.  Electronically signed by Sona Borja PA-C  4/5/2017

## 2017-04-05 NOTE — TELEPHONE ENCOUNTER
Pt was told by Dr White's office to contact PCP's office to see if the antibiotic given to pt by [facility] is the correct medication? Should this antibiotic treat the results of the culture? Pt is still having symptoms. Please call. [phone].

## 2017-04-06 ENCOUNTER — TELEPHONE (OUTPATIENT)
Dept: FAMILY MEDICINE | Facility: CLINIC | Age: 32
End: 2017-04-06

## 2017-04-06 DIAGNOSIS — Q62.5 DOUBLE URETER ON LEFT: ICD-10-CM

## 2017-04-06 DIAGNOSIS — R10.9 LEFT FLANK PAIN, CHRONIC: Primary | ICD-10-CM

## 2017-04-06 DIAGNOSIS — G89.29 LEFT FLANK PAIN, CHRONIC: Primary | ICD-10-CM

## 2017-04-06 NOTE — TELEPHONE ENCOUNTER
Reason for Call: Request for an order or referral:    Order or referral being requested: urology     Date needed: as soon as possible    Has the patient been seen by the PCP for this problem? YES    Additional comments: patient wouldn't say much about this only that she needs a referral for a new urologist because [facility] can't get her in until the 4th. Please advise    Phone number Patient can be reached at:  Home number on file [phone] (home)    Best Time:  any    Can we leave a detailed message on this number?  YES    Call taken on 4/6/2017 at 3:43 PM by Pratima Ortega

## 2017-04-06 NOTE — TELEPHONE ENCOUNTER
Patient notified of below by VM. Authorization in chart.  Denisse Landaverde CMA ([facility])

## 2017-04-06 NOTE — TELEPHONE ENCOUNTER
Pt stated her one month old gets very gassy when pt is on the antibiotic. Any suggestions? Also pt is wondering if [facility] would have a suggestion on who she should see in place of Dr White. His adeline is really busy and pt would like to be seen sooner. Please call.

## 2017-04-07 ENCOUNTER — MYC MEDICAL ADVICE (OUTPATIENT)
Dept: FAMILY MEDICINE | Facility: CLINIC | Age: 32
End: 2017-04-07

## 2017-04-07 DIAGNOSIS — N39.0 URINARY TRACT INFECTION, SITE UNSPECIFIED: ICD-10-CM

## 2017-04-07 RX ORDER — CEFDINIR 300 MG/1
600 CAPSULE ORAL DAILY
Qty: 8 CAPSULE | Refills: 0 | Status: SHIPPED | OUTPATIENT
Start: 2017-04-07 | End: 2017-04-13

## 2017-04-07 NOTE — TELEPHONE ENCOUNTER
Patient notified per Sona Borja PA-C that she can use gas drops. Patient has no further question or concerns.  Denisse Landaverde CMA ([facility])

## 2017-04-10 ENCOUNTER — MYC MEDICAL ADVICE (OUTPATIENT)
Dept: FAMILY MEDICINE | Facility: CLINIC | Age: 32
End: 2017-04-10

## 2017-04-10 DIAGNOSIS — N39.0 URINARY TRACT INFECTION, SITE UNSPECIFIED: ICD-10-CM

## 2017-04-10 LAB
ALBUMIN UR-MCNC: NEGATIVE MG/DL
APPEARANCE UR: CLEAR
BILIRUB UR QL STRIP: NEGATIVE
COLOR UR AUTO: YELLOW
GLUCOSE UR STRIP-MCNC: NEGATIVE MG/DL
HGB UR QL STRIP: NEGATIVE
KETONES UR STRIP-MCNC: NEGATIVE MG/DL
LEUKOCYTE ESTERASE UR QL STRIP: ABNORMAL
MUCOUS THREADS #/AREA URNS LPF: PRESENT /LPF
NITRATE UR QL: NEGATIVE
PH UR STRIP: 6 PH (ref 5–7)
RBC #/AREA URNS AUTO: <1 /HPF (ref 0–2)
SP GR UR STRIP: 1.02 (ref 1–1.03)
SQUAMOUS #/AREA URNS AUTO: <1 /HPF (ref 0–1)
URN SPEC COLLECT METH UR: ABNORMAL
UROBILINOGEN UR STRIP-MCNC: 0 MG/DL (ref 0–2)
WBC #/AREA URNS AUTO: 5 /HPF (ref 0–2)

## 2017-04-10 PROCEDURE — 81001 URINALYSIS AUTO W/SCOPE: CPT | Performed by: NURSE PRACTITIONER

## 2017-04-13 ENCOUNTER — PRENATAL OFFICE VISIT (OUTPATIENT)
Dept: FAMILY MEDICINE | Facility: CLINIC | Age: 32
End: 2017-04-13
Payer: COMMERCIAL

## 2017-04-13 VITALS
HEART RATE: 84 BPM | HEIGHT: 67 IN | RESPIRATION RATE: 16 BRPM | WEIGHT: 184.75 LBS | SYSTOLIC BLOOD PRESSURE: 120 MMHG | BODY MASS INDEX: 29 KG/M2 | DIASTOLIC BLOOD PRESSURE: 70 MMHG | TEMPERATURE: 97.6 F

## 2017-04-13 DIAGNOSIS — Z01.818 PREOP GENERAL PHYSICAL EXAM: ICD-10-CM

## 2017-04-13 DIAGNOSIS — Z01.818 PRE-OPERATIVE EXAMINATION: Primary | ICD-10-CM

## 2017-04-13 DIAGNOSIS — R79.0 LOW FERRITIN: ICD-10-CM

## 2017-04-13 DIAGNOSIS — Q62.5 DOUBLE URETER ON LEFT: ICD-10-CM

## 2017-04-13 DIAGNOSIS — N39.0 RECURRENT URINARY TRACT INFECTION: ICD-10-CM

## 2017-04-13 PROBLEM — Z34.90 PREGNANCY: Status: RESOLVED | Noted: 2017-02-23 | Resolved: 2017-04-13

## 2017-04-13 PROCEDURE — 99214 OFFICE O/P EST MOD 30 MIN: CPT | Performed by: PHYSICIAN ASSISTANT

## 2017-04-13 ASSESSMENT — PAIN SCALES - GENERAL: PAINLEVEL: MODERATE PAIN (4)

## 2017-04-13 NOTE — MR AVS SNAPSHOT
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 This includes signs of a cold or flu (such as a sore throat, runny nose, cough, rash or fever).    Do not smoke, drink alcohol or take over the counter medicine (unless your surgeon or primary care doctor tells you to) for the 24 hours before and after surgery.    If you take prescribed drugs: Follow your doctor s orders about which medicines to take and which to stop until after surgery.    Eating and drinking prior to surgery: follow the instructions from your surgeon    Take a shower or bath the night before surgery. Use the soap your surgeon gave you to gently clean your skin. If you do not have soap from your surgeon, use your regular soap. Do not shave or scrub the surgery site.  Wear clean pajamas and have clean sheets on your bed.         Follow-ups after your visit        Your next 10 appointments already scheduled     Apr 19, 2017   Procedure with Jeferson White MD   [facility] (--)    [address]  [facility] [address]   [phone]            Apr 26, 2017  3:00 PM CDT   [facility] Visit with S[facility]-C   [facility]    [address] [facility] [address] [phone]           Bring a current list of meds and any records pertaining to this  "visit.  For Physicals, please bring immunization records and any forms needing to be filled out.  Please arrive 10 minutes early to complete paperwork.              Who to contact     If you have questions or need follow up information about today's clinic visit or your schedule please contact [facility] directly at [phone].  Normal or non-critical lab and imaging results will be communicated to you by MyChart, letter or phone within 4 business days after the clinic has received the results. If you do not hear from us within 7 days, please contact the clinic through [employer] or phone. If you have a critical or abnormal lab result, we will notify you by phone as soon as possible.  Submit refill requests through [employer] or call your pharmacy and they will forward the refill request to us. Please allow 3 business days for your refill to be completed.          Additional Information About Your Visit        MyChart Information     [employer] gives you secure access to your electronic health record. If you see a primary care provider, you can also send messages to your care team and make appointments. If you have questions, please call your primary care clinic.  If you do not have a primary care provider, please call [phone] and they will assist you.        Care EveryWhere ID     This is your Care EveryWhere ID. This could be used by other organizations to access your [facility] medical records  [id number]        Your Vitals Were     Pulse Temperature Respirations Height Last Period BMI (Body Mass Index)    84 97.6  F (36.4  C) (Tympanic) 16 5' 7\" (1.702 m) 05/12/2016 (Approximate) 28.94 kg/m2       Blood Pressure from Last 3 Encounters:   04/13/17 120/70   03/17/17 133/75   03/07/17 140/80    Weight from Last 3 Encounters:   04/13/17 184 lb 12 oz (83.8 kg)   03/17/17 188 lb (85.3 kg)   03/07/17 192 lb (87.1 kg)              We Performed the Following     CBC with platelets     Ferritin        " Primary Care Provider Office Phone # Fax #    Sona Borja PA-C [phone]       [address] [place] [address]        Thank you!     Thank you for choosing [facility]  for your care. Our goal is always to provide you with excellent care. Hearing back from our patients is one way we can continue to improve our services. Please take a few minutes to complete the written survey that you may receive in the mail after your visit with us. Thank you!             Your Updated Medication List - Protect others around you: Learn how to safely use, store and throw away your medicines at www.disposemymeds.org.          This list is accurate as of: 4/13/17 11:49 AM.  Always use your most recent med list.                   Brand Name Dispense Instructions for use    ibuprofen 400 MG tablet    ADVIL/MOTRIN    120 tablet    Take 1-2 tablets (400-800 mg) by mouth every 6 hours as needed for other (cramping)

## 2017-04-13 NOTE — PROGRESS NOTES
[address] [address]  [phone]  Dept: [phone]    PRE-OP EVALUATION:  Today's date: 2017    Paulette Singer (: 1985) presents for pre-operative evaluation assessment as requested by Dr. White.  She requires evaluation and anesthesia risk assessment prior to undergoing surgery/procedure for treatment of Chronic UTI .  Proposed procedure: CYSTOSCOPY, BILATERAL RETROGRADES    This is also her postpartum exam-delivered 7 weeks ago .    Date of Surgery/ Procedure: 17  Time of Surgery/ Procedure: 17  Hospital/Surgical Facility: [facility]  Fax number for surgical facility:   Primary Physician: Sona Borja  Type of Anesthesia Anticipated: General    Patient has a Health Care Directive or Living Will:  NO    1. NO - Do you have a history of heart attack, stroke, stent, bypass or surgery on an artery in the head, neck, heart or legs?  2. NO - Do you ever have any pain or discomfort in your chest?  3. NO - Do you have a history of  Heart Failure?  4. NO - Are you troubled by shortness of breath when: walking on the level, up a slight hill or at night?  5. NO - Do you currently have a cold, bronchitis or other respiratory infection?  6. NO - Do you have a cough, shortness of breath or wheezing?  7. NO - Do you sometimes get pains in the calves of your legs when you walk?  8. NO - Do you or anyone in your family have previous history of blood clots?  9. NO - Do you or does anyone in your family have a serious bleeding problem such as prolonged bleeding following surgeries or cuts?  10. NO - Have you ever had problems with anemia or been told to take iron pills?  11. NO - Have you had any abnormal blood loss such as black, tarry or bloody stools, or abnormal vaginal bleeding?  12. NO - Have you ever had a blood transfusion?  13. NO - Have you or any of your relatives ever had problems with anesthesia?  14. NO - Do you have sleep apnea,  excessive snoring or daytime drowsiness?  15. NO - Do you have any prosthetic heart valves?  16. NO - Do you have prosthetic joints?  17. NO - Is there any chance that you may be pregnant?      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 Chronic recurrent UTI. See urology consultation regarding plan.      See problem list for active medical problems.  Problems all longstanding and stable, except as noted/documented.  See ROS for pertinent symptoms related to these conditions.                                                                                                  .    MEDICAL HISTORY:                                                      Patient Active Problem List    Diagnosis Date Noted     Abnormal CAT scan 03/28/2017     Priority: Medium     bilateral duplicate renal       Pregnancy 02/23/2017     Priority: Medium     History of oligohydramnios in prior pregnancy, currently pregnant in third trimester 12/01/2016     Priority: Medium     Low ferritin 11/01/2016     Priority: Medium     3rd pregnancy       Immunization refused-INFLUENZA & TDAP [id number] 09/08/2016     Priority: Medium     Encounter for supervision of other normal pregnancy, unspecified trimester 08/08/2016     Priority: Medium     Interface dermatitis-forehead positive biopsy 11/04/2015     Priority: Medium     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     Priority: Medium     CARDIOVASCULAR SCREENING; LDL GOAL LESS THAN 160 10/31/2010     Priority: Medium      Past Medical History:   Diagnosis Date     Abnormal CAT scan 03/28/2017    bilateral duplicate renal     Headache 9/4/2007     Low ferritin 11/2016    3rd pregnancy     Migraines age 17    migraine/sinus headaches, better with excedrin     Plantar fasciitis 2/25/2009    See podiatry consult     Past Surgical History:   Procedure Laterality Date      UGI ENDOSCOPY, SIMPLE EXAM  " 09/11/2007     HERNIA REPAIR, INGUINAL RT/LT      bilateral as a baby     INSERT INTRAUTERINE DEVICE  6/2009    Mirena IUD under anesthetic     MOUTH SURGERY      [place] teeth     PELVIS LAPAROSCOPY,DX  6/2009    normal     Current Outpatient Prescriptions   Medication Sig Dispense Refill     ibuprofen (ADVIL/MOTRIN) 400 MG tablet Take 1-2 tablets (400-800 mg) by mouth every 6 hours as needed for other (cramping) 120 tablet 1     OTC products: None, except as noted above    Allergies   Allergen Reactions     Amoxicillin Rash     Penicillins Rash      Latex Allergy: NO    Social History   Substance Use Topics     Smoking status: Never Smoker     Smokeless tobacco: Never Used     Alcohol use 0.0 oz/week     0 Standard drinks or equivalent per week      Comment: wine on occassion but not after12/6/11     History   Drug Use No       REVIEW OF SYSTEMS:                                                    Constitutional, neuro, ENT, endocrine, pulmonary, cardiac, gastrointestinal, genitourinary, musculoskeletal, integument and psychiatric systems are negative, except as otherwise noted.    EXAM:                                                    /70  Pulse 84  Temp 97.6  F (36.4  C) (Tympanic)  Resp 16  Ht 5' 7\" (1.702 m)  Wt 184 lb 12 oz (83.8 kg)  LMP 05/12/2016 (Approximate)  BMI 28.94 kg/m2 POSTPARTUM AND BREAST FEEDING - NO MENSTRUAL CYCLE SINCE BIRTH OF BABY 2 MONTHS AGO.    GENERAL APPEARANCE: healthy, alert and no distress     EYES: EOMI, PERRL     HENT: ear canals and TM's normal and nose and mouth without ulcers or lesions     NECK: no adenopathy, no asymmetry, masses, or scars and thyroid normal to palpation     RESP: lungs clear to auscultation - no rales, rhonchi or wheezes     CV: regular rates and rhythm, normal S1 S2, no S3 or S4 and no murmur, click or rub     ABDOMEN:  soft, nontender, no HSM or masses and bowel sounds normal     : normal cervix, adnexae, and uterus without masses or " discharge and rectal exam normal without masses-guaiac negative stool     MS: extremities normal- no gross deformities noted, no evidence of inflammation in joints, FROM in all extremities.     SKIN: no suspicious lesions or rashes     NEURO: Normal strength and tone, sensory exam grossly normal, mentation intact and speech normal     PSYCH: mentation appears normal. and affect normal/bright     LYMPHATICS: No axillary, cervical, or supraclavicular nodes    DIAGNOSTICS:                                                    CBC and ferritin are drawn today. Patient will need to have an hCG morning of surgery.    Recent Labs   Lab Test  03/17/17   1820  03/07/17   1739   11/01/15   2105 10/19/14   HGB  13.7  13.0   < >  14.5   --    PLT  297  295   < >  207   --    INR   --    --    --    --   0.66   NA  142   --    --   139   --    POTASSIUM  4.1   --    --   3.8   --    CR  0.94   --    --   0.87   --     < > = values in this interval not displayed.        IMPRESSION:                                                    Reason for surgery/procedure: *Recurrent UTI, known double ureter left side  Diagnosis/reason for consult: Preop physical exam     The proposed surgical procedure is considered LOW risk.    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    APPROVAL GIVEN to proceed with  proposed procedure, without further diagnostic evaluation       Signed Electronically by: Sona Borja PA-C    Copy of this evaluation report is provided to requesting physician.    [place] Preop Guidelines

## 2017-04-13 NOTE — NURSING NOTE
"Chief Complaint   Patient presents with     Post Partum Exam       Initial /70  Pulse 84  Temp 97.6  F (36.4  C) (Tympanic)  Resp 16  Ht 5' 7\" (1.702 m)  Wt 184 lb 12 oz (83.8 kg)  LMP 05/12/2016 (Approximate)  BMI 28.94 kg/m2 Estimated body mass index is 28.94 kg/(m^2) as calculated from the following:    Height as of this encounter: 5' 7\" (1.702 m).    Weight as of this encounter: 184 lb 12 oz (83.8 kg).  Medication Reconciliation: complete   Denisse Landaverde CMA (AAMA)   "

## 2017-04-14 NOTE — H&P (VIEW-ONLY)
[address] [address]  [phone]  Dept: [phone]    PRE-OP EVALUATION:  Today's date: 2017    Paulette Singer (: 1985) presents for pre-operative evaluation assessment as requested by Dr. White.  She requires evaluation and anesthesia risk assessment prior to undergoing surgery/procedure for treatment of Chronic UTI .  Proposed procedure: CYSTOSCOPY, BILATERAL RETROGRADES    This is also her postpartum exam-delivered 7 weeks ago .    Date of Surgery/ Procedure: 17  Time of Surgery/ Procedure: 17  Hospital/Surgical Facility: [facility]  Fax number for surgical facility:   Primary Physician: Sona Borja  Type of Anesthesia Anticipated: General    Patient has a Health Care Directive or Living Will:  NO    1. NO - Do you have a history of heart attack, stroke, stent, bypass or surgery on an artery in the head, neck, heart or legs?  2. NO - Do you ever have any pain or discomfort in your chest?  3. NO - Do you have a history of  Heart Failure?  4. NO - Are you troubled by shortness of breath when: walking on the level, up a slight hill or at night?  5. NO - Do you currently have a cold, bronchitis or other respiratory infection?  6. NO - Do you have a cough, shortness of breath or wheezing?  7. NO - Do you sometimes get pains in the calves of your legs when you walk?  8. NO - Do you or anyone in your family have previous history of blood clots?  9. NO - Do you or does anyone in your family have a serious bleeding problem such as prolonged bleeding following surgeries or cuts?  10. NO - Have you ever had problems with anemia or been told to take iron pills?  11. NO - Have you had any abnormal blood loss such as black, tarry or bloody stools, or abnormal vaginal bleeding?  12. NO - Have you ever had a blood transfusion?  13. NO - Have you or any of your relatives ever had problems with anesthesia?  14. NO - Do you have sleep apnea,  excessive snoring or daytime drowsiness?  15. NO - Do you have any prosthetic heart valves?  16. NO - Do you have prosthetic joints?  17. NO - Is there any chance that you may be pregnant?      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 Chronic recurrent UTI. See urology consultation regarding plan.      See problem list for active medical problems.  Problems all longstanding and stable, except as noted/documented.  See ROS for pertinent symptoms related to these conditions.                                                                                                  .    MEDICAL HISTORY:                                                      Patient Active Problem List    Diagnosis Date Noted     Abnormal CAT scan 03/28/2017     Priority: Medium     bilateral duplicate renal       Pregnancy 02/23/2017     Priority: Medium     History of oligohydramnios in prior pregnancy, currently pregnant in third trimester 12/01/2016     Priority: Medium     Low ferritin 11/01/2016     Priority: Medium     3rd pregnancy       Immunization refused-INFLUENZA & TDAP [id number] 09/08/2016     Priority: Medium     Encounter for supervision of other normal pregnancy, unspecified trimester 08/08/2016     Priority: Medium     Interface dermatitis-forehead positive biopsy 11/04/2015     Priority: Medium     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     Priority: Medium     CARDIOVASCULAR SCREENING; LDL GOAL LESS THAN 160 10/31/2010     Priority: Medium      Past Medical History:   Diagnosis Date     Abnormal CAT scan 03/28/2017    bilateral duplicate renal     Headache 9/4/2007     Low ferritin 11/2016    3rd pregnancy     Migraines age 17    migraine/sinus headaches, better with excedrin     Plantar fasciitis 2/25/2009    See podiatry consult     Past Surgical History:   Procedure Laterality Date      UGI ENDOSCOPY, SIMPLE EXAM  " 09/11/2007     HERNIA REPAIR, INGUINAL RT/LT      bilateral as a baby     INSERT INTRAUTERINE DEVICE  6/2009    Mirena IUD under anesthetic     MOUTH SURGERY      [place] teeth     PELVIS LAPAROSCOPY,DX  6/2009    normal     Current Outpatient Prescriptions   Medication Sig Dispense Refill     ibuprofen (ADVIL/MOTRIN) 400 MG tablet Take 1-2 tablets (400-800 mg) by mouth every 6 hours as needed for other (cramping) 120 tablet 1     OTC products: None, except as noted above    Allergies   Allergen Reactions     Amoxicillin Rash     Penicillins Rash      Latex Allergy: NO    Social History   Substance Use Topics     Smoking status: Never Smoker     Smokeless tobacco: Never Used     Alcohol use 0.0 oz/week     0 Standard drinks or equivalent per week      Comment: wine on occassion but not after12/6/11     History   Drug Use No       REVIEW OF SYSTEMS:                                                    Constitutional, neuro, ENT, endocrine, pulmonary, cardiac, gastrointestinal, genitourinary, musculoskeletal, integument and psychiatric systems are negative, except as otherwise noted.    EXAM:                                                    /70  Pulse 84  Temp 97.6  F (36.4  C) (Tympanic)  Resp 16  Ht 5' 7\" (1.702 m)  Wt 184 lb 12 oz (83.8 kg)  LMP 05/12/2016 (Approximate)  BMI 28.94 kg/m2 POSTPARTUM AND BREAST FEEDING - NO MENSTRUAL CYCLE SINCE BIRTH OF BABY 2 MONTHS AGO.    GENERAL APPEARANCE: healthy, alert and no distress     EYES: EOMI, PERRL     HENT: ear canals and TM's normal and nose and mouth without ulcers or lesions     NECK: no adenopathy, no asymmetry, masses, or scars and thyroid normal to palpation     RESP: lungs clear to auscultation - no rales, rhonchi or wheezes     CV: regular rates and rhythm, normal S1 S2, no S3 or S4 and no murmur, click or rub     ABDOMEN:  soft, nontender, no HSM or masses and bowel sounds normal     : normal cervix, adnexae, and uterus without masses or " discharge and rectal exam normal without masses-guaiac negative stool     MS: extremities normal- no gross deformities noted, no evidence of inflammation in joints, FROM in all extremities.     SKIN: no suspicious lesions or rashes     NEURO: Normal strength and tone, sensory exam grossly normal, mentation intact and speech normal     PSYCH: mentation appears normal. and affect normal/bright     LYMPHATICS: No axillary, cervical, or supraclavicular nodes    DIAGNOSTICS:                                                    CBC and ferritin are drawn today. Patient will need to have an hCG morning of surgery.    Recent Labs   Lab Test  03/17/17   1820  03/07/17   1739   11/01/15   2105 10/19/14   HGB  13.7  13.0   < >  14.5   --    PLT  297  295   < >  207   --    INR   --    --    --    --   0.66   NA  142   --    --   139   --    POTASSIUM  4.1   --    --   3.8   --    CR  0.94   --    --   0.87   --     < > = values in this interval not displayed.        IMPRESSION:                                                    Reason for surgery/procedure: *Recurrent UTI, known double ureter left side  Diagnosis/reason for consult: Preop physical exam     The proposed surgical procedure is considered LOW risk.    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    APPROVAL GIVEN to proceed with  proposed procedure, without further diagnostic evaluation       Signed Electronically by: Sona Borja PA-C    Copy of this evaluation report is provided to requesting physician.    [place] Preop Guidelines

## 2017-04-18 ENCOUNTER — MYC MEDICAL ADVICE (OUTPATIENT)
Dept: FAMILY MEDICINE | Facility: CLINIC | Age: 32
End: 2017-04-18

## 2017-04-18 DIAGNOSIS — R79.0 LOW FERRITIN: ICD-10-CM

## 2017-04-18 DIAGNOSIS — Z01.818 PREOP GENERAL PHYSICAL EXAM: ICD-10-CM

## 2017-04-18 DIAGNOSIS — Z01.818 PRE-OPERATIVE EXAMINATION: ICD-10-CM

## 2017-04-18 DIAGNOSIS — R30.0 DYSURIA: ICD-10-CM

## 2017-04-18 LAB
ERYTHROCYTE [DISTWIDTH] IN BLOOD BY AUTOMATED COUNT: 16 % (ref 10–15)
FERRITIN SERPL-MCNC: 20 NG/ML (ref 12–150)
HCT VFR BLD AUTO: 43.3 % (ref 35–47)
HGB BLD-MCNC: 14.3 G/DL (ref 11.7–15.7)
MCH RBC QN AUTO: 26.2 PG (ref 26.5–33)
MCHC RBC AUTO-ENTMCNC: 33 G/DL (ref 31.5–36.5)
MCV RBC AUTO: 79 FL (ref 78–100)
PLATELET # BLD AUTO: 236 10E9/L (ref 150–450)
RBC # BLD AUTO: 5.45 10E12/L (ref 3.8–5.2)
WBC # BLD AUTO: 8 10E9/L (ref 4–11)

## 2017-04-18 PROCEDURE — 85027 COMPLETE CBC AUTOMATED: CPT | Performed by: PHYSICIAN ASSISTANT

## 2017-04-18 PROCEDURE — 82728 ASSAY OF FERRITIN: CPT | Performed by: PHYSICIAN ASSISTANT

## 2017-04-18 PROCEDURE — 36415 COLL VENOUS BLD VENIPUNCTURE: CPT | Performed by: PHYSICIAN ASSISTANT

## 2017-04-18 NOTE — TELEPHONE ENCOUNTER
Patient called. I see orders are in her chart. I did get her set up for an appt. Please disregard message.  Thank you,  Josefina Soni   for [facility]

## 2017-04-18 NOTE — PROGRESS NOTES
Paulette - your labs are looking much improved surrounding iron deficiency.  You may consider just taking a prenatal vitamin or continuing to increase iron in your diet. You may stop any additional supplementation you may be doing.

## 2017-04-19 ENCOUNTER — ANESTHESIA (OUTPATIENT)
Dept: SURGERY | Facility: CLINIC | Age: 32
End: 2017-04-19
Payer: COMMERCIAL

## 2017-04-19 ENCOUNTER — SURGERY (OUTPATIENT)
Age: 32
End: 2017-04-19

## 2017-04-19 ENCOUNTER — APPOINTMENT (OUTPATIENT)
Dept: GENERAL RADIOLOGY | Facility: CLINIC | Age: 32
End: 2017-04-19
Attending: UROLOGY
Payer: COMMERCIAL

## 2017-04-19 ENCOUNTER — HOSPITAL ENCOUNTER (OUTPATIENT)
Facility: CLINIC | Age: 32
Discharge: HOME | End: 2017-04-19
Attending: UROLOGY | Admitting: UROLOGY
Payer: COMMERCIAL

## 2017-04-19 ENCOUNTER — ANESTHESIA EVENT (OUTPATIENT)
Dept: SURGERY | Facility: CLINIC | Age: 32
End: 2017-04-19
Payer: COMMERCIAL

## 2017-04-19 VITALS
WEIGHT: 181 LBS | SYSTOLIC BLOOD PRESSURE: 119 MMHG | TEMPERATURE: 97.4 F | OXYGEN SATURATION: 99 % | DIASTOLIC BLOOD PRESSURE: 66 MMHG | RESPIRATION RATE: 16 BRPM | HEIGHT: 67 IN | BODY MASS INDEX: 28.41 KG/M2

## 2017-04-19 DIAGNOSIS — Q62.5 DOUBLE URETER ON LEFT: Primary | ICD-10-CM

## 2017-04-19 DIAGNOSIS — Q62.63: ICD-10-CM

## 2017-04-19 LAB — HCG SERPL QL: NEGATIVE

## 2017-04-19 PROCEDURE — 37000009 ZZH ANESTHESIA TECHNICAL FEE, EACH ADDTL 15 MIN: Performed by: UROLOGY

## 2017-04-19 PROCEDURE — 71000012 ZZH RECOVERY PHASE 1 LEVEL 1 FIRST HR: Performed by: UROLOGY

## 2017-04-19 PROCEDURE — 25000125 ZZHC RX 250: Performed by: NURSE ANESTHETIST, CERTIFIED REGISTERED

## 2017-04-19 PROCEDURE — 25000128 H RX IP 250 OP 636: Performed by: NURSE ANESTHETIST, CERTIFIED REGISTERED

## 2017-04-19 PROCEDURE — 40000277 XR SURGERY CARM FLUORO LESS THAN 5 MIN W STILLS

## 2017-04-19 PROCEDURE — 37000008 ZZH ANESTHESIA TECHNICAL FEE, 1ST 30 MIN: Performed by: UROLOGY

## 2017-04-19 PROCEDURE — 71000027 ZZH RECOVERY PHASE 2 EACH 15 MINS: Performed by: UROLOGY

## 2017-04-19 PROCEDURE — C1769 GUIDE WIRE: HCPCS | Performed by: UROLOGY

## 2017-04-19 PROCEDURE — 25500064 ZZH RX 255 OP 636: Performed by: UROLOGY

## 2017-04-19 PROCEDURE — 36415 COLL VENOUS BLD VENIPUNCTURE: CPT | Performed by: ANESTHESIOLOGY

## 2017-04-19 PROCEDURE — C1758 CATHETER, URETERAL: HCPCS | Performed by: UROLOGY

## 2017-04-19 PROCEDURE — 27210794 ZZH OR GENERAL SUPPLY STERILE: Performed by: UROLOGY

## 2017-04-19 PROCEDURE — 36000052 ZZH SURGERY LEVEL 2 EA 15 ADDTL MIN: Performed by: UROLOGY

## 2017-04-19 PROCEDURE — 40000170 ZZH STATISTIC PRE-PROCEDURE ASSESSMENT II: Performed by: UROLOGY

## 2017-04-19 PROCEDURE — 25800025 ZZH RX 258: Performed by: UROLOGY

## 2017-04-19 PROCEDURE — 25800025 ZZH RX 258: Performed by: NURSE ANESTHETIST, CERTIFIED REGISTERED

## 2017-04-19 PROCEDURE — 27210995 ZZH RX 272: Performed by: UROLOGY

## 2017-04-19 PROCEDURE — S0077 INJECTION, CLINDAMYCIN PHOSP: HCPCS | Performed by: NURSE ANESTHETIST, CERTIFIED REGISTERED

## 2017-04-19 PROCEDURE — 25000566 ZZH SEVOFLURANE, EA 15 MIN: Performed by: UROLOGY

## 2017-04-19 PROCEDURE — 36000050 ZZH SURGERY LEVEL 2 1ST 30 MIN: Performed by: UROLOGY

## 2017-04-19 PROCEDURE — 84703 CHORIONIC GONADOTROPIN ASSAY: CPT | Performed by: ANESTHESIOLOGY

## 2017-04-19 PROCEDURE — 25000125 ZZHC RX 250: Performed by: UROLOGY

## 2017-04-19 RX ORDER — LIDOCAINE HYDROCHLORIDE 20 MG/ML
INJECTION, SOLUTION INFILTRATION; PERINEURAL PRN
Status: DISCONTINUED | OUTPATIENT
Start: 2017-04-19 | End: 2017-04-19

## 2017-04-19 RX ORDER — CLINDAMYCIN PHOSPHATE 900 MG/50ML
INJECTION, SOLUTION INTRAVENOUS PRN
Status: DISCONTINUED | OUTPATIENT
Start: 2017-04-19 | End: 2017-04-19

## 2017-04-19 RX ORDER — ONDANSETRON 4 MG/1
4 TABLET, ORALLY DISINTEGRATING ORAL EVERY 30 MIN PRN
Status: DISCONTINUED | OUTPATIENT
Start: 2017-04-19 | End: 2017-04-19 | Stop reason: HOSPADM

## 2017-04-19 RX ORDER — SODIUM CHLORIDE, SODIUM LACTATE, POTASSIUM CHLORIDE, CALCIUM CHLORIDE 600; 310; 30; 20 MG/100ML; MG/100ML; MG/100ML; MG/100ML
INJECTION, SOLUTION INTRAVENOUS CONTINUOUS
Status: DISCONTINUED | OUTPATIENT
Start: 2017-04-19 | End: 2017-04-19 | Stop reason: HOSPADM

## 2017-04-19 RX ORDER — ONDANSETRON 2 MG/ML
INJECTION INTRAMUSCULAR; INTRAVENOUS PRN
Status: DISCONTINUED | OUTPATIENT
Start: 2017-04-19 | End: 2017-04-19

## 2017-04-19 RX ORDER — SODIUM CHLORIDE, SODIUM LACTATE, POTASSIUM CHLORIDE, CALCIUM CHLORIDE 600; 310; 30; 20 MG/100ML; MG/100ML; MG/100ML; MG/100ML
INJECTION, SOLUTION INTRAVENOUS CONTINUOUS PRN
Status: DISCONTINUED | OUTPATIENT
Start: 2017-04-19 | End: 2017-04-19

## 2017-04-19 RX ORDER — OXYCODONE HYDROCHLORIDE 5 MG/1
5 TABLET ORAL ONCE
Status: DISCONTINUED | OUTPATIENT
Start: 2017-04-19 | End: 2017-04-19 | Stop reason: HOSPADM

## 2017-04-19 RX ORDER — FENTANYL CITRATE 50 UG/ML
25-50 INJECTION, SOLUTION INTRAMUSCULAR; INTRAVENOUS
Status: DISCONTINUED | OUTPATIENT
Start: 2017-04-19 | End: 2017-04-19 | Stop reason: HOSPADM

## 2017-04-19 RX ORDER — HYDROMORPHONE HYDROCHLORIDE 1 MG/ML
.3-.5 INJECTION, SOLUTION INTRAMUSCULAR; INTRAVENOUS; SUBCUTANEOUS EVERY 5 MIN PRN
Status: DISCONTINUED | OUTPATIENT
Start: 2017-04-19 | End: 2017-04-19 | Stop reason: HOSPADM

## 2017-04-19 RX ORDER — PROPOFOL 10 MG/ML
INJECTION, EMULSION INTRAVENOUS PRN
Status: DISCONTINUED | OUTPATIENT
Start: 2017-04-19 | End: 2017-04-19

## 2017-04-19 RX ORDER — CLINDAMYCIN PHOSPHATE 900 MG/50ML
900 INJECTION, SOLUTION INTRAVENOUS
Status: DISCONTINUED | OUTPATIENT
Start: 2017-04-19 | End: 2017-04-19 | Stop reason: HOSPADM

## 2017-04-19 RX ORDER — FENTANYL CITRATE 50 UG/ML
INJECTION, SOLUTION INTRAMUSCULAR; INTRAVENOUS PRN
Status: DISCONTINUED | OUTPATIENT
Start: 2017-04-19 | End: 2017-04-19

## 2017-04-19 RX ORDER — ONDANSETRON 2 MG/ML
4 INJECTION INTRAMUSCULAR; INTRAVENOUS EVERY 30 MIN PRN
Status: DISCONTINUED | OUTPATIENT
Start: 2017-04-19 | End: 2017-04-19 | Stop reason: HOSPADM

## 2017-04-19 RX ADMIN — WATER 15 ML GIVEN: 1 IRRIGANT IRRIGATION at 13:32

## 2017-04-19 RX ADMIN — CLINDAMYCIN PHOSPHATE 900 MG: 18 INJECTION, SOLUTION INTRAVENOUS at 11:17

## 2017-04-19 RX ADMIN — SODIUM CHLORIDE, POTASSIUM CHLORIDE, SODIUM LACTATE AND CALCIUM CHLORIDE: 600; 310; 30; 20 INJECTION, SOLUTION INTRAVENOUS at 11:07

## 2017-04-19 RX ADMIN — LIDOCAINE HYDROCHLORIDE 100 MG: 20 INJECTION, SOLUTION INFILTRATION; PERINEURAL at 11:12

## 2017-04-19 RX ADMIN — LIDOCAINE HYDROCHLORIDE 10 ML: 20 JELLY TOPICAL at 11:49

## 2017-04-19 RX ADMIN — WATER 1000 ML: 100 IRRIGANT IRRIGATION at 11:28

## 2017-04-19 RX ADMIN — ONDANSETRON 4 MG: 2 INJECTION INTRAMUSCULAR; INTRAVENOUS at 11:37

## 2017-04-19 RX ADMIN — PROPOFOL 200 MG: 10 INJECTION, EMULSION INTRAVENOUS at 11:12

## 2017-04-19 RX ADMIN — WATER 3000 ML: 100 INJECTION, SOLUTION INTRAVENOUS at 11:28

## 2017-04-19 RX ADMIN — FENTANYL CITRATE 100 MCG: 50 INJECTION, SOLUTION INTRAMUSCULAR; INTRAVENOUS at 11:12

## 2017-04-19 ASSESSMENT — LIFESTYLE VARIABLES: TOBACCO_USE: 0

## 2017-04-19 NOTE — BRIEF OP NOTE
[facility] Brief Operative Note    Pre-operative diagnosis: ECTOPIC UPPER LEFT POLE URETER    Post-operative diagnosis: Same   Procedure: Cystoscopy bilateral retrogrades, attempt left stent placement, left ureteroscopy and stent retrieval   Surgeon: Jeferson White MD, MD   Assistant(s): None   Anesthesia: LMA   Estimated blood loss: None   Total IV fluids: (See anesthesia record)   Blood transfusion: No transfusion was given during surgery   Total urine output: Not measured   Drains: None   Specimens: None   Implants: None   Findings: See dictation   Complications: None   Condition: Stable   Comments: See dictated operative report for full details.    Jeefrson White MD

## 2017-04-19 NOTE — ANESTHESIA POSTPROCEDURE EVALUATION
Patient: Paulette Singer    Procedure(s):  CYSTOSCOPY, LEFT URETEROSCOPY, LEFT STENT PLACEMENT & RETRIEVAL, BILATERAL RETROGRADES - Wound Class: II-Clean Contaminated   - Wound Class: II-Clean Contaminated    Diagnosis:ECTOPIC UPPER LEFT POLE URETER   Diagnosis Additional Information: No value filed.    Anesthesia Type:  General, LMA    Note:  Anesthesia Post Evaluation    Patient location during evaluation: PACU  Patient participation: Able to fully participate in evaluation  Level of consciousness: awake and alert  Pain management: adequate  Airway patency: patent  Cardiovascular status: acceptable  Respiratory status: acceptable  Hydration status: acceptable  PONV: none and controlled     Anesthetic complications: None          Last vitals:  Vitals:    04/19/17 1230 04/19/17 1252 04/19/17 1313   BP: 124/71 116/57 119/66   Resp: 16 13 16   Temp:  36.3  C (97.4  F)    SpO2: 98% 98% 99%         Electronically Signed By: Sabino Cardona MD  April 19, 2017  2:04 PM

## 2017-04-19 NOTE — IP AVS SNAPSHOT
[address], [address]    [facility] [address]    Phone:  [phone]                                       After Visit Summary   4/19/2017    Paulette Singer    MRN: [id number]           After Visit Summary Signature Page     I have received my discharge instructions, and my questions have been answered. I have discussed any challenges I see with this plan with the nurse or doctor.    ..........................................................................................................................................  Patient/Patient Representative Signature      ..........................................................................................................................................  Patient Representative Print Name and Relationship to Patient    ..................................................               ................................................  Date                                            Time    ..........................................................................................................................................  Reviewed by Signature/Title    ...................................................              ..............................................  Date                                                            Time

## 2017-04-19 NOTE — ANESTHESIA PREPROCEDURE EVALUATION
Procedure: Procedure(s):  COMBINED CYSTOSCOPY, RETROGRADES  Preop diagnosis: ECTOPIC UPPER LEFT POLE URETER     Allergies   Allergen Reactions     Amoxicillin Rash     Penicillins Rash     Past Medical History:   Diagnosis Date     Abnormal CAT scan 03/28/2017    bilateral duplicate renal     Headache 9/4/2007     Low ferritin 11/2016    3rd pregnancy     Migraines age 17    migraine/sinus headaches, better with excedrin     Plantar fasciitis 2/25/2009    See podiatry consult     Past Surgical History:   Procedure Laterality Date     HC UGI ENDOSCOPY, SIMPLE EXAM  09/11/2007     HERNIA REPAIR, INGUINAL RT/LT      bilateral as a baby     INSERT INTRAUTERINE DEVICE  6/2009    Mirena IUD under anesthetic     MOUTH SURGERY      [place] teeth     PELVIS LAPAROSCOPY,DX  6/2009    normal     Prior to Admission medications    Medication Sig Start Date End Date Taking? Authorizing Provider   ibuprofen (ADVIL/MOTRIN) 400 MG tablet Take 1-2 tablets (400-800 mg) by mouth every 6 hours as needed for other (cramping) 2/24/17   Rah Hill MD     Current Facility-Administered Medications Ordered in Epic   Medication Dose Route Frequency Last Rate Last Dose     clindamycin (CLEOCIN) infusion 900 mg  900 mg Intravenous Pre-Op/Pre-procedure x 1 dose         No current [facility]-ordered outpatient prescriptions on file.     Wt Readings from Last 1 Encounters:   04/19/17 82.1 kg (181 lb)     Temp Readings from Last 1 Encounters:   04/19/17 36.2  C (97.2  F) (Oral)     BP Readings from Last 6 Encounters:   04/19/17 115/81   04/13/17 120/70   03/17/17 133/75   03/07/17 140/80   02/26/17 117/74   02/25/17 137/78     Pulse Readings from Last 4 Encounters:   04/13/17 84   03/17/17 61   03/07/17 78   02/26/17 89     Resp Readings from Last 1 Encounters:   04/13/17 16     SpO2 Readings from Last 1 Encounters:   04/19/17 96%     Recent Labs   Lab Test  03/17/17   1820  11/01/15   2105   NA  142  139   POTASSIUM  4.1  3.8   CHLORIDE  107  109    CO2  26  26   ANIONGAP  9  4   GLC  90  95   BUN  26  14   CR  0.94  0.87   GAUTAM  8.6  8.3*     Recent Labs   Lab Test  04/18/17   1029  03/17/17   1820   WBC  8.0  8.9   HGB  14.3  13.7   PLT  236  297     Recent Labs   Lab Test 10/19/14   INR  0.66      RECENT LABS:   ECG:   ECHO:   CXR:    Anesthesia Evaluation     . Pt has had prior anesthetic.     No history of anesthetic complications          ROS/MED HX    ENT/Pulmonary:      (-) tobacco use and sleep apnea   Neurologic:       Cardiovascular:         METS/Exercise Tolerance:     Hematologic:         Musculoskeletal:         GI/Hepatic: Comment: Reflux only with pregnancy        Renal/Genitourinary: Comment: Bilateral ectopic ureters        Endo:         Psychiatric:         Infectious Disease:         Malignancy:         Other: Comment: Pt is breastfeeding.                      Physical Exam  Normal systems: cardiovascular, pulmonary and dental    Airway   Mallampati: I  Neck ROM: full    Dental     Cardiovascular       Pulmonary                     Anesthesia Plan      History & Physical Review  History and physical reviewed and following examination; no interval change.    ASA Status:  2 .    NPO Status:  > 8 hours    Plan for General and LMA with Intravenous induction. Maintenance will be Balanced.    PONV prophylaxis:  Ondansetron (or other 5HT-3)  Pt instructed to pump and discard after surgery one time.      Postoperative Care  Postoperative pain management:  IV analgesics.      Consents  Anesthetic plan, risks, benefits and alternatives discussed with:  Patient..                          .

## 2017-04-19 NOTE — ANESTHESIA CARE TRANSFER NOTE
Patient: Paulette Singer    Procedure(s):  CYSTOSCOPY, LEFT URETEROSCOPY, LEFT STENT PLACEMENT & RETRIEVAL, BILATERAL RETROGRADES - Wound Class: II-Clean Contaminated   - Wound Class: II-Clean Contaminated    Diagnosis: ECTOPIC UPPER LEFT POLE URETER   Diagnosis Additional Information: No value filed.    Anesthesia Type:   General, LMA     Note:  Airway :Face Mask  Patient transferred to:PACU  Comments: Resting quietly      Vitals: (Last set prior to Anesthesia Care Transfer)    CRNA VITALS  4/19/2017 1129 - 4/19/2017 1205      4/19/2017             Pulse: 61    SpO2: 100 %    Resp Rate (observed): 13    Resp Rate (set): 10                Electronically Signed By: JAMEY Ortiz CRNA  April 19, 2017  12:05 PM

## 2017-04-19 NOTE — IP AVS SNAPSHOT
MRN:[id number]                      After Visit Summary   4/19/2017    Paulette Singer    MRN: [id number]           Thank you!     Thank you for choosing [facility] for your care. Our goal is always to provide you with excellent care. Hearing back from our patients is one way we can continue to improve our services. Please take a few minutes to complete the written survey that you may receive in the mail after you visit with us. Thank you!        Patient Information     Date Of Birth          1985        About your hospital stay     You were admitted on:  April 19, 2017 You last received care in the:  [facility] PACU    You were discharged on:  April 19, 2017       Who to Call     For medical emergencies, please call 911.  For non-urgent questions about your medical care, please call your primary care provider or clinic, [phone]  For questions related to your surgery, please call your surgery clinic        Attending Provider     Provider Specialty    Jeferson White MD Urology       Primary Care Provider Office Phone # Fax #    Sona Borja PA-C [phone]       [address] [address]        After Care Instructions     Diet Instructions       Resume pre procedure diet            Discharge Instructions       Call [facility] to schedule voiding cystourethrogram.  [phone].  Will call with VCUG report and follow up plan.            Encourage fluids       Encourage fluids at home to keep urine clear to light pink                  Your next 10 appointments already scheduled     Apr 26, 2017  3:00 PM CDT   Office Visit with Sona Borja PA-C   [facility] ([facility])    [address] [place] [id number] [phone]           Bring a current list of meds and any records pertaining to this visit.  For Physicals, please bring immunization records and any forms  needing to be filled out.  Please arrive 10 minutes early to complete paperwork.              Future tests that were ordered for you     XR Cystogram Voiding                 Further instructions from your care team       Same Day Surgery Discharge Instructions for  Sedation and General Anesthesia       It's not unusual to feel dizzy, light-headed or faint for up to 24 hours after surgery or while taking pain medication.  If you have these symptoms: sit for a few minutes before standing and have someone assist you when you get up to walk or use the bathroom.      You should rest and relax for the next 24 hours. We recommend you make arrangements to have an adult stay with you for at least 24 hours after your discharge.  Avoid hazardous and strenuous activity.      DO NOT DRIVE any vehicle or operate mechanical equipment for 24 hours following the end of your surgery.  Even though you may feel normal, your reactions may be affected by the medication you have received.      Do not drink alcoholic beverages for 24 hours following surgery.       Slowly progress to your regular diet as you feel able. It's not unusual to feel nauseated and/or vomit after receiving anesthesia.  If you develop these symptoms, drink clear liquids (apple juice, ginger ale, broth, 7-up, etc. ) until you feel better.  If your nausea and vomiting persists for 24 hours, please notify your surgeon.        All narcotic pain medications, along with inactivity and anesthesia, can cause constipation. Drinking plenty of liquids and increasing fiber intake will help.      For any questions of a medical nature, call your surgeon.      Do not make important decisions for 24 hours.      If you had general anesthesia, you may have a sore throat for a couple of days related to the breathing tube used during surgery.  You may use Cepacol lozenges to help with this discomfort.  If it worsens or if you develop a fever, contact your surgeon.       If you feel  your pain is not well managed with the pain medications prescribed by your surgeon, please contact your surgeon's office to let them know so they can address your concerns.         Cystoscopy and Stent Placement Discharge Instructions    During surgery, a stent was placed in the ureter.  The ureter is the tube that drains urine from the kidney to the bladder.  The stent is placed to dilate (open) the ureter so the stone fragments can pass easily through the ureter or to decrease ureteral swelling after surgery, or to relieve an obstruction. The stent is made of rubber. The upper end of the stent curls in the kidney while the lower end rests in the bladder      Diet:    Return to the diet that you were on before the procedure, unless you are given specific diet instructions.    It is important to drink 6-8 glasses of fluids per day at home - at least 3-4 glasses should be water.    Activity:    Walk short distances and increase as your strength allows.    You may climb stairs.    Do not do strenuous exercise or heavy lifting until approved by surgeon.    Do not drive while taking narcotic pain medications.    Bathing:    You may take a shower.    While the stent is in place you may experience the following symptoms:    Blood and/or small blood clots in urine.    Bladder spasm (frequency and urgency of urination).    Discomfort or aching in the back or side where the stent is.    Burning or discomfort at the end of urine stream.    To decrease these symptoms you should:    Take pain medication as prescribed.    Drink plenty of fluids.    If you experience pain at the end of urination try not emptying your bladder completely.    If having discomfort in back or side, decrease activity.    Call your physician if these signs/symptoms are present:    Pain that is not relieved by a short rest or ordered pain medications.    Temperature at or above 101.0 F or chills.    Inability or difficulty urinating.     Excessive blood  "in urine.    Any questions or concerns.        Pending Results     Date and Time Order Name Status Description    2017 1159 XR Surgery MIREILLE Fluoro L/T 5 Min w Stills In process             Admission Information     Date & Time Provider Department Dept. Phone    2017 Jeferson White MD [facility] PACU [phone]      Your Vitals Were     Blood Pressure Temperature Respirations Height Weight Pulse Oximetry    117/73 96.6  F (35.9  C) (Temporal) 17 1.702 m (5' 7\") 82.1 kg (181 lb) 100%    BMI (Body Mass Index)                   28.35 kg/m2           MyChart Information     [email or web address] gives you secure access to your electronic health record. If you see a primary care provider, you can also send messages to your care team and make appointments. If you have questions, please call your primary care clinic.  If you do not have a primary care provider, please call [phone] and they will assist you.        Care EveryWhere ID     This is your Care EveryWhere ID. This could be used by other organizations to access your [place] medical records  [id number]           Review of your medicines      CONTINUE these medicines which have NOT CHANGED        Dose / Directions    ibuprofen 400 MG tablet   Commonly known as:  ADVIL/MOTRIN   Used for:   (spontaneous vaginal delivery)        Dose:  400-800 mg   Take 1-2 tablets (400-800 mg) by mouth every 6 hours as needed for other (cramping)   Quantity:  120 tablet   Refills:  1                Protect others around you: Learn how to safely use, store and throw away your medicines at www.disposemymeds.org.             Medication List: This is a list of all your medications and when to take them. Check marks below indicate your daily home schedule. Keep this list as a reference.      Medications           Morning Afternoon Evening Bedtime As Needed    ibuprofen 400 MG tablet   Commonly known as:  ADVIL/MOTRIN   Take 1-2 tablets (400-800 mg) by mouth every 6 " hours as needed for other (cramping)

## 2017-04-19 NOTE — DISCHARGE INSTRUCTIONS
Same Day Surgery Discharge Instructions for  Sedation and General Anesthesia       It's not unusual to feel dizzy, light-headed or faint for up to 24 hours after surgery or while taking pain medication.  If you have these symptoms: sit for a few minutes before standing and have someone assist you when you get up to walk or use the bathroom.      You should rest and relax for the next 24 hours. We recommend you make arrangements to have an adult stay with you for at least 24 hours after your discharge.  Avoid hazardous and strenuous activity.      DO NOT DRIVE any vehicle or operate mechanical equipment for 24 hours following the end of your surgery.  Even though you may feel normal, your reactions may be affected by the medication you have received.      Do not drink alcoholic beverages for 24 hours following surgery.       Slowly progress to your regular diet as you feel able. It's not unusual to feel nauseated and/or vomit after receiving anesthesia.  If you develop these symptoms, drink clear liquids (apple juice, ginger ale, broth, 7-up, etc. ) until you feel better.  If your nausea and vomiting persists for 24 hours, please notify your surgeon.        All narcotic pain medications, along with inactivity and anesthesia, can cause constipation. Drinking plenty of liquids and increasing fiber intake will help.      For any questions of a medical nature, call your surgeon.      Do not make important decisions for 24 hours.      If you had general anesthesia, you may have a sore throat for a couple of days related to the breathing tube used during surgery.  You may use Cepacol lozenges to help with this discomfort.  If it worsens or if you develop a fever, contact your surgeon.       If you feel your pain is not well managed with the pain medications prescribed by your surgeon, please contact your surgeon's office to let them know so they can address your concerns.         Cystoscopy and Stent Placement Discharge  Instructions    During surgery, a stent was placed in the ureter.  The ureter is the tube that drains urine from the kidney to the bladder.  The stent is placed to dilate (open) the ureter so the stone fragments can pass easily through the ureter or to decrease ureteral swelling after surgery, or to relieve an obstruction. The stent is made of rubber. The upper end of the stent curls in the kidney while the lower end rests in the bladder      Diet:    Return to the diet that you were on before the procedure, unless you are given specific diet instructions.    It is important to drink 6-8 glasses of fluids per day at home - at least 3-4 glasses should be water.    Activity:    Walk short distances and increase as your strength allows.    You may climb stairs.    Do not do strenuous exercise or heavy lifting until approved by surgeon.    Do not drive while taking narcotic pain medications.    Bathing:    You may take a shower.    While the stent is in place you may experience the following symptoms:    Blood and/or small blood clots in urine.    Bladder spasm (frequency and urgency of urination).    Discomfort or aching in the back or side where the stent is.    Burning or discomfort at the end of urine stream.    To decrease these symptoms you should:    Take pain medication as prescribed.    Drink plenty of fluids.    If you experience pain at the end of urination try not emptying your bladder completely.    If having discomfort in back or side, decrease activity.    Call your physician if these signs/symptoms are present:    Pain that is not relieved by a short rest or ordered pain medications.    Temperature at or above 101.0 F or chills.    Inability or difficulty urinating.     Excessive blood in urine.    Any questions or concerns.

## 2017-04-20 NOTE — OP NOTE
DATE OF PROCEDURE:  04/19/2017      PROCEDURE:  Cystoscopy, bilateral retrogrades,  attempted left stent placement, left ureteroscopy and stent retrieval.      PREOPERATIVE DIAGNOSES:     1.  Ectopic left upper pole ureter.   2.  Bilateral duplicated collecting systems.  See dictation for details.      POSTOPERATIVE DIAGNOSES:     1.  Ectopic left upper pole ureter.   2.  Bilateral duplicated collecting systems.  See dictation for details.      OPERATIVE NOTE:  Informed consent was obtained from Paulette Singer.  She was brought to the operating room, given laryngeal mask anesthesia, placed in lithotomy position and sterile prep and drape were applied and surgical timeout was taken.  Cystoscope was introduced into the bladder and inspection was performed.  This revealed somewhat laterally placed ureteral orifices in the trigone.  The right side was cannulated with a 6 Nepali open-ended ureteral catheter and contrast was injected.  This showed a single distal ureter.  The upper and lower pole ureters join at approximately the mid lumbar level and there was no hydronephrosis.  This side drained readily.  On the left side, the ureteral orifice, cannulated this is the lower pole ureteral orifice and contrast was injected showing a normal-appearing ureter and no dilation.  Just at the bladder neck the ectopic ureter was found.  A Sensor guidewire was advanced up this a short distance and the open-ended ureteral catheter was advanced, guidewire was removed.  Contrast was injected and demonstrated a dilation of the entire ureter as well as the small upper pole renal pelvis and caliceal system.  I felt that attempt at stenting since she is having significant discomfort on that side would be prudent.  I replaced the Sensor guidewire up to the upper pole renal pelvis and removed the ureteral catheter, leaving the guidewire in place.  A 6 Nepali x 28 cm stent was advanced over the guidewire under fluoroscopic and visual  control.  Once the guidewire was removed, the upper end coiled nicely in the renal pelvis; however, the distal end was 5-6 cm up from the ureteral orifices in the ureter.  She switched to a short rigid ureteroscope was able to advance this up the ectopic ureter.  It did not appear to be any obstruction and allowed for easy passage of the scope.  A 0 tip nitinol basket was used to engage the stent and removed from the patient.  I checked to see if there any longer stents, the longest one we had was 30 cm which would clearly not be long enough to traverse the entire ureter up to the upper pole so since it did not appear to be obstructed on the ureteroscopy decided not to pursue stenting in this case.  Since it does not appear to be obstructed I wonder if she has  got reflex up this ectopic ureter.  She will be discharged home from the recovery room.  No antibiotics needed.  An order was placed for a VCUG at [facility].  She should call Radiology Department there to schedule the procedure and will contact her once the results are back for further management.  If she indeed does have reflux, could consider doing a ureteral reimplantation as definitive therapy.  If there is no reflux then either pyelopyelostomy or ureteral pyelostomy or ureteroureterostomy could be performed up higher as a definitive treatment.  She tolerated the procedure well, there were no complications, no blood loss.         JEFERSON CALLAHAN MD             D: 2017 12:05   T: 2017 20:53   MT: [id number]      Name:     DANIEL GOLDMAN   MRN:      [id number]        Account:        [id number]   :      1985           Procedure Date: 2017      Document: [id number]       cc: Jeferson Callahan MD

## 2017-04-24 ENCOUNTER — HOSPITAL ENCOUNTER (OUTPATIENT)
Dept: GENERAL RADIOLOGY | Facility: CLINIC | Age: 32
Discharge: HOME | End: 2017-04-24
Attending: UROLOGY | Admitting: UROLOGY
Payer: COMMERCIAL

## 2017-04-24 DIAGNOSIS — Q62.63: ICD-10-CM

## 2017-04-24 DIAGNOSIS — Q62.5 DOUBLE URETER ON LEFT: ICD-10-CM

## 2017-04-24 PROCEDURE — 74455 X-RAY URETHRA/BLADDER: CPT | Mod: TC

## 2017-04-24 PROCEDURE — 25500064 ZZH RX 255 OP 636: Performed by: RADIOLOGY

## 2017-04-24 RX ADMIN — DIATRIZOATE MEGLUMINE 600 ML: 300 INJECTION, SOLUTION INTRAVENOUS at 10:12

## 2017-04-26 ENCOUNTER — MYC MEDICAL ADVICE (OUTPATIENT)
Dept: FAMILY MEDICINE | Facility: CLINIC | Age: 32
End: 2017-04-26

## 2017-04-28 ENCOUNTER — TELEPHONE (OUTPATIENT)
Dept: FAMILY MEDICINE | Facility: CLINIC | Age: 32
End: 2017-04-28

## 2017-04-28 NOTE — TELEPHONE ENCOUNTER
Reason for Call:  cystomgram records     Detailed comments: patient is calling because Dr White's office has called her and they need her Cystogram records from Monday 4/24/17 sent to them immediately and to notify us they are closing early today.  Patient states this is urgent.    Phone Number Patient can be reached at: Other number:  Dr White's office fax #: [phone] and please send it in attn to Lori the triage nurse    Best Time: asap    Can we leave a detailed message on this number? Not Applicable    Call taken on 4/28/2017 at 12:21 PM by Denisha Keane

## 2017-05-01 ENCOUNTER — MYC MEDICAL ADVICE (OUTPATIENT)
Dept: FAMILY MEDICINE | Facility: CLINIC | Age: 32
End: 2017-05-01

## 2017-05-22 ENCOUNTER — MYC MEDICAL ADVICE (OUTPATIENT)
Dept: FAMILY MEDICINE | Facility: CLINIC | Age: 32
End: 2017-05-22

## 2017-06-05 ENCOUNTER — OFFICE VISIT (OUTPATIENT)
Dept: FAMILY MEDICINE | Facility: CLINIC | Age: 32
End: 2017-06-05
Payer: COMMERCIAL

## 2017-06-05 VITALS
BODY MASS INDEX: 28.51 KG/M2 | DIASTOLIC BLOOD PRESSURE: 70 MMHG | HEART RATE: 88 BPM | SYSTOLIC BLOOD PRESSURE: 120 MMHG | TEMPERATURE: 97.5 F | RESPIRATION RATE: 16 BRPM | WEIGHT: 182 LBS | OXYGEN SATURATION: 98 %

## 2017-06-05 DIAGNOSIS — N39.0 RECURRENT URINARY TRACT INFECTION: ICD-10-CM

## 2017-06-05 DIAGNOSIS — Z01.818 PREOP GENERAL PHYSICAL EXAM: Primary | ICD-10-CM

## 2017-06-05 DIAGNOSIS — Q62.5 DOUBLE URETER ON LEFT: ICD-10-CM

## 2017-06-05 PROCEDURE — 99214 OFFICE O/P EST MOD 30 MIN: CPT | Performed by: FAMILY MEDICINE

## 2017-06-05 NOTE — PROGRESS NOTES
[address] [address]  [phone]  Dept: [phone]    PRE-OP EVALUATION:  Today's date: 2017    Paulette Singer (: 1985) presents for pre-operative evaluation assessment as requested by Dr. White.  She requires evaluation and anesthesia risk assessment prior to undergoing surgery/procedure for treatment of extra ureters .  Proposed procedure: Cystoscopy with submucosal injection of deflux    Date of Surgery/ Procedure: 17  Time of Surgery/ Procedure: 12:00pm  Hospital/Surgical Facility: [facility]    Primary Physician: Sona Borja  Type of Anesthesia Anticipated: General    Patient has a Health Care Directive or Living Will:  NO    Preop Questions 6/3/2017   1.  Do you have a history of heart attack, stroke, stent, bypass or surgery on an artery in the head, neck, heart or legs? No   2.  Do you ever have any pain or discomfort in your chest? No   3.  Do you have a history of  Heart Failure? No   4.   Are you troubled by shortness of breath when:  walking on a level surface, or up a slight hill, or at night? No   5.  Do you currently have a cold, bronchitis or other respiratory infection? No   6.  Do you have a cough, shortness of breath, or wheezing? No   7.  Do you sometimes get pains in the calves of your legs when you walk? No   8. Do you or anyone in your family have previous history of blood clots? No   9.  Do you or does anyone in your family have a serious bleeding problem such as prolonged bleeding following surgeries or cuts? No   10. Have you ever had problems with anemia or been told to take iron pills? No   11. Have you had any abnormal blood loss such as black, tarry or bloody stools, or abnormal vaginal bleeding? No   12. Have you ever had a blood transfusion? No   13. Have you or any of your relatives ever had problems with anesthesia? No   14. Do you have sleep apnea, excessive snoring or daytime drowsiness? No   15.  Do you have any prosthetic heart valves? No   16. Do you have prosthetic joints? No   17. Is there any chance that you may be pregnant? 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/04/2015     Priority: Medium     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     Priority: Medium     CARDIOVASCULAR SCREENING; LDL GOAL LESS THAN 160 10/31/2010      Past Medical History:   Diagnosis Date     Abnormal CAT scan 03/28/2017    bilateral duplicate renal     Headache 9/4/2007     Low ferritin 11/2016    3rd pregnancy     Migraines age 17    migraine/sinus headaches, better with excedrin     Plantar fasciitis 2/25/2009    See podiatry consult     Past Surgical History:   Procedure Laterality Date     CYSTOSCOPY, RETROGRADES, COMBINED Bilateral 4/19/2017    Procedure: COMBINED CYSTOSCOPY, RETROGRADES;  CYSTOSCOPY, LEFT URETEROSCOPY, LEFT STENT PLACEMENT & RETRIEVAL, BILATERAL RETROGRADES;  Surgeon: Jeferson White MD;  Location:  OR     CYSTOSCOPY, URETEROSCOPY, COMBINED Left 4/19/2017    Procedure: COMBINED CYSTOSCOPY, URETEROSCOPY;;  Surgeon: Jeferson White MD;  Location:  OR      UGI ENDOSCOPY, SIMPLE EXAM  09/11/2007     HERNIA REPAIR, INGUINAL RT/LT      bilateral as a baby     INSERT INTRAUTERINE DEVICE  6/2009    Mirena IUD under anesthetic     MOUTH SURGERY      [place] teeth     PELVIS LAPAROSCOPY,DX  6/2009    normal     Current Outpatient Prescriptions   Medication Sig Dispense Refill      ibuprofen (ADVIL/MOTRIN) 400 MG tablet Take 1-2 tablets (400-800 mg) by mouth every 6 hours as needed for other (cramping) 120 tablet 1     OTC products: None, except as noted above    Allergies   Allergen Reactions     Amoxicillin Rash     Penicillins Rash      Latex Allergy: NO    Social History   Substance Use Topics     Smoking status: Never Smoker     Smokeless tobacco: Never Used     Alcohol use 0.0 oz/week     0 Standard drinks or equivalent per week      Comment: wine on occassion but not after12/6/11     History   Drug Use No       REVIEW OF SYSTEMS:                                                    C: NEGATIVE for fever, chills, change in weight  I: NEGATIVE for worrisome rashes, moles or lesions  E: NEGATIVE for vision changes or irritation  E/M: NEGATIVE for ear, mouth and throat problems  R: NEGATIVE for significant cough or SOB  B: NEGATIVE for masses, tenderness or discharge  CV: NEGATIVE for chest pain, palpitations or peripheral edema  GI: NEGATIVE for nausea, abdominal pain, heartburn, or change in bowel habits  : NEGATIVE for frequency, dysuria, or hematuria  MUSCULOSKELETAL:back pain  N: NEGATIVE for weakness, dizziness or paresthesias  E: NEGATIVE for temperature intolerance, skin/hair changes  H: NEGATIVE for bleeding problems  P: NEGATIVE for changes in mood or affect    EXAM:                                                    /70  Pulse 88  Temp 97.5  F (36.4  C) (Temporal)  Resp 16  Wt 182 lb (82.6 kg)  SpO2 98%  BMI 28.51 kg/m2    GENERAL APPEARANCE: healthy, alert and no distress     EYES: EOMI, PERRL     HENT: ear canals and TM's normal and nose and mouth without ulcers or lesions     NECK: no adenopathy, no asymmetry, masses, or scars and thyroid normal to palpation     RESP: lungs clear to auscultation - no rales, rhonchi or wheezes     CV: regular rates and rhythm, normal S1 S2, no S3 or S4 and no murmur, click or rub     ABDOMEN:  soft, nontender, no HSM or masses and  bowel sounds normal     MS: extremities normal- no gross deformities noted, no evidence of inflammation in joints, FROM in all extremities.     SKIN: no suspicious lesions or rashes     NEURO: Normal strength and tone, sensory exam grossly normal, mentation intact and speech normal     PSYCH: mentation appears normal. and affect normal/bright     LYMPHATICS: No axillary, cervical, or supraclavicular nodes    DIAGNOSTICS:                                                    EKG: Not indicated due to non-vascular surgery and low risk of event (age <65 and without cardiac risk factors)    Recent Labs   Lab Test  04/18/17   1029  03/17/17   1820   11/01/15   2105 10/19/14   HGB  14.3  13.7   < >  14.5   --    PLT  236  297   < >  207   --    INR   --    --    --    --   0.66   NA   --   142   --   139   --    POTASSIUM   --   4.1   --   3.8   --    CR   --   0.94   --   0.87   --     < > = values in this interval not displayed.        IMPRESSION:                                                    Reason for surgery/procedure: Recurrent urinary tract infections with double ureter on the left     The proposed surgical procedure is considered INTERMEDIATE risk.    REVISED CARDIAC RISK INDEX  The patient has the following serious cardiovascular risks for perioperative complications such as (MI, PE, VFib and 3  AV Block):  No serious cardiac risks  INTERPRETATION: 0 risks: Class I (very low risk - 0.4% complication rate)    The patient has the following additional risks for perioperative complications:  No identified additional risks    No diagnosis found.    RECOMMENDATIONS:                                                              APPROVAL GIVEN to proceed with proposed procedure, without further diagnostic evaluation       Signed Electronically by: Tejas Paulino MD    Copy of this evaluation report is provided to requesting physician.    [facility] Preop Guidelines

## 2017-06-05 NOTE — MR AVS SNAPSHOT
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 This includes signs of a cold or flu (such as a sore throat, runny nose, cough, rash or fever).    Do not smoke, drink alcohol or take over the counter medicine (unless your surgeon or primary care doctor tells you to) for the 24 hours before and after surgery.    If you take prescribed drugs: Follow your doctor s orders about which medicines to take and which to stop until after surgery.    Eating and drinking prior to surgery: follow the instructions from your surgeon    Take a shower or bath the night before surgery. Use the soap your surgeon gave you to gently clean your skin. If you do not have soap from your surgeon, use your regular soap. Do not shave or scrub the surgery site.  Wear clean pajamas and have clean sheets on your bed.           Follow-ups after your visit        Your next 10 appointments already scheduled     Jun 07, 2017   Procedure with Jeferson White MD   [facility] (--)    [address]  [facility] [id number] [phone]              Who to contact     If you have questions or need follow up information about today's clinic visit or your schedule please contact [facility] directly at [phone].  Normal or non-critical lab and imaging results will be communicated to you by MyChart, letter or phone within 4 business days after the clinic has received the results. If you do not hear from us within 7 days, please contact  the clinic through [employer] or phone. If you have a critical or abnormal lab result, we will notify you by phone as soon as possible.  Submit refill requests through [employer] or call your pharmacy and they will forward the refill request to us. Please allow 3 business days for your refill to be completed.          Additional Information About Your Visit        [employer] Information     [employer] gives you secure access to your electronic health record. If you see a primary care provider, you can also send messages to your care team and make appointments. If you have questions, please call your primary care clinic.  If you do not have a primary care provider, please call [phone] and they will assist you.        Care EveryWhere ID     This is your Care EveryWhere ID. This could be used by other organizations to access your [facility] medical records  [id number]        Your Vitals Were     Pulse Temperature Respirations Pulse Oximetry BMI (Body Mass Index)       88 97.5  F (36.4  C) (Temporal) 16 98% 28.51 kg/m2        Blood Pressure from Last 3 Encounters:   06/05/17 120/70   04/19/17 119/66   04/13/17 120/70    Weight from Last 3 Encounters:   06/05/17 182 lb (82.6 kg)   04/19/17 181 lb (82.1 kg)   04/13/17 184 lb 12 oz (83.8 kg)              Today, you had the following     No orders found for display       Primary Care Provider Office Phone # Fax #    Sona Borja PA-C [phone]       [address] [place] [address]        Thank you!     Thank you for choosing [facility]  for your care. Our goal is always to provide you with excellent care. Hearing back from our patients is one way we can continue to improve our services. Please take a few minutes to complete the written survey that you may receive in the mail after your visit with us. Thank you!             Your Updated Medication List - Protect others around you: Learn how to safely use, store  and throw away your medicines at www.disposemymeds.org.          This list is accurate as of: 6/5/17 10:26 AM.  Always use your most recent med list.                   Brand Name Dispense Instructions for use    ibuprofen 400 MG tablet    ADVIL/MOTRIN    120 tablet    Take 1-2 tablets (400-800 mg) by mouth every 6 hours as needed for other (cramping)

## 2017-06-05 NOTE — NURSING NOTE
"Chief Complaint   Patient presents with     Pre-Op Exam       Initial /70  Pulse 88  Temp 97.5  F (36.4  C) (Temporal)  Resp 16  Wt 182 lb (82.6 kg)  SpO2 98%  BMI 28.51 kg/m2 Estimated body mass index is 28.51 kg/(m^2) as calculated from the following:    Height as of 4/19/17: 5' 7\" (1.702 m).    Weight as of this encounter: 182 lb (82.6 kg).  Medication Reconciliation: complete    "

## 2017-06-06 NOTE — H&P (VIEW-ONLY)
[address] [address]  [phone]  Dept: [phone]    PRE-OP EVALUATION:  Today's date: 2017    Paulette Singer (: 1985) presents for pre-operative evaluation assessment as requested by Dr. White.  She requires evaluation and anesthesia risk assessment prior to undergoing surgery/procedure for treatment of extra ureters .  Proposed procedure: Cystoscopy with submucosal injection of deflux    Date of Surgery/ Procedure: 17  Time of Surgery/ Procedure: 12:00pm  Hospital/Surgical Facility: [facility]    Primary Physician: Sona Borja  Type of Anesthesia Anticipated: General    Patient has a Health Care Directive or Living Will:  NO    Preop Questions 6/3/2017   1.  Do you have a history of heart attack, stroke, stent, bypass or surgery on an artery in the head, neck, heart or legs? No   2.  Do you ever have any pain or discomfort in your chest? No   3.  Do you have a history of  Heart Failure? No   4.   Are you troubled by shortness of breath when:  walking on a level surface, or up a slight hill, or at night? No   5.  Do you currently have a cold, bronchitis or other respiratory infection? No   6.  Do you have a cough, shortness of breath, or wheezing? No   7.  Do you sometimes get pains in the calves of your legs when you walk? No   8. Do you or anyone in your family have previous history of blood clots? No   9.  Do you or does anyone in your family have a serious bleeding problem such as prolonged bleeding following surgeries or cuts? No   10. Have you ever had problems with anemia or been told to take iron pills? No   11. Have you had any abnormal blood loss such as black, tarry or bloody stools, or abnormal vaginal bleeding? No   12. Have you ever had a blood transfusion? No   13. Have you or any of your relatives ever had problems with anesthesia? No   14. Do you have sleep apnea, excessive snoring or daytime drowsiness? No   15.  Do you have any prosthetic heart valves? No   16. Do you have prosthetic joints? No   17. Is there any chance that you may be pregnant? 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/04/2015     Priority: Medium     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     Priority: Medium     CARDIOVASCULAR SCREENING; LDL GOAL LESS THAN 160 10/31/2010      Past Medical History:   Diagnosis Date     Abnormal CAT scan 03/28/2017    bilateral duplicate renal     Headache 9/4/2007     Low ferritin 11/2016    3rd pregnancy     Migraines age 17    migraine/sinus headaches, better with excedrin     Plantar fasciitis 2/25/2009    See podiatry consult     Past Surgical History:   Procedure Laterality Date     CYSTOSCOPY, RETROGRADES, COMBINED Bilateral 4/19/2017    Procedure: COMBINED CYSTOSCOPY, RETROGRADES;  CYSTOSCOPY, LEFT URETEROSCOPY, LEFT STENT PLACEMENT & RETRIEVAL, BILATERAL RETROGRADES;  Surgeon: Jeferson White MD;  Location:  OR     CYSTOSCOPY, URETEROSCOPY, COMBINED Left 4/19/2017    Procedure: COMBINED CYSTOSCOPY, URETEROSCOPY;;  Surgeon: Jeferson White MD;  Location:  OR      UGI ENDOSCOPY, SIMPLE EXAM  09/11/2007     HERNIA REPAIR, INGUINAL RT/LT      bilateral as a baby     INSERT INTRAUTERINE DEVICE  6/2009    Mirena IUD under anesthetic     MOUTH SURGERY      [place]ee teeth     PELVIS LAPAROSCOPY,DX  6/2009    normal     Current Outpatient Prescriptions   Medication Sig Dispense Refill      ibuprofen (ADVIL/MOTRIN) 400 MG tablet Take 1-2 tablets (400-800 mg) by mouth every 6 hours as needed for other (cramping) 120 tablet 1     OTC products: None, except as noted above    Allergies   Allergen Reactions     Amoxicillin Rash     Penicillins Rash      Latex Allergy: NO    Social History   Substance Use Topics     Smoking status: Never Smoker     Smokeless tobacco: Never Used     Alcohol use 0.0 oz/week     0 Standard drinks or equivalent per week      Comment: wine on occassion but not after12/6/11     History   Drug Use No       REVIEW OF SYSTEMS:                                                    C: NEGATIVE for fever, chills, change in weight  I: NEGATIVE for worrisome rashes, moles or lesions  E: NEGATIVE for vision changes or irritation  E/M: NEGATIVE for ear, mouth and throat problems  R: NEGATIVE for significant cough or SOB  B: NEGATIVE for masses, tenderness or discharge  CV: NEGATIVE for chest pain, palpitations or peripheral edema  GI: NEGATIVE for nausea, abdominal pain, heartburn, or change in bowel habits  : NEGATIVE for frequency, dysuria, or hematuria  MUSCULOSKELETAL:back pain  N: NEGATIVE for weakness, dizziness or paresthesias  E: NEGATIVE for temperature intolerance, skin/hair changes  H: NEGATIVE for bleeding problems  P: NEGATIVE for changes in mood or affect    EXAM:                                                    /70  Pulse 88  Temp 97.5  F (36.4  C) (Temporal)  Resp 16  Wt 182 lb (82.6 kg)  SpO2 98%  BMI 28.51 kg/m2    GENERAL APPEARANCE: healthy, alert and no distress     EYES: EOMI, PERRL     HENT: ear canals and TM's normal and nose and mouth without ulcers or lesions     NECK: no adenopathy, no asymmetry, masses, or scars and thyroid normal to palpation     RESP: lungs clear to auscultation - no rales, rhonchi or wheezes     CV: regular rates and rhythm, normal S1 S2, no S3 or S4 and no murmur, click or rub     ABDOMEN:  soft, nontender, no HSM or masses and  bowel sounds normal     MS: extremities normal- no gross deformities noted, no evidence of inflammation in joints, FROM in all extremities.     SKIN: no suspicious lesions or rashes     NEURO: Normal strength and tone, sensory exam grossly normal, mentation intact and speech normal     PSYCH: mentation appears normal. and affect normal/bright     LYMPHATICS: No axillary, cervical, or supraclavicular nodes    DIAGNOSTICS:                                                    EKG: Not indicated due to non-vascular surgery and low risk of event (age <65 and without cardiac risk factors)    Recent Labs   Lab Test  04/18/17   1029  03/17/17   1820   11/01/15   2105 10/19/14   HGB  14.3  13.7   < >  14.5   --    PLT  236  297   < >  207   --    INR   --    --    --    --   0.66   NA   --   142   --   139   --    POTASSIUM   --   4.1   --   3.8   --    CR   --   0.94   --   0.87   --     < > = values in this interval not displayed.        IMPRESSION:                                                    Reason for surgery/procedure: Recurrent urinary tract infections with double ureter on the left     The proposed surgical procedure is considered INTERMEDIATE risk.    REVISED CARDIAC RISK INDEX  The patient has the following serious cardiovascular risks for perioperative complications such as (MI, PE, VFib and 3  AV Block):  No serious cardiac risks  INTERPRETATION: 0 risks: Class I (very low risk - 0.4% complication rate)    The patient has the following additional risks for perioperative complications:  No identified additional risks    No diagnosis found.    RECOMMENDATIONS:                                                              APPROVAL GIVEN to proceed with proposed procedure, without further diagnostic evaluation       Signed Electronically by: Tejas Paulino MD    Copy of this evaluation report is provided to requesting physician.    [facility] Preop Guidelines

## 2017-06-07 ENCOUNTER — ANESTHESIA (OUTPATIENT)
Dept: SURGERY | Facility: CLINIC | Age: 32
End: 2017-06-07
Payer: COMMERCIAL

## 2017-06-07 ENCOUNTER — ANESTHESIA EVENT (OUTPATIENT)
Dept: SURGERY | Facility: CLINIC | Age: 32
End: 2017-06-07
Payer: COMMERCIAL

## 2017-06-07 ENCOUNTER — SURGERY (OUTPATIENT)
Age: 32
End: 2017-06-07

## 2017-06-07 ENCOUNTER — HOSPITAL ENCOUNTER (OUTPATIENT)
Facility: CLINIC | Age: 32
Discharge: HOME | End: 2017-06-07
Attending: UROLOGY | Admitting: UROLOGY
Payer: COMMERCIAL

## 2017-06-07 VITALS
HEIGHT: 67 IN | BODY MASS INDEX: 27.78 KG/M2 | RESPIRATION RATE: 20 BRPM | HEART RATE: 63 BPM | SYSTOLIC BLOOD PRESSURE: 127 MMHG | OXYGEN SATURATION: 96 % | DIASTOLIC BLOOD PRESSURE: 74 MMHG | TEMPERATURE: 99.1 F | WEIGHT: 177 LBS

## 2017-06-07 DIAGNOSIS — N13.70 UNILATERAL URETERAL REFLUX: Primary | ICD-10-CM

## 2017-06-07 LAB — HCG SERPL QL: NEGATIVE

## 2017-06-07 PROCEDURE — C1769 GUIDE WIRE: HCPCS | Performed by: UROLOGY

## 2017-06-07 PROCEDURE — 71000013 ZZH RECOVERY PHASE 1 LEVEL 1 EA ADDTL HR: Performed by: UROLOGY

## 2017-06-07 PROCEDURE — 25000125 ZZHC RX 250: Performed by: NURSE ANESTHETIST, CERTIFIED REGISTERED

## 2017-06-07 PROCEDURE — 27210995 ZZH RX 272: Performed by: UROLOGY

## 2017-06-07 PROCEDURE — 84703 CHORIONIC GONADOTROPIN ASSAY: CPT | Performed by: UROLOGY

## 2017-06-07 PROCEDURE — 25000566 ZZH SEVOFLURANE, EA 15 MIN: Performed by: UROLOGY

## 2017-06-07 PROCEDURE — 71000027 ZZH RECOVERY PHASE 2 EACH 15 MINS: Performed by: UROLOGY

## 2017-06-07 PROCEDURE — 71000012 ZZH RECOVERY PHASE 1 LEVEL 1 FIRST HR: Performed by: UROLOGY

## 2017-06-07 PROCEDURE — C1758 CATHETER, URETERAL: HCPCS | Performed by: UROLOGY

## 2017-06-07 PROCEDURE — 36000052 ZZH SURGERY LEVEL 2 EA 15 ADDTL MIN: Performed by: UROLOGY

## 2017-06-07 PROCEDURE — 25000125 ZZHC RX 250: Performed by: UROLOGY

## 2017-06-07 PROCEDURE — 36000050 ZZH SURGERY LEVEL 2 1ST 30 MIN: Performed by: UROLOGY

## 2017-06-07 PROCEDURE — 40000170 ZZH STATISTIC PRE-PROCEDURE ASSESSMENT II: Performed by: UROLOGY

## 2017-06-07 PROCEDURE — 25000128 H RX IP 250 OP 636: Performed by: NURSE ANESTHETIST, CERTIFIED REGISTERED

## 2017-06-07 PROCEDURE — 27210794 ZZH OR GENERAL SUPPLY STERILE: Performed by: UROLOGY

## 2017-06-07 PROCEDURE — S0077 INJECTION, CLINDAMYCIN PHOSP: HCPCS | Performed by: UROLOGY

## 2017-06-07 PROCEDURE — 25800025 ZZH RX 258: Performed by: UROLOGY

## 2017-06-07 PROCEDURE — 36415 COLL VENOUS BLD VENIPUNCTURE: CPT | Performed by: UROLOGY

## 2017-06-07 PROCEDURE — 37000008 ZZH ANESTHESIA TECHNICAL FEE, 1ST 30 MIN: Performed by: UROLOGY

## 2017-06-07 PROCEDURE — L8604 DEXTRANOMER/HYALURONIC ACID: HCPCS | Performed by: UROLOGY

## 2017-06-07 PROCEDURE — 37000009 ZZH ANESTHESIA TECHNICAL FEE, EACH ADDTL 15 MIN: Performed by: UROLOGY

## 2017-06-07 DEVICE — RX DEFLUX 1ML SYRINGE GEL 10-92203: Type: IMPLANTABLE DEVICE | Site: BLADDER | Status: FUNCTIONAL

## 2017-06-07 RX ORDER — SODIUM CHLORIDE, SODIUM LACTATE, POTASSIUM CHLORIDE, CALCIUM CHLORIDE 600; 310; 30; 20 MG/100ML; MG/100ML; MG/100ML; MG/100ML
INJECTION, SOLUTION INTRAVENOUS CONTINUOUS
Status: DISCONTINUED | OUTPATIENT
Start: 2017-06-07 | End: 2017-06-07 | Stop reason: HOSPADM

## 2017-06-07 RX ORDER — HYDROMORPHONE HYDROCHLORIDE 1 MG/ML
.3-.5 INJECTION, SOLUTION INTRAMUSCULAR; INTRAVENOUS; SUBCUTANEOUS EVERY 5 MIN PRN
Status: DISCONTINUED | OUTPATIENT
Start: 2017-06-07 | End: 2017-06-07 | Stop reason: HOSPADM

## 2017-06-07 RX ORDER — ONDANSETRON 4 MG/1
4 TABLET, ORALLY DISINTEGRATING ORAL EVERY 30 MIN PRN
Status: DISCONTINUED | OUTPATIENT
Start: 2017-06-07 | End: 2017-06-07 | Stop reason: HOSPADM

## 2017-06-07 RX ORDER — FENTANYL CITRATE 50 UG/ML
INJECTION, SOLUTION INTRAMUSCULAR; INTRAVENOUS PRN
Status: DISCONTINUED | OUTPATIENT
Start: 2017-06-07 | End: 2017-06-07

## 2017-06-07 RX ORDER — OXYCODONE HYDROCHLORIDE 5 MG/1
5 TABLET ORAL ONCE
Status: DISCONTINUED | OUTPATIENT
Start: 2017-06-07 | End: 2017-06-07 | Stop reason: HOSPADM

## 2017-06-07 RX ORDER — PROPOFOL 10 MG/ML
INJECTION, EMULSION INTRAVENOUS CONTINUOUS PRN
Status: DISCONTINUED | OUTPATIENT
Start: 2017-06-07 | End: 2017-06-07

## 2017-06-07 RX ORDER — ALBUTEROL SULFATE 0.83 MG/ML
2.5 SOLUTION RESPIRATORY (INHALATION) EVERY 4 HOURS PRN
Status: DISCONTINUED | OUTPATIENT
Start: 2017-06-07 | End: 2017-06-07 | Stop reason: HOSPADM

## 2017-06-07 RX ORDER — EPHEDRINE SULFATE 50 MG/ML
INJECTION, SOLUTION INTRAMUSCULAR; INTRAVENOUS; SUBCUTANEOUS PRN
Status: DISCONTINUED | OUTPATIENT
Start: 2017-06-07 | End: 2017-06-07

## 2017-06-07 RX ORDER — ONDANSETRON 2 MG/ML
4 INJECTION INTRAMUSCULAR; INTRAVENOUS EVERY 30 MIN PRN
Status: DISCONTINUED | OUTPATIENT
Start: 2017-06-07 | End: 2017-06-07 | Stop reason: HOSPADM

## 2017-06-07 RX ORDER — HYDRALAZINE HYDROCHLORIDE 20 MG/ML
2.5-5 INJECTION INTRAMUSCULAR; INTRAVENOUS EVERY 10 MIN PRN
Status: DISCONTINUED | OUTPATIENT
Start: 2017-06-07 | End: 2017-06-07 | Stop reason: HOSPADM

## 2017-06-07 RX ORDER — CLINDAMYCIN PHOSPHATE 900 MG/50ML
900 INJECTION, SOLUTION INTRAVENOUS
Status: COMPLETED | OUTPATIENT
Start: 2017-06-07 | End: 2017-06-07

## 2017-06-07 RX ORDER — GLYCOPYRROLATE 0.2 MG/ML
INJECTION, SOLUTION INTRAMUSCULAR; INTRAVENOUS PRN
Status: DISCONTINUED | OUTPATIENT
Start: 2017-06-07 | End: 2017-06-07

## 2017-06-07 RX ORDER — KETOROLAC TROMETHAMINE 30 MG/ML
30 INJECTION, SOLUTION INTRAMUSCULAR; INTRAVENOUS
Status: DISCONTINUED | OUTPATIENT
Start: 2017-06-07 | End: 2017-06-07 | Stop reason: HOSPADM

## 2017-06-07 RX ORDER — ONDANSETRON 2 MG/ML
INJECTION INTRAMUSCULAR; INTRAVENOUS PRN
Status: DISCONTINUED | OUTPATIENT
Start: 2017-06-07 | End: 2017-06-07

## 2017-06-07 RX ORDER — LABETALOL HYDROCHLORIDE 5 MG/ML
10 INJECTION, SOLUTION INTRAVENOUS
Status: DISCONTINUED | OUTPATIENT
Start: 2017-06-07 | End: 2017-06-07 | Stop reason: HOSPADM

## 2017-06-07 RX ORDER — LIDOCAINE HYDROCHLORIDE 20 MG/ML
INJECTION, SOLUTION INFILTRATION; PERINEURAL PRN
Status: DISCONTINUED | OUTPATIENT
Start: 2017-06-07 | End: 2017-06-07

## 2017-06-07 RX ORDER — SODIUM CHLORIDE, SODIUM LACTATE, POTASSIUM CHLORIDE, CALCIUM CHLORIDE 600; 310; 30; 20 MG/100ML; MG/100ML; MG/100ML; MG/100ML
INJECTION, SOLUTION INTRAVENOUS CONTINUOUS PRN
Status: DISCONTINUED | OUTPATIENT
Start: 2017-06-07 | End: 2017-06-07

## 2017-06-07 RX ORDER — FENTANYL CITRATE 50 UG/ML
25-50 INJECTION, SOLUTION INTRAMUSCULAR; INTRAVENOUS
Status: DISCONTINUED | OUTPATIENT
Start: 2017-06-07 | End: 2017-06-07 | Stop reason: HOSPADM

## 2017-06-07 RX ORDER — DEXAMETHASONE SODIUM PHOSPHATE 4 MG/ML
INJECTION, SOLUTION INTRA-ARTICULAR; INTRALESIONAL; INTRAMUSCULAR; INTRAVENOUS; SOFT TISSUE PRN
Status: DISCONTINUED | OUTPATIENT
Start: 2017-06-07 | End: 2017-06-07

## 2017-06-07 RX ORDER — CLINDAMYCIN HCL 300 MG
300 CAPSULE ORAL 3 TIMES DAILY
Qty: 12 CAPSULE | Refills: 0 | Status: SHIPPED | OUTPATIENT
Start: 2017-06-07 | End: 2017-06-11

## 2017-06-07 RX ORDER — PROPOFOL 10 MG/ML
INJECTION, EMULSION INTRAVENOUS PRN
Status: DISCONTINUED | OUTPATIENT
Start: 2017-06-07 | End: 2017-06-07

## 2017-06-07 RX ADMIN — Medication 10 MG: at 13:08

## 2017-06-07 RX ADMIN — FENTANYL CITRATE 100 MCG: 50 INJECTION, SOLUTION INTRAMUSCULAR; INTRAVENOUS at 12:55

## 2017-06-07 RX ADMIN — DEXAMETHASONE SODIUM PHOSPHATE 4 MG: 4 INJECTION, SOLUTION INTRA-ARTICULAR; INTRALESIONAL; INTRAMUSCULAR; INTRAVENOUS; SOFT TISSUE at 13:02

## 2017-06-07 RX ADMIN — SODIUM CHLORIDE, POTASSIUM CHLORIDE, SODIUM LACTATE AND CALCIUM CHLORIDE: 600; 310; 30; 20 INJECTION, SOLUTION INTRAVENOUS at 12:53

## 2017-06-07 RX ADMIN — PROPOFOL 50 MCG/KG/MIN: 10 INJECTION, EMULSION INTRAVENOUS at 12:56

## 2017-06-07 RX ADMIN — CLINDAMYCIN PHOSPHATE 900 MG: 18 INJECTION, SOLUTION INTRAVENOUS at 13:01

## 2017-06-07 RX ADMIN — ONDANSETRON 4 MG: 2 INJECTION INTRAMUSCULAR; INTRAVENOUS at 13:29

## 2017-06-07 RX ADMIN — WATER 3000 ML: 100 INJECTION, SOLUTION INTRAVENOUS at 13:11

## 2017-06-07 RX ADMIN — GLYCOPYRROLATE 0.2 MG: 0.2 INJECTION, SOLUTION INTRAMUSCULAR; INTRAVENOUS at 13:11

## 2017-06-07 RX ADMIN — Medication 10 MG: at 13:06

## 2017-06-07 RX ADMIN — PROPOFOL 200 MG: 10 INJECTION, EMULSION INTRAVENOUS at 12:56

## 2017-06-07 RX ADMIN — DEXMEDETOMIDINE HYDROCHLORIDE 8 MCG: 100 INJECTION, SOLUTION INTRAVENOUS at 12:55

## 2017-06-07 RX ADMIN — LIDOCAINE HYDROCHLORIDE 60 MG: 20 INJECTION, SOLUTION INFILTRATION; PERINEURAL at 12:56

## 2017-06-07 RX ADMIN — Medication 5 MG: at 13:02

## 2017-06-07 RX ADMIN — WATER 1000 ML: 100 IRRIGANT IRRIGATION at 13:11

## 2017-06-07 RX ADMIN — LIDOCAINE HYDROCHLORIDE 10 ML: 20 JELLY TOPICAL at 13:29

## 2017-06-07 ASSESSMENT — COPD QUESTIONNAIRES: COPD: 0

## 2017-06-07 ASSESSMENT — ENCOUNTER SYMPTOMS
DYSRHYTHMIAS: 0
SEIZURES: 0

## 2017-06-07 ASSESSMENT — LIFESTYLE VARIABLES: TOBACCO_USE: 0

## 2017-06-07 NOTE — ANESTHESIA POSTPROCEDURE EVALUATION
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    Anesthesia Type:  General, LMA    Note:  Anesthesia Post Evaluation    Patient location during evaluation: PACU  Patient participation: Able to fully participate in evaluation  Level of consciousness: awake and alert  Pain management: adequate  Airway patency: patent  Cardiovascular status: acceptable, hemodynamically stable and blood pressure returned to baseline  Respiratory status: acceptable  Hydration status: acceptable     Anesthetic complications: None          Last vitals:  Vitals:    06/07/17 1415 06/07/17 1430 06/07/17 1445   BP: 123/66 123/65 127/74   Pulse:      Resp: 13 16 20   Temp:  37.3  C (99.1  F)    SpO2: 95% 96% 96%         Electronically Signed By: Sammi Mills MD  June 7, 2017  4:23 PM

## 2017-06-07 NOTE — OR NURSING
Called in a prescription for Macrobid per Dr. White  request.  Call made to pharmacy [facility] in [place].  Also call to patient to let her know 1. That I called it in, and 2. Gave patient information about medication and breastfeeding.

## 2017-06-07 NOTE — BRIEF OP NOTE
[facility] Brief Operative Note    Pre-operative diagnosis: Left ectopic refluxing ureter upper pole    Post-operative diagnosis: Same   Procedure: Cystoscopy submucosal injection Deflux    Surgeon: Jeferson White MD, MD   Assistant(s): Dr Cornell assisted with procedure with needle positioning and injection    Anesthesia: LMA   Estimated blood loss: Less than 10 ml   Total IV fluids: (See anesthesia record)   Blood transfusion: No transfusion was given during surgery   Total urine output: Not measured   Drains: None   Specimens: None   Implants: None   Findings: See dictation   Complications: None   Condition: Stable   Comments: See dictated operative report for full details.    Jeferson White MD

## 2017-06-07 NOTE — IP AVS SNAPSHOT
MRN:[id number]                      After Visit Summary   6/7/2017    Paulette Singer    MRN: [id number]           Thank you!     Thank you for choosing [facility] for your care. Our goal is always to provide you with excellent care. Hearing back from our patients is one way we can continue to improve our services. Please take a few minutes to complete the written survey that you may receive in the mail after you visit with us. Thank you!        Patient Information     Date Of Birth          1985        About your hospital stay     You were admitted on:  June 7, 2017 You last received care in the:  [facility] PACU    You were discharged on:  June 7, 2017       Who to Call     For medical emergencies, please call 911.  For non-urgent questions about your medical care, please call your primary care provider or clinic, [phone]  For questions related to your surgery, please call your surgery clinic        Attending Provider     Provider Specialty    Jeferson White MD Urology       Primary Care Provider Office Phone # Fax #    Sona Borja PA-C [phone]      After Care Instructions     Diet Instructions       Resume pre procedure diet            Discharge Instructions       Patient to arrange for [facility] in [facility] in a month.            Encourage fluids       Encourage fluids at home to keep urine clear to light pink                  Future tests that were ordered for you     XR Cystogram Voiding                 Further instructions from your care team       Same Day Surgery Discharge Instructions for  Sedation and General Anesthesia       It's not unusual to feel dizzy, light-headed or faint for up to 24 hours after surgery or while taking pain medication.  If you have these symptoms: sit for a few minutes before standing and have someone assist you when you get up to walk or use the bathroom.      You should rest and relax for the next 24 hours.  We recommend you make arrangements to have an adult stay with you for at least 24 hours after your discharge.  Avoid hazardous and strenuous activity.      DO NOT DRIVE any vehicle or operate mechanical equipment for 24 hours following the end of your surgery.  Even though you may feel normal, your reactions may be affected by the medication you have received.      Do not drink alcoholic beverages for 24 hours following surgery.       Slowly progress to your regular diet as you feel able. It's not unusual to feel nauseated and/or vomit after receiving anesthesia.  If you develop these symptoms, drink clear liquids (apple juice, ginger ale, broth, 7-up, etc. ) until you feel better.  If your nausea and vomiting persists for 24 hours, please notify your surgeon.        All narcotic pain medications, along with inactivity and anesthesia, can cause constipation. Drinking plenty of liquids and increasing fiber intake will help.      For any questions of a medical nature, call your surgeon.      Do not make important decisions for 24 hours.      If you had general anesthesia, you may have a sore throat for a couple of days related to the breathing tube used during surgery.  You may use Cepacol lozenges to help with this discomfort.  If it worsens or if you develop a fever, contact your surgeon.       If you feel your pain is not well managed with the pain medications prescribed by your surgeon, please contact your surgeon's office to let them know so they can address your concerns.       Cystoscopy Discharge Instructions      Diet:    Return to the diet that you were on before the procedure, unless you are given specific diet instructions.    It is important to drink 6-8 glasses of fluids per day at home - at least 3-4 glasses should be water.    Activity:    Walk short distances and increase as your strength allows.    You may climb stairs.    Do not do strenuous exercise or heavy lifting until approved by surgeon.    Do  "not drive while taking narcotic pain medications.    Bathing:    You may take a shower.    Call your physician if these signs/symptoms are present:    Pain that is not relieved by a short rest or ordered pain medications.    Temperature at or above 101.0 F or chills.    Inability or difficulty urinating.  Excessive blood in urine.    Any questions or concerns.        Pending Results     No orders found from 2017 to 2017.            Admission Information     Date & Time Provider Department Dept. Phone    2017 Jeferson White MD [facility] PACU [phone]      Your Vitals Were     Blood Pressure Pulse Temperature Respirations Height Weight    130/69 63 96.8  F (36  C) 18 1.702 m (5' 7\") 80.3 kg (177 lb)    Pulse Oximetry BMI (Body Mass Index)                96% 27.72 kg/m2          MyChart Information     [employer] gives you secure access to your electronic health record. If you see a primary care provider, you can also send messages to your care team and make appointments. If you have questions, please call your primary care clinic.  If you do not have a primary care provider, please call [phone] and they will assist you.        Care EveryWhere ID     This is your Care EveryWhere ID. This could be used by other organizations to access your [facility] medical records  [id number]           Review of your medicines      START taking        Dose / Directions    clindamycin 300 MG capsule   Commonly known as:  CLEOCIN   Used for:  Unilateral ureteral reflux        Dose:  300 mg   Take 1 capsule (300 mg) by mouth 3 times daily for 4 days   Quantity:  12 capsule   Refills:  0         CONTINUE these medicines which have NOT CHANGED        Dose / Directions    ibuprofen 400 MG tablet   Commonly known as:  ADVIL/MOTRIN   Used for:   (spontaneous vaginal delivery)        Dose:  400-800 mg   Take 1-2 tablets (400-800 mg) by mouth every 6 hours as needed for other (cramping)   Quantity:  120 " tablet   Refills:  1            Where to get your medicines      Some of these will need a paper prescription and others can be bought over the counter. Ask your nurse if you have questions.     Bring a paper prescription for each of these medications     clindamycin 300 MG capsule                Protect others around you: Learn how to safely use, store and throw away your medicines at www.disposemymeds.org.             Medication List: This is a list of all your medications and when to take them. Check marks below indicate your daily home schedule. Keep this list as a reference.      Medications           Morning Afternoon Evening Bedtime As Needed    clindamycin 300 MG capsule   Commonly known as:  CLEOCIN   Take 1 capsule (300 mg) by mouth 3 times daily for 4 days                                ibuprofen 400 MG tablet   Commonly known as:  ADVIL/MOTRIN   Take 1-2 tablets (400-800 mg) by mouth every 6 hours as needed for other (cramping)

## 2017-06-07 NOTE — DISCHARGE INSTRUCTIONS
Same Day Surgery Discharge Instructions for  Sedation and General Anesthesia       It's not unusual to feel dizzy, light-headed or faint for up to 24 hours after surgery or while taking pain medication.  If you have these symptoms: sit for a few minutes before standing and have someone assist you when you get up to walk or use the bathroom.      You should rest and relax for the next 24 hours. We recommend you make arrangements to have an adult stay with you for at least 24 hours after your discharge.  Avoid hazardous and strenuous activity.      DO NOT DRIVE any vehicle or operate mechanical equipment for 24 hours following the end of your surgery.  Even though you may feel normal, your reactions may be affected by the medication you have received.      Do not drink alcoholic beverages for 24 hours following surgery.       Slowly progress to your regular diet as you feel able. It's not unusual to feel nauseated and/or vomit after receiving anesthesia.  If you develop these symptoms, drink clear liquids (apple juice, ginger ale, broth, 7-up, etc. ) until you feel better.  If your nausea and vomiting persists for 24 hours, please notify your surgeon.        All narcotic pain medications, along with inactivity and anesthesia, can cause constipation. Drinking plenty of liquids and increasing fiber intake will help.      For any questions of a medical nature, call your surgeon.      Do not make important decisions for 24 hours.      If you had general anesthesia, you may have a sore throat for a couple of days related to the breathing tube used during surgery.  You may use Cepacol lozenges to help with this discomfort.  If it worsens or if you develop a fever, contact your surgeon.       If you feel your pain is not well managed with the pain medications prescribed by your surgeon, please contact your surgeon's office to let them know so they can address your concerns.       Cystoscopy Discharge  Instructions      Diet:    Return to the diet that you were on before the procedure, unless you are given specific diet instructions.    It is important to drink 6-8 glasses of fluids per day at home - at least 3-4 glasses should be water.    Activity:    Walk short distances and increase as your strength allows.    You may climb stairs.    Do not do strenuous exercise or heavy lifting until approved by surgeon.    Do not drive while taking narcotic pain medications.    Bathing:    You may take a shower.    Call your physician if these signs/symptoms are present:    Pain that is not relieved by a short rest or ordered pain medications.    Temperature at or above 101.0 F or chills.    Inability or difficulty urinating.  Excessive blood in urine.    Any questions or concerns.

## 2017-06-07 NOTE — ANESTHESIA CARE TRANSFER NOTE
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    Anesthesia Type:   General, LMA     Note:  Airway :Face Mask  Patient transferred to:PACU  Comments: Pt exhibits spont resps, all monitors and alarms on in pacu, report given to RN, vss.      Vitals: (Last set prior to Anesthesia Care Transfer)    CRNA VITALS  6/7/2017 1308 - 6/7/2017 1344      6/7/2017             Pulse: 94    SpO2: 100 %                Electronically Signed By: JAMEY Resendiz CRNA  June 7, 2017  1:44 PM

## 2017-06-07 NOTE — OR NURSING
Awaiting new antibiotic RX from Dr. White because Clindamycin not recommended in breastfeeding pts.   Pt. Would like new RX called to her home pharmacy so that she can leave now and not wait for Dr. White to come out of surgery.

## 2017-06-07 NOTE — IP AVS SNAPSHOT
[facility] [address]    [place] [address]    Phone:  [phone]                                       After Visit Summary   6/7/2017    Paulette Singer    MRN: [id number]           After Visit Summary Signature Page     I have received my discharge instructions, and my questions have been answered. I have discussed any challenges I see with this plan with the nurse or doctor.    ..........................................................................................................................................  Patient/Patient Representative Signature      ..........................................................................................................................................  Patient Representative Print Name and Relationship to Patient    ..................................................               ................................................  Date                                            Time    ..........................................................................................................................................  Reviewed by Signature/Title    ...................................................              ..............................................  Date                                                            Time

## 2017-06-07 NOTE — ANESTHESIA PREPROCEDURE EVALUATION
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     Allergies   Allergen Reactions     Amoxicillin Rash     Penicillins Rash     Past Medical History:   Diagnosis Date     Abnormal CAT scan 03/28/2017    bilateral duplicate renal     Headache 9/4/2007     Low ferritin 11/2016    3rd pregnancy     Migraines age 17    migraine/sinus headaches, better with excedrin     Plantar fasciitis 2/25/2009    See podiatry consult     Past Surgical History:   Procedure Laterality Date     CYSTOSCOPY, RETROGRADES, COMBINED Bilateral 4/19/2017    Procedure: COMBINED CYSTOSCOPY, RETROGRADES;  CYSTOSCOPY, LEFT URETEROSCOPY, LEFT STENT PLACEMENT & RETRIEVAL, BILATERAL RETROGRADES;  Surgeon: Jeferson White MD;  Location:  OR     CYSTOSCOPY, URETEROSCOPY, COMBINED Left 4/19/2017    Procedure: COMBINED CYSTOSCOPY, URETEROSCOPY;;  Surgeon: Jeferson White MD;  Location:  OR      UGI ENDOSCOPY, SIMPLE EXAM  09/11/2007     HERNIA REPAIR, INGUINAL RT/LT      bilateral as a baby     INSERT INTRAUTERINE DEVICE  6/2009    Mirena IUD under anesthetic     MOUTH SURGERY      [place] teeth     PELVIS LAPAROSCOPY,DX  6/2009    normal     Prior to Admission medications    Medication Sig Start Date End Date Taking? Authorizing Provider   ibuprofen (ADVIL/MOTRIN) 400 MG tablet Take 1-2 tablets (400-800 mg) by mouth every 6 hours as needed for other (cramping) 2/24/17  Yes Rah Hill MD     Current Facility-Administered Medications Ordered in Epic   Medication Dose Route Frequency Last Rate Last Dose     clindamycin (CLEOCIN) infusion 900 mg  900 mg Intravenous Pre-Op/Pre-procedure x 1 dose         No current [facility]-ordered outpatient prescriptions on file.     Wt Readings from Last 1 Encounters:   06/07/17 80.3 kg (177 lb)     Temp Readings from Last 1 Encounters:   06/07/17 36.3  C (97.4  F) (Oral)     BP Readings from Last 6  Encounters:   06/07/17 127/73   06/05/17 120/70   04/19/17 119/66   04/13/17 120/70   03/17/17 133/75   03/07/17 140/80     Pulse Readings from Last 4 Encounters:   06/07/17 63   06/05/17 88   04/13/17 84   03/17/17 61     Resp Readings from Last 1 Encounters:   06/07/17 16     SpO2 Readings from Last 1 Encounters:   06/07/17 97%     Recent Labs   Lab Test  03/17/17   1820  11/01/15   2105   NA  142  139   POTASSIUM  4.1  3.8   CHLORIDE  107  109   CO2  26  26   ANIONGAP  9  4   GLC  90  95   BUN  26  14   CR  0.94  0.87   GAUTAM  8.6  8.3*     Recent Labs   Lab Test  04/18/17   1029  03/17/17   1820   WBC  8.0  8.9   HGB  14.3  13.7   PLT  236  297     Recent Labs   Lab Test 10/19/14   INR  0.66          Anesthesia Evaluation     .             ROS/MED HX    ENT/Pulmonary:      (-) tobacco use, asthma, COPD and sleep apnea   Neurologic:     (+)migraines,    (-) seizures and CVA   Cardiovascular:        (-) CAD, BERRY, arrhythmias and dyslipidemia   METS/Exercise Tolerance:  >4 METS   Hematologic:        (-) history of blood clots, anemia and other hematologic disorder   Musculoskeletal:        (-) arthritis   GI/Hepatic:        (-) GERD and liver disease   Renal/Genitourinary:     (+) Other Renal/ Genitourinary, frequent UTI's, bilateral ectopic ureters   (-) renal disease   Endo:      (-) Type I DM, Type II DM and thyroid disease   Psychiatric:         Infectious Disease:        (-) Recent Fever   Malignancy:         Other:                     Physical Exam  Normal systems: cardiovascular, pulmonary and dental    Airway   Mallampati: II  TM distance: >3 FB  Neck ROM: full    Dental     Cardiovascular   Rhythm and rate: regular and normal  (-) no murmur    Pulmonary                     Anesthesia Plan      History & Physical Review      ASA Status:  2 .    NPO Status:  > 8 hours    Plan for General and LMA with Propofol induction. Maintenance will be Balanced.    PONV prophylaxis:  Ondansetron (or other 5HT-3) and  Dexamethasone or Solumedrol  Propofol infusion  Avoid versed      Postoperative Care  Postoperative pain management:  IV analgesics and Oral pain medications.      Consents  Anesthetic plan, risks, benefits and alternatives discussed with:  Patient..                          .

## 2017-06-08 NOTE — OP NOTE
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      PROCEDURE:  Cystoscopy and submucosal injection of Deflux.      SURGEON:  Jeferson White MD      ASSISTANT:  Dr. oCrnell for assistance with needle placement and injection.      OPERATIVE NOTE:  Informed consent was obtained from the patient.  She was brought to the operating room, given laryngeal mask anesthesia, placed in lithotomy position and sterile prep and drape were applied and surgical timeout was taken.  Cystoscope was introduced into the bladder and the lower pole of the left ureteral orifice was visualized in more or less in a normal position.  The right ureteral orifice was visualized and the ectopic orifice just at the bladder neck was visualized.  An open-ended ureteral catheter and sensor curved tip Glidewire was advanced up the ectopic ureter.  The Glidewire was left in place and the cystoscope was removed.  A pediatric cystoscope with 30 a degree lens was then inserted into the urethra and up the patulous ectopic ureter.  The injection needle was placed through the cystoscope and we first injected submucosally at approximately the 4 o'clock position after advancing the needle through the ureteral wall to the appropriate depth.  5 mL of the Deflux material was injected in this position and the needle and scope were withdrawn after waiting 30 seconds.  A second injection was done more distally in the 6 o'clock position and another 5 mL were injected.  After 30 seconds the needle and scope were removed.  Bladder was drained.  Guide wire was removed then 10 cc of viscous lidocaine was injected into the urethra for postoperative analgesia.  She tolerated the procedure well and there was minimal blood loss.      She will be discharged home with 4 days of clindamycin 3 times a day and should schedule a followup [facility] in  [place] in approximately 1 months' time.         JEFERSON CALLAHAN MD             D: 2017 13:43   T: 2017 00:28   MT: [id number]      Name:     DANIEL GOLDMAN   MRN:      [id number]        Account:        [id number]   :      1985           Procedure Date: 2017      Document: [id number]       cc: Jeferson Callahan MD

## 2017-06-15 ENCOUNTER — MEDICAL CORRESPONDENCE (OUTPATIENT)
Dept: HEALTH INFORMATION MANAGEMENT | Facility: CLINIC | Age: 32
End: 2017-06-15

## 2017-06-15 DIAGNOSIS — M54.9 BACK PAIN: ICD-10-CM

## 2017-06-15 DIAGNOSIS — M62.81 MUSCLE WEAKNESS (GENERALIZED): Primary | ICD-10-CM

## 2017-06-16 ENCOUNTER — MYC MEDICAL ADVICE (OUTPATIENT)
Dept: FAMILY MEDICINE | Facility: CLINIC | Age: 32
End: 2017-06-16

## 2017-06-16 DIAGNOSIS — R10.9 LEFT FLANK PAIN: ICD-10-CM

## 2017-06-16 DIAGNOSIS — Q62.5 DOUBLE URETER ON LEFT: Primary | ICD-10-CM

## 2017-06-18 NOTE — TELEPHONE ENCOUNTER
Please find out who she would like to see - I'm fine getting her in with someone else.  I do not have an opinion on a physician.  Electronically signed by Sona Borja PA-C  6/18/2017

## 2017-06-28 ENCOUNTER — TRANSFERRED RECORDS (OUTPATIENT)
Dept: HEALTH INFORMATION MANAGEMENT | Facility: CLINIC | Age: 32
End: 2017-06-28

## 2017-07-10 ENCOUNTER — TRANSFERRED RECORDS (OUTPATIENT)
Dept: HEALTH INFORMATION MANAGEMENT | Facility: CLINIC | Age: 32
End: 2017-07-10

## 2017-07-24 ENCOUNTER — MYC MEDICAL ADVICE (OUTPATIENT)
Dept: FAMILY MEDICINE | Facility: CLINIC | Age: 32
End: 2017-07-24

## 2017-07-24 DIAGNOSIS — R79.89 LOW TSH LEVEL: Primary | ICD-10-CM

## 2017-07-25 ENCOUNTER — TELEPHONE (OUTPATIENT)
Dept: FAMILY MEDICINE | Facility: CLINIC | Age: 32
End: 2017-07-25

## 2017-07-25 DIAGNOSIS — R79.89 LOW TSH LEVEL: ICD-10-CM

## 2017-07-25 DIAGNOSIS — R79.89 LOW TSH LEVEL: Primary | ICD-10-CM

## 2017-07-25 LAB
T4 FREE SERPL-MCNC: 0.9 NG/DL (ref 0.76–1.46)
TSH SERPL DL<=0.005 MIU/L-ACNC: 0.25 MU/L (ref 0.4–4)

## 2017-07-25 PROCEDURE — 84439 ASSAY OF FREE THYROXINE: CPT | Performed by: PHYSICIAN ASSISTANT

## 2017-07-25 PROCEDURE — 84443 ASSAY THYROID STIM HORMONE: CPT | Performed by: PHYSICIAN ASSISTANT

## 2017-07-25 PROCEDURE — 36415 COLL VENOUS BLD VENIPUNCTURE: CPT | Performed by: PHYSICIAN ASSISTANT

## 2017-07-25 NOTE — TELEPHONE ENCOUNTER
Yes it would be if you can get her in with in a month.   If you can't see her then I will a referral in for [facility].  Thanks!!!

## 2017-07-25 NOTE — PROGRESS NOTES
Paulette - your TSH did return low.  Your free T4 continues in normal limits.  At this point, it's best for you to see endocrinology for further diagnostics - they may do further labs and possibly some imaging  - I will have Francoise contact you with options for the specialist.    Francoise - if [facility] is out more than a month, I'd like her to go to [facility].

## 2017-07-25 NOTE — TELEPHONE ENCOUNTER
----- Message from Sona Borja PA-C sent at 7/25/2017  2:31 PM CDT -----  Paulette - your TSH did return low.  Your free T4 continues in normal limits.  At this point, it's best for you to see endocrinology for further diagnostics - they may do further labs and possibly some imaging  - I will have Francoise contact you with options for the specialist.    Francoise - if [facility] is out more than a month, I'd like her to go to [facility].

## 2017-07-25 NOTE — TELEPHONE ENCOUNTER
Please see message below.  Sona Borja is hoping she can get in with in a month.  I can get her in at [facility] in 6 weeks.

## 2017-07-26 ENCOUNTER — TELEPHONE (OUTPATIENT)
Dept: ENDOCRINOLOGY | Facility: CLINIC | Age: 32
End: 2017-07-26

## 2017-08-01 ENCOUNTER — MYC MEDICAL ADVICE (OUTPATIENT)
Dept: FAMILY MEDICINE | Facility: CLINIC | Age: 32
End: 2017-08-01

## 2017-08-08 ENCOUNTER — TRANSFERRED RECORDS (OUTPATIENT)
Dept: HEALTH INFORMATION MANAGEMENT | Facility: CLINIC | Age: 32
End: 2017-08-08

## 2017-08-17 ENCOUNTER — HOSPITAL ENCOUNTER (OUTPATIENT)
Dept: PHYSICAL THERAPY | Facility: CLINIC | Age: 32
Setting detail: THERAPIES SERIES
End: 2017-08-17
Attending: PHYSICIAN ASSISTANT
Payer: COMMERCIAL

## 2017-08-17 PROCEDURE — 40000841 ZZH STATISTIC WOMEN'S HEALTH VISIT: Performed by: PHYSICAL THERAPIST

## 2017-08-17 PROCEDURE — 97110 THERAPEUTIC EXERCISES: CPT | Mod: GP | Performed by: PHYSICAL THERAPIST

## 2017-08-17 PROCEDURE — 97162 PT EVAL MOD COMPLEX 30 MIN: CPT | Mod: GP | Performed by: PHYSICAL THERAPIST

## 2017-08-17 NOTE — PROGRESS NOTES
08/17/17 0800   General Information   Type of Visit Initial OP Ortho PT Evaluation   Start of Care Date 08/17/17   Referring Physician Teri Gamino PA-C   Patient/Family Goals Statement To no longer have pain with intercourse, no more leaking with coughing, running and jumping,   Orders Evaluate and Treat   Orders Comment Pelvic floor PT   Date of Order 07/10/17   Insurance Type [employer]   Medical Diagnosis LBP, pelvic and perineal pain, other mm spasm, muscle weakness generalized   Surgical/Medical history reviewed Yes   Precautions/Limitations (pt has 2 ureters B)   Body Part(s)   Body Part(s) Pelvic Floor Dysfunction   Presentation and Etiology   Pertinent history of current problem (include personal factors and/or comorbidities that impact the POC) 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 Little reflux on the left. A lot of UTI's. Pain started after the 3rd baby after UTI, pain is mid to low back, HA's, feet hurt inside, pain with intercourse vaginal opening, With first child large episiotomy stitches deep. Several bouts of antibiotics. Seen naturopath (friend) supplements mostly for UTI. Thyroid right now is hyperthyroid , and is still nursing. Constipation now, typically bowel movement every day [place] #3.5, Stress incontinence with jumping , running, As a baby B inguinal hernia, and abdominal hernia. Surgery only on the B inguinal ones. Lapi 2009 for abdominal pain and the pain went away and found nothing. All the babies were vaginal. Diet: breakfast--eggs, oatmeal. Lunch--typical, supper--meat, potatoes, pasta, tacos. Fruit--1 per day, coffee 1 cup per day, a lot water. Dairy can get her bloated. The pain has just been since Feb. Work -- 20 hours per wk. Had PT for the HA years ago and went away. Pain to the touch is mostly from the waist down.  Has not done the vaginal valium . trying to run and walk.    Impairments A. Pain;D. Decreased ROM;E. Decreased flexibility;F. Decreased strength and endurance;N. Headaches;P. Bowel or bladder problems   Functional Limitations perform activities of daily living;perform required work activities;perform desired leisure / sports activities   Chronicity Chronic   Current / Previous Interventions   Diagnostic Tests: (Bladder tests see Epic)   Prior Level of Function   Prior Level of Function-Mobility independent   Current Level of Function   Patient role/employment history A. Employed   Living environment [facility]   Fall Risk Screen   Fall screen completed by PT   Per patient - Fall 2 or more times in past year? No   Per patient - Fall with injury in past year? No   Is patient a fall risk? No   Specific Questions   Specific Questions Pelvic Floor Dysfunction;Pregnancy;Women's Health   Pelvic Floor Dysfunction Questions   Regular exercise Yes  (leaking urine with running and walking)   Fluid intake-glasses/day (one glass/cup = 8oz several   Caffeinated beverages-glasses/day 1   Recent diet change? Yes  (wheat and dairy free next wk)   Do you have the sensation that you need to go to the toilet?  Yes   Number of bladder infections last year?  (several, bladder infections after intercourse)   Frequency of bowel movements:  daily    Consistency of stool?  ([place] #3.5)   Women's Health Questions   Number of pregnancies  3   Number of vaginal deliveries  3   Number of episiotomies  1   Pelvic Floor Dysfunction Objective Findings   Pain-pelvic dysfunction Pelvic pain   Areas of Tightness Other   Type of Storage Problem stress incontinence   Biofeedback electrode placement (will do the biofeedback next visit, no time pt late for apt)   Power (MMT at Levator Ani) 2/5   Endurance (Up to 10 seconds as long as still 50% power) unable   Repetitions (Contract 10 seconds or MVC, rest 4 seconds and count max number of reps)  unable   Fast Twitch (Number of 1 second contractions can do in 10 seconds. Norm=7 reps) 3   Elevation (Able to lift posterior vaginal wall toward head and public bone) (slightly)   Pelvic Patient unable to elicit a good PF contraction   Flexibility comment tightness in the levator ani superficial posterior left lateral. Also pain in B obterators. Perineal pain #7 with palpation and scar tissue from the ipisiotomy   Planned Therapy Interventions   Planned Therapy Interventions joint mobilization;manual therapy;neuromuscular re-education;ROM;strengthening;stretching   Planned Therapy Interventions Comment PF rehab   Planned Modality Interventions   Planned Modality Interventions Biofeedback   Clinical Impression   Criteria for Skilled Therapeutic Interventions Met yes, treatment indicated   PT Diagnosis urinary incontinence, pelvic pain, pain with intercourse, LBP, HA, constipation, pelvic floor dysfunction, weakness   Influenced by the following impairments ipisiotomy, extra ureters   Functional limitations due to impairments Patient unable to have intercourse without pain, unable to exercise with incontinence   Clinical Presentation Evolving/Changing   Clinical Presentation Rationale several comorbidities, complicated   Clinical Decision Making (Complexity) Moderate complexity   Therapy Frequency 1 time/week   Predicted Duration of Therapy Intervention (days/wks) 90 days   Risk & Benefits of therapy have been explained Yes   Patient, Family & other staff in agreement with plan of care Yes   Clinical Impression Comments Patient has tightness of the vaginal area, levator ani, and the obterator internus. There is scar tissue from a deep ipisiotomy that is causing a lot of pt's pain with intercourse. Patient has significant weakness of the PF musculature probable grade 2/5 with palpation. further evaluation will be done with biofeedback of the PF, and evaluating LE's and LB. The initial visit had time constraints after  the subjective info was taken.    Education Assessment   Preferred Learning Style Listening;Demonstration;Reading   Barriers to Learning No barriers   ORTHO GOALS   PT Ortho Eval Goals 1;2;3   Ortho Goal 1   Goal Identifier 1   Goal Description Patient no longer has pain with intercourse   Target Date 11/14/17   Ortho Goal 2   Goal Identifier 2   Goal Description Patient has normal strength of the PF and is able to exercise and runn with no leaking of urine   Target Date 11/14/17   Ortho Goal 3   Goal Identifier 3   Goal Description Patient no longer has LBP and tolerates her HEP with no pain   Target Date 11/14/17   Total Evaluation Time   Total Evaluation Time 30

## 2017-08-31 ENCOUNTER — HOSPITAL ENCOUNTER (OUTPATIENT)
Dept: PHYSICAL THERAPY | Facility: CLINIC | Age: 32
Setting detail: THERAPIES SERIES
End: 2017-08-31
Attending: PHYSICIAN ASSISTANT
Payer: COMMERCIAL

## 2017-08-31 PROCEDURE — 97110 THERAPEUTIC EXERCISES: CPT | Mod: GP | Performed by: PHYSICAL THERAPIST

## 2017-08-31 PROCEDURE — 40000841 ZZH STATISTIC WOMEN'S HEALTH VISIT: Performed by: PHYSICAL THERAPIST

## 2017-08-31 PROCEDURE — 90911 ZZHC PT BIOFEEDBACK (PFM): CPT | Mod: GP | Performed by: PHYSICAL THERAPIST

## 2017-09-14 ENCOUNTER — HOSPITAL ENCOUNTER (OUTPATIENT)
Dept: PHYSICAL THERAPY | Facility: CLINIC | Age: 32
Setting detail: THERAPIES SERIES
End: 2017-09-14
Attending: PHYSICIAN ASSISTANT
Payer: COMMERCIAL

## 2017-09-14 PROCEDURE — 40000841 ZZH STATISTIC WOMEN'S HEALTH VISIT: Performed by: PHYSICAL THERAPIST

## 2017-09-14 PROCEDURE — 97140 MANUAL THERAPY 1/> REGIONS: CPT | Mod: GP | Performed by: PHYSICAL THERAPIST

## 2017-09-20 ENCOUNTER — HOSPITAL ENCOUNTER (OUTPATIENT)
Dept: PHYSICAL THERAPY | Facility: OTHER | Age: 32
Setting detail: THERAPIES SERIES
End: 2017-09-20
Attending: PHYSICIAN ASSISTANT
Payer: COMMERCIAL

## 2017-09-20 PROCEDURE — 97140 MANUAL THERAPY 1/> REGIONS: CPT | Mod: GP | Performed by: PHYSICAL THERAPIST

## 2017-09-20 PROCEDURE — 40000841 ZZH STATISTIC WOMEN'S HEALTH VISIT: Performed by: PHYSICAL THERAPIST

## 2017-10-11 ENCOUNTER — HOSPITAL ENCOUNTER (OUTPATIENT)
Dept: PHYSICAL THERAPY | Facility: OTHER | Age: 32
Setting detail: THERAPIES SERIES
End: 2017-10-11
Attending: PHYSICIAN ASSISTANT
Payer: COMMERCIAL

## 2017-10-11 PROCEDURE — 97110 THERAPEUTIC EXERCISES: CPT | Mod: GP | Performed by: PHYSICAL THERAPIST

## 2017-10-11 PROCEDURE — 40000841 ZZH STATISTIC WOMEN'S HEALTH VISIT: Performed by: PHYSICAL THERAPIST

## 2017-10-25 ENCOUNTER — HOSPITAL ENCOUNTER (OUTPATIENT)
Dept: PHYSICAL THERAPY | Facility: OTHER | Age: 32
Setting detail: THERAPIES SERIES
End: 2017-10-25
Attending: PHYSICIAN ASSISTANT
Payer: COMMERCIAL

## 2017-10-25 PROCEDURE — 97140 MANUAL THERAPY 1/> REGIONS: CPT | Mod: GP | Performed by: PHYSICAL THERAPIST

## 2017-10-25 PROCEDURE — 97110 THERAPEUTIC EXERCISES: CPT | Mod: GP | Performed by: PHYSICAL THERAPIST

## 2017-10-25 PROCEDURE — 40000841 ZZH STATISTIC WOMEN'S HEALTH VISIT: Performed by: PHYSICAL THERAPIST

## 2017-12-27 NOTE — ADDENDUM NOTE
Encounter addended by: Kelli Olivier, PT on: 12/27/2017  9:16 AM<BR>     Actions taken: Flowsheet accepted, Pend clinical note, Sign clinical note, Episode resolved

## 2017-12-27 NOTE — PROGRESS NOTES
Outpatient Physical Therapy Discharge Note     Patient: Paulette Singer  : 1985    Beginning/End Dates of Reporting Period:  17 to 10/25/17    Referring Provider: Teri Quiroz Diagnosis: LBP, pelvic pain     Client Self Report: neck stiffness last couple of days. But the pelvic and LBP are gone.     Objective Measurements:  Objective Measure: pain with intercourse  Details: none    Objective Measure: leaking of urine  Details: none        Objective Measure: back pain  Details: #0       Goals:  Goal Identifier 1   Goal Description Patient no longer has pain with intercourse   Target Date 17   Date Met  10/25/17   Progress:     Goal Identifier 2   Goal Description Patient has normal strength of the PF and is able to exercise and runn with no leaking of urine   Target Date 17   Date Met  10/25/17   Progress:     Goal Identifier 3   Goal Description Patient no longer has LBP and tolerates her HEP with no pain   Target Date 17   Date Met  10/25/17   Progress:       Progress Toward Goals:   Progress this reporting period: Patient has met all the goals and doing well.       Plan:  Discharge from therapy.    Discharge:    Reason for Discharge: Patient has met all goals.      Discharge Plan: Patient to continue home program.    Thank you for the referral,            Kelli Olivier PT

## 2018-02-10 ENCOUNTER — HEALTH MAINTENANCE LETTER (OUTPATIENT)
Age: 33
End: 2018-02-10

## 2018-06-03 ENCOUNTER — MYC MEDICAL ADVICE (OUTPATIENT)
Dept: FAMILY MEDICINE | Facility: CLINIC | Age: 33
End: 2018-06-03

## 2018-06-03 DIAGNOSIS — R32 URINARY LEAKAGE: Primary | ICD-10-CM

## 2018-06-04 NOTE — TELEPHONE ENCOUNTER
Her options would be here with Dr. Bennett and Christopher-she would need appointments with both physicians to evaluate.  She likely would need both a gynecologist and urologist opinion to proceed given her symptoms.  If she prefers a female physician, she could start with Dr. Bradford or Clayton in [place] but she needs to be made aware that they only do procedures and [place] and not in [place].    Electronically signed by Sona Borja PA-C  6/4/2018

## 2018-06-22 ENCOUNTER — MYC MEDICAL ADVICE (OUTPATIENT)
Dept: FAMILY MEDICINE | Facility: CLINIC | Age: 33
End: 2018-06-22

## 2018-06-25 NOTE — TELEPHONE ENCOUNTER
Referral faxed to [facility] for scheduling of appointment [facility] for further evaluation with GYN and Urology. [employer] message also sent to patient to inform of referral being sent and number to call to inquirer on an appointment.  Kelli Rouse LPN

## 2018-07-05 ENCOUNTER — OFFICE VISIT (OUTPATIENT)
Dept: UROLOGY | Facility: CLINIC | Age: 33
End: 2018-07-05
Attending: OBSTETRICS & GYNECOLOGY
Payer: COMMERCIAL

## 2018-07-05 VITALS
SYSTOLIC BLOOD PRESSURE: 131 MMHG | BODY MASS INDEX: 26.93 KG/M2 | DIASTOLIC BLOOD PRESSURE: 79 MMHG | WEIGHT: 171.6 LBS | HEIGHT: 67 IN | HEART RATE: 80 BPM

## 2018-07-05 DIAGNOSIS — N81.4 PROLAPSE OF UTERUS: ICD-10-CM

## 2018-07-05 DIAGNOSIS — N81.11 CYSTOCELE, MIDLINE: ICD-10-CM

## 2018-07-05 DIAGNOSIS — N39.3 SUI (STRESS URINARY INCONTINENCE, FEMALE): Primary | ICD-10-CM

## 2018-07-05 PROCEDURE — G0463 HOSPITAL OUTPT CLINIC VISIT: HCPCS | Mod: ZF

## 2018-07-05 RX ORDER — MUPIROCIN 20 MG/G
1 OINTMENT TOPICAL PRN
COMMUNITY
Start: 2018-07-03 | End: 2018-07-05

## 2018-07-05 RX ORDER — MUPIROCIN 20 MG/G
1 OINTMENT TOPICAL 3 TIMES DAILY
COMMUNITY
End: 2018-09-27

## 2018-07-05 RX ORDER — PHENAZOPYRIDINE HYDROCHLORIDE 200 MG/1
200 TABLET, FILM COATED ORAL ONCE
Status: CANCELLED | OUTPATIENT
Start: 2018-07-05 | End: 2018-07-05

## 2018-07-05 RX ORDER — CLINDAMYCIN PHOSPHATE 900 MG/50ML
900 INJECTION, SOLUTION INTRAVENOUS SEE ADMIN INSTRUCTIONS
Status: CANCELLED | OUTPATIENT
Start: 2018-07-05

## 2018-07-05 RX ORDER — CLINDAMYCIN PHOSPHATE 900 MG/50ML
900 INJECTION, SOLUTION INTRAVENOUS
Status: CANCELLED | OUTPATIENT
Start: 2018-07-05

## 2018-07-05 ASSESSMENT — PAIN SCALES - GENERAL: PAINLEVEL: NO PAIN (0)

## 2018-07-05 NOTE — PROGRESS NOTES
"2018    Referring Provider: Sona Borja PA-C  [address] [place] [address]    Primary Care Provider: Sona Borja    CC: Mixed incontinence and dyspareunia     HPI:  Paulette Singer, 32yo , is here for evaluation of mixed incontinence and dyspareunia. Patient has a hx of self-treated chronic UTI vs. 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 Patient's goal today is to understand etiologies behind her symptoms and fix them. Patient is done with childbearing and is currently using vasectomy as contraception. See below for details about mixed incontinence and dyspareunia.    Prolapse:  Do you feel a vaginal bulge? no                                    Pressure? Yes. Low pelvic pressure when resting, which is not related to bladder fullness.  Do you have to place your fingers in the vagina or in the rectum to have a bowel movement? No.  Impact to quality of life? no    Stress Incontinence:  Do you leak urine with cough, sneeze, exercise? Yes. Since her first vaginal delivery, pt has had worsening urine leakage with running that requires a pad. Pt reports occasional urine leakage with coughing, sneezing, and other activities that increase abdominal pressure.   How often do you leak with cough, sneeze, exercise? Always with running, but occasionally with other activities.  How much do you usually leak? A lot. Sometimes it will soak a full pad.  Do you wear a pad? Yes, but not all the time because it increase her chances/symptoms for UTIs (dysuria, frequency, vaginal discharge, vaginal odor).  Impact to quality of life? Yes. Pt would love to run again without " fearing of leakage.    Urge Incontinence:  Do you often get sudden urges to urinate? Yes.  How often do have urges? Sometimes.  If so, do you leak with these urges? Sometimes, especially when she is holding her toddler (~25lbs) and when the urges comes.  How much do you usually leak? Not the full bladder, just some.  Impact to quality of life? yes    Voids/day: unknown  Nocturia: Once per night  Fluid intake: 16-24oz  Caffeine: yes, one cup per day in the morning    Urinating:  Difficulty starting urination or strain to void? No.  Weak or intermittent stream? no  Incomplete emptying or dribbling? Yes with incomplete emptying.  Pain or burning with urination? Yes, when with UTI symptoms, but not now.  Any blood in your urine? no    GI:  Constipation? no  Frequency stools: daily    Straining for stools: no  Stool consistency: soft     Ever leak stool (Accidental Bowel Leakage)? no      Soiling without sensation? no  History of irritable bowel or Crohn's? no    Sexual/Pain:  Are you currently having sex?. Yes, with her   Pain with sex?   Yes. Not with penile entering, but feels like the penis is hitting something in the middle of penetration.   Sexual Partner:   Do any of these symptoms interfere with sex? Yes  Impact to quality of life? Yes    Prior therapy:  Ever done pelvic floor physical therapy? Yes, but it did not help/  Trial of medication? No.  Have you ever tried a pessary? No.    Medical History:  Do you have?   High Cholesterol? No  Diabetes? No  High Blood pressure? No     Recurrent UTIs? YES  Sleep Apnea? No  Other medical problems: No    Surgical History:    Hysterectomy? no   Bladder Surgery? no   Other? See HPI     OB/Gyn History:  Pregnancies? No  Deliveries? 3 vaginal deliveries.  Current birth control? Vasectomy.  Last Pap smear? 2014 NIL    Medications/Vitamins/Supplements: OTC homeopathic treatments for UTIs     Drug Allergies: amoxicillin, penicillin    Latex Allergy: None  Iodine  Allergy none    Family History: (list relationship and age at diagnosis)  Breast cancer? no  Ovarian cancer? no   Colon cancer? no  Other? no    Social History:  Marital status:   Do you/ have you ever smoke(d)  cigarettes? no  Drink more than 1 alcoholic beverage a day?  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    In the past 3 months have you regularly experienced:  Chest pain w/ walking/exercise? no                   Unusual headaches? no  Leg pain w/ walking/exercise? no                       Easy bruising? no  Difficulty breathing w/ walking/exercise? no  Problems with vision? no  Dizziness, falls, or fainting? no  Excessive bleeding from cuts, gums, surgery? no  Other: none    Past Medical History:   Diagnosis Date     Abnormal CAT scan 03/28/2017    bilateral duplicate renal     Headache 9/4/2007     Low ferritin 11/2016    3rd pregnancy     Migraines age 17    migraine/sinus headaches, better with excedrin     Plantar fasciitis 2/25/2009    See podiatry consult       Past Surgical History:   Procedure Laterality Date     CYSTOSCOPY, INJECT VESICOURETERAL REFLUX GEL N/A 6/7/2017    Procedure: CYSTOSCOPY, INJECT VESICOURETERAL REFLUX GEL;  CYSTOSCOPY WITH SUBMUCOSAL INJECTION OF DEFLUX ;  Surgeon: Jefesron White MD;  Location:  OR     CYSTOSCOPY, RETROGRADES, COMBINED Bilateral 4/19/2017    Procedure: COMBINED CYSTOSCOPY, RETROGRADES;  CYSTOSCOPY, LEFT URETEROSCOPY, LEFT STENT PLACEMENT & RETRIEVAL, BILATERAL RETROGRADES;  Surgeon: Jeferson White MD;  Location:  OR     CYSTOSCOPY, URETEROSCOPY, COMBINED Left 4/19/2017    Procedure: COMBINED CYSTOSCOPY, URETEROSCOPY;;  Surgeon: Jeferson White MD;  Location:  OR      UGI ENDOSCOPY, SIMPLE EXAM  09/11/2007     HERNIA REPAIR, INGUINAL RT/LT      bilateral as a baby     INSERT INTRAUTERINE DEVICE  6/2009    Mirena IUD under anesthetic     MOUTH SURGERY      [place] teeth     PELVIS LAPAROSCOPY,DX  6/2009    normal       Social History  "    Social History     Marital status:      Spouse name: N/A     Number of children: 0     Years of education: N/A     Occupational History     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 No      Service No     Blood Transfusions No     Caffeine Concern No     Occupational Exposure Yes     Holistic healing     Hobby Hazards No     Sleep Concern No     Stress Concern No     Weight Concern No     Special Diet No     Back Care No     Exercise Yes     Seat Belt Yes     Social History Narrative    Lives in [place] with , Timur and their two sons.  No concerns about domestic violence.  No smokers in the home.  No indoor cats/kittens.       Family History   Problem Relation Age of Onset     Depression Mother      Thyroid Disease Mother      Gynecology Sister      endometriosis     Cancer Paternal Grandmother      eye       ROS    Allergies   Allergen Reactions     Amoxicillin Rash     Penicillins Rash       Current Outpatient Prescriptions   Medication     mupirocin (BACTROBAN) 2 % ointment     No current facility-administered medications for this visit.        /79  Pulse 80  Ht 1.702 m (5' 7.01\")  Wt 77.8 kg (171 lb 9.6 oz)  LMP 06/26/2018  BMI 26.87 kg/m2 Patient's last menstrual period was 06/26/2018. Body mass index is 26.87 kg/(m^2).  Pt is alert, comfortable in no acute distress, non-labored breathing.   Abdomen is soft, non-tender, non-distended, no CVAT.    Normal external female genitalia. The urethra was pink, moist, and appeared normal.    She has strong " support on supine strain.  Speculum and bimanual exam are remarkable for grade 2 pelvic organ prolapse.      2/5 kegels.        POPQ  Aa -1  Ba -1  C -5  D 4  GH 4  PB 10  TVL -1  Ap -1  Bp -6    SST: neg  VOID 320 ml  PVR 25 mL in and out cath  Urine dip: none    A/P: Paulette Singer is a 33 year old F with mixed incontinence and stage 2 cystocele and stage 1 uterine prolapse, which is contributing to her dyspareunia.     We talked about the benefits and risks of all the options for management of mixed incontinence, including physical therapy for strengthening pelvic floor muscles, pessary, medication for urge incontinence, or surgical management. Patient would like a more permanent fix for her problems because they have interfered with her daily life greatly after her attempts with PT.  In discussion regarding surgical management, we discussed tension-free vaginal sling via vaginal approach, total vaginal hysterectomy with a uterosacral ligament suspension.  After through discussion, patient would like tension-free vaginal sling via vaginal approach, TVH and USLS. We also talked about recovery periods and expectations of above procedures in terms of alleviating her symptoms.     A total of 60 minutes were spent with the patient today, > 50% in counseling and coordination of care    CC  Patient Care Team:  Sona Lee PAWadeC as PCP - General  Ed Noriega MD as MD (OB/Gyn)  SONA LEE MD  [facility] PGY-1    I attest that I was present for the history and evaluation of the patient and I agree with the findings and plan as outlined above. Pt will ne office cystometrics to further evaluate her complaints of AMANDA.    Ed Noriega MD  Professor, OB/GYN  Urogynecologist

## 2018-07-05 NOTE — NURSING NOTE
Patient tested negative for urinary stress incontinence.  She felt wetness though-  Voided 200 ml   Bladder scan showed 32 ml residual.

## 2018-07-05 NOTE — MR AVS SNAPSHOT
"              After Visit Summary   7/5/2018    Paulette Singer    MRN: [id number]           Patient Information     Date Of Birth          1985        Visit Information        Provider Department      7/5/2018 1:00 PM Ed Noriega MD [facility]        Today's Diagnoses     AMANDA (stress urinary incontinence, female)    -  1    Prolapse of uterus        Cystocele, midline           Follow-ups after your visit        Who to contact     Please call your clinic at [phone] to:    Ask questions about your health    Make or cancel appointments    Discuss your medicines    Learn about your test results    Speak to your doctor            Additional Information About Your Visit        MyChart Information     [email or web address] gives you secure access to your electronic health record. If you see a primary care provider, you can also send messages to your care team and make appointments. If you have questions, please call your primary care clinic.  If you do not have a primary care provider, please call [phone] and they will assist you.      [email or web address] is an electronic gateway that provides easy, online access to your medical records. With [email or web address], you can request a clinic appointment, read your test results, renew a prescription or communicate with your care team.     To access your existing account, please contact your [facility] or call [phone] for assistance.        Care EveryWhere ID     This is your Care EveryWhere ID. This could be used by other organizations to access your [place] medical records  [id number]        Your Vitals Were     Pulse Height Last Period BMI (Body Mass Index)          80 1.702 m (5' 7.01\") 06/26/2018 26.87 kg/m2         Blood Pressure from Last 3 Encounters:   07/05/18 131/79   06/07/17 127/74   06/05/17 120/70    Weight from Last 3 Encounters:   07/05/18 77.8 kg (171 lb 9.6 oz)   06/07/17 80.3 kg (177 lb)   06/05/17 82.6 kg (182 " lb)              We Performed the Following     Sharonda-Operative Worksheet (Total Vaginal Hysterectomy)          Today's Medication Changes          These changes are accurate as of 7/5/18  3:27 PM.  If you have any questions, ask your nurse or doctor.               These medicines have changed or have updated prescriptions.        Dose/Directions    mupirocin 2 % ointment   Commonly known as:  BACTROBAN   This may have changed:  Another medication with the same name was removed. Continue taking this medication, and follow the directions you see here.   Changed by:  Ed Noriega MD        Dose:  1 each   Apply 1 each topically 3 times daily   Refills:  0         Stop taking these medicines if you haven't already. Please contact your care team if you have questions.     ibuprofen 400 MG tablet   Commonly known as:  ADVIL/MOTRIN   Stopped by:  Ed Noriega MD                    Primary Care Provider Office Phone # Fax #    Sona CHELSEA Borja PA-C [phone] [address] [place] [address]        Equal Access to Services     [facility] AH: Hadii aad ku hadasho Soomaali, waaxda luqadaha, qaybta kaalmada adeegyada, waxay idiin hayaan alli kharaariel sandoval . So [facility] [place] [phone].    ATENCIÓN: Si brynn massey, tiene a william disposición servicios gratuitos de asistencia lingüística. Llame al [phone].    We comply with applicable federal civil rights laws and [place] laws. We do not discriminate on the basis of race, color, national origin, age, disability, sex, sexual orientation, or gender identity.            Thank you!     Thank you for choosing [facility]  for your care. Our goal is always to provide you with excellent care. Hearing back from our patients is one way we can continue to improve our services. Please take a few minutes to complete the written survey that you may receive in the mail after your visit with us. Thank you!             Your Updated  Medication List - Protect others around you: Learn how to safely use, store and throw away your medicines at www.disposemymeds.org.          This list is accurate as of 7/5/18  3:27 PM.  Always use your most recent med list.                   Brand Name Dispense Instructions for use Diagnosis    mupirocin 2 % ointment    BACTROBAN     Apply 1 each topically 3 times daily

## 2018-07-05 NOTE — LETTER
"2018     RE: Paulette Singer  [address] [facility] [address]     Dear Colleague,    Thank you for referring your patient, Paulette Singer, to the [facility] at [facility]. Please see a copy of my visit note below.    2018    Referring Provider: Sona Borja PA-C  [address] [place] [address]    Primary Care Provider: Sona Borja    CC: Mixed incontinence and dyspareunia     HPI:  Paulette Singer, 34yo , is here for evaluation of mixed incontinence and dyspareunia. Patient has a hx of self-treated chronic UTI vs. 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 Patient's goal today is to understand etiologies behind her symptoms and fix them. Patient is done with childbearing and is currently using vasectomy as contraception. See below for details about mixed incontinence and dyspareunia.    Prolapse:  Do you feel a vaginal bulge? no                                    Pressure? Yes. Low pelvic pressure when resting, which is not related to bladder fullness.  Do you have to place your fingers in the vagina or in the rectum to have a bowel movement? No.  Impact to quality of life? no    Stress Incontinence:  Do you leak urine with cough, sneeze, exercise? Yes. Since her first vaginal delivery, pt has had worsening urine leakage with running that requires a pad. Pt reports occasional urine leakage with coughing, sneezing, and other activities that increase abdominal pressure.   How often do you leak with cough, sneeze, exercise? Always with running, but occasionally with other activities.  How " much do you usually leak? A lot. Sometimes it will soak a full pad.  Do you wear a pad? Yes, but not all the time because it increase her chances/symptoms for UTIs (dysuria, frequency, vaginal discharge, vaginal odor).  Impact to quality of life? Yes. Pt would love to run again without fearing of leakage.    Urge Incontinence:  Do you often get sudden urges to urinate? Yes.  How often do have urges? Sometimes.  If so, do you leak with these urges? Sometimes, especially when she is holding her toddler (~25lbs) and when the urges comes.  How much do you usually leak? Not the full bladder, just some.  Impact to quality of life? yes    Voids/day: unknown  Nocturia: Once per night  Fluid intake: 16-24oz  Caffeine: yes, one cup per day in the morning    Urinating:  Difficulty starting urination or strain to void? No.  Weak or intermittent stream? no  Incomplete emptying or dribbling? Yes with incomplete emptying.  Pain or burning with urination? Yes, when with UTI symptoms, but not now.  Any blood in your urine? no    GI:  Constipation? no  Frequency stools: daily    Straining for stools: no  Stool consistency: soft     Ever leak stool (Accidental Bowel Leakage)? no      Soiling without sensation? no  History of irritable bowel or Crohn's? no    Sexual/Pain:  Are you currently having sex?. Yes, with her   Pain with sex?   Yes. Not with penile entering, but feels like the penis is hitting something in the middle of penetration.   Sexual Partner:   Do any of these symptoms interfere with sex? Yes  Impact to quality of life? Yes    Prior therapy:  Ever done pelvic floor physical therapy? Yes, but it did not help/  Trial of medication? No.  Have you ever tried a pessary? No.    Medical History:  Do you have?   High Cholesterol? No  Diabetes? No  High Blood pressure? No     Recurrent UTIs? YES  Sleep Apnea? No  Other medical problems: No    Surgical History:    Hysterectomy? no   Bladder Surgery? no   Other? See  HPI     OB/Gyn History:  Pregnancies? No  Deliveries? 3 vaginal deliveries.  Current birth control? Vasectomy.  Last Pap smear? 2014 NIL    Medications/Vitamins/Supplements: OTC homeopathic treatments for UTIs     Drug Allergies: amoxicillin, penicillin    Latex Allergy: None  Iodine Allergy none    Family History: (list relationship and age at diagnosis)  Breast cancer? no  Ovarian cancer? no   Colon cancer? no  Other? no    Social History:  Marital status:   Do you/ have you ever smoke(d)  cigarettes? no  Drink more than 1 alcoholic beverage a day?  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    In the past 3 months have you regularly experienced:  Chest pain w/ walking/exercise? no                   Unusual headaches? no  Leg pain w/ walking/exercise? no                       Easy bruising? no  Difficulty breathing w/ walking/exercise? no  Problems with vision? no  Dizziness, falls, or fainting? no  Excessive bleeding from cuts, gums, surgery? no  Other: none    Past Medical History:   Diagnosis Date     Abnormal CAT scan 03/28/2017    bilateral duplicate renal     Headache 9/4/2007     Low ferritin 11/2016    3rd pregnancy     Migraines age 17    migraine/sinus headaches, better with excedrin     Plantar fasciitis 2/25/2009    See podiatry consult       Past Surgical History:   Procedure Laterality Date     CYSTOSCOPY, INJECT VESICOURETERAL REFLUX GEL N/A 6/7/2017    Procedure: CYSTOSCOPY, INJECT VESICOURETERAL REFLUX GEL;  CYSTOSCOPY WITH SUBMUCOSAL INJECTION OF DEFLUX ;  Surgeon: Jeferson White MD;  Location:  OR     CYSTOSCOPY, RETROGRADES, COMBINED Bilateral 4/19/2017    Procedure: COMBINED CYSTOSCOPY, RETROGRADES;  CYSTOSCOPY, LEFT URETEROSCOPY, LEFT STENT PLACEMENT & RETRIEVAL, BILATERAL RETROGRADES;  Surgeon: Jeferson White MD;  Location:  OR     CYSTOSCOPY, URETEROSCOPY, COMBINED Left 4/19/2017    Procedure: COMBINED CYSTOSCOPY, URETEROSCOPY;;  Surgeon: Jeferson White MD;   "Location: [facility] OR     HC UGI ENDOSCOPY, SIMPLE EXAM  09/11/2007     HERNIA REPAIR, INGUINAL RT/LT      bilateral as a baby     INSERT INTRAUTERINE DEVICE  6/2009    Mirena IUD under anesthetic     MOUTH SURGERY      [place] teeth     PELVIS LAPAROSCOPY,DX  6/2009    normal       Social History     Social History     Marital status:      Spouse name: N/A     Number of children: 0     Years of education: N/A     Occupational History     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 No      Service No     Blood Transfusions No     Caffeine Concern No     Occupational Exposure Yes     Holistic healing     Hobby Hazards No     Sleep Concern No     Stress Concern No     Weight Concern No     Special Diet No     Back Care No     Exercise Yes     Seat Belt Yes     Social History Narrative    Lives in [place] with , Timur and their two sons.  No concerns about domestic violence.  No smokers in the home.  No indoor cats/kittens.       Family History   Problem Relation Age of Onset     Depression Mother      Thyroid Disease Mother      Gynecology Sister      endometriosis     Cancer Paternal Grandmother      eye       ROS    Allergies   Allergen Reactions     Amoxicillin Rash     Penicillins Rash       Current Outpatient Prescriptions   Medication     mupirocin (BACTROBAN) 2 % ointment     No current facility-administered medications for this visit.        /79  Pulse 80  Ht 1.702 m (5' 7.01\")  Wt 77.8 kg (171 lb 9.6 oz)  LMP 06/26/2018  BMI 26.87 kg/m2 Patient's last " menstrual period was 06/26/2018. Body mass index is 26.87 kg/(m^2).  Pt is alert, comfortable in no acute distress, non-labored breathing.   Abdomen is soft, non-tender, non-distended, no CVAT.    Normal external female genitalia. The urethra was pink, moist, and appeared normal.    She has strong support on supine strain.  Speculum and bimanual exam are remarkable for grade 2 pelvic organ prolapse.      2/5 kegels.        POPQ  Aa -1  Ba -1  C -5  D 4  GH 4  PB 10  TVL -1  Ap -1  Bp -6    SST: neg  VOID 320 ml  PVR 25 mL in and out cath  Urine dip: none    A/P: Paulette Singer is a 33 year old F with mixed incontinence and stage 2 cystocele and stage 1 uterine prolapse, which is contributing to her dyspareunia.     We talked about the benefits and risks of all the options for management of mixed incontinence, including physical therapy for strengthening pelvic floor muscles, pessary, medication for urge incontinence, or surgical management. Patient would like a more permanent fix for her problems because they have interfered with her daily life greatly after her attempts with PT.   In discussion regarding surgical management, we discussed tension-free vaginal sling via vaginal approach, total vaginal hysterectomy with a uterosacral ligament suspension.  After through discussion, patient would like tension-free vaginal sling via vaginal approach, TVH and USLS. We also talked about recovery periods and expectations of above procedures in terms of alleviating her symptoms.     A total of 60 minutes were spent with the patient today, > 50% in counseling and coordination of care    CC  Patient Care Team:  Sona Lee PA-C as PCP - General  Ed Noriega MD as MD (OB/Gyn)  SONA LEE MD  N OBGYN PGY-1    I attest that I was present for the history and evaluation of the patient and I agree with the findings and plan as outlined above. Pt will ne office cystometrics to further evaluate  her complaints of AMANDA.    Ed Noriega MD  Professor, OB/GYN  Urogynecologist

## 2018-07-06 ENCOUNTER — TELEPHONE (OUTPATIENT)
Dept: FAMILY MEDICINE | Facility: CLINIC | Age: 33
End: 2018-07-06

## 2018-07-06 NOTE — TELEPHONE ENCOUNTER
----- Message from Paulette Sinegr sent at 7/6/2018  2:31 PM CDT -----  Regarding: Appointment Request  Contact: [phone]  Appointment Request From: Paulette Singer    With Provider: Sona Borja PA-C [-Primary Care Physician-]    Preferred Date Range: From 7/6/2018 To 7/27/2018    Preferred Times: Any    Reason for visit: Request an Appointment    Comments:  I need a pre op physical.

## 2018-07-09 ENCOUNTER — TELEPHONE (OUTPATIENT)
Dept: OBGYN | Facility: CLINIC | Age: 33
End: 2018-07-09

## 2018-07-09 NOTE — TELEPHONE ENCOUNTER
Confirmed surgery date with patient 7/30/18 with arrival time at 6:45a.m at the [facility], pt informed nothing to eat eight hours before scheduled surgery time and clear liquids up to two hours before scheduled surgery time, informed patient that a map and letter will be mailed out.     to complete the following fields:            CHECKLIST     Google Calendar : Yes     Resident notified: Not Applicable     Clinic schedule blocked: Not Applicable    Patient notified:Yes      Pre op information sent: Yes     Given to patient over the phone.Yes    Comments:

## 2018-07-18 ENCOUNTER — OFFICE VISIT (OUTPATIENT)
Dept: UROLOGY | Facility: CLINIC | Age: 33
End: 2018-07-18
Attending: OBSTETRICS & GYNECOLOGY
Payer: COMMERCIAL

## 2018-07-18 VITALS
DIASTOLIC BLOOD PRESSURE: 75 MMHG | SYSTOLIC BLOOD PRESSURE: 127 MMHG | HEIGHT: 67 IN | WEIGHT: 170.8 LBS | HEART RATE: 74 BPM | BODY MASS INDEX: 26.81 KG/M2

## 2018-07-18 DIAGNOSIS — N81.4 PROLAPSE OF UTERUS: Primary | ICD-10-CM

## 2018-07-18 DIAGNOSIS — N39.46 MIXED STRESS AND URGE URINARY INCONTINENCE: ICD-10-CM

## 2018-07-18 DIAGNOSIS — N81.11 CYSTOCELE, MIDLINE: ICD-10-CM

## 2018-07-18 ASSESSMENT — PAIN SCALES - GENERAL: PAINLEVEL: NO PAIN (0)

## 2018-07-18 NOTE — LETTER
"2018       RE: Paulette Singer  [address] [facility] [address]     Dear Colleague,    Thank you for referring your patient, Paulette Singer, to the [facility] at [facility]. Please see a copy of my visit note below.    2018    Return Visit - Simple Cystometrics    Paulette Singer is a 32 yo F w/ PMH of mixed urinary incontinence, stage 2 cystocele, stage 1 uterovaginal prolapse, and dyspareunia who returns for an office cystometric to further evaluate AMANDA and determine whether to proceed with midurethral sling.     /75  Pulse 74  Ht 1.702 m (5' 7.01\")  Wt 77.5 kg (170 lb 12.8 oz)  LMP 2018  BMI 26.74 kg/m2   Normal external genitalia.     After the patient emptied her bladder the urethra was cleaned with betadine and a red rubber cath was inserted without difficulty under sterile conditions. The bladder was drained.     The bladder was filled to 300cc using sterile water in aliquots of 60cc. At this point, patient reported sensation of a full bladder. There was no evidence of detrusor contractions. The catheter was removed and the patient was asked to cough. There was no evidence of urine loss. The patient then stood and was asked to asked to cough. Again, there was no urine loss. The patient performed a series of jumping jacks and was unable to leak as well.     A/P; 32 yo  F w/ PMH of mixed urinary incontinence, stage 2 cystocele, stage 1 uterovaginal prolapse, and dyspareunia who returns for an office cystometrics. Office cystometrics today demonstrated no evidence of AMANDA. As a result, discussion was had regarding surgical plans and the option to not perform the sling but consider medical management or PFPT to address her mixed incontinence. It was agreed upon to not proceed with the midurethral vaginal sling, and patient felt comfortable with this. Patient reported desire to proceed with the total vaginal " hysterectomy with uterosacral ligament suspension because her dyspareunia was severe and bothersome for both her and her partner.   - continue with scheduled surgical TVH and USLS  - PFPT following recovery    I, Afshan Shipman MS4, acted as a scribe for the note above.     I attest that I performed the office cystometrics and that I counseled the patient regarding the findings and treatment options.    Ed Noriega MD  Professor, OB/GYN  Urogynecologist

## 2018-07-18 NOTE — MR AVS SNAPSHOT
After Visit Summary   7/18/2018    Paulette Singer    MRN: [id number]           Patient Information     Date Of Birth          1985        Visit Information        Provider Department      7/18/2018 1:00 PM Ed Noriega MD [facility]        Today's Diagnoses     Prolapse of uterus    -  1    Cystocele, midline        Mixed stress and urge urinary incontinence           Follow-ups after your visit        Your next 10 appointments already scheduled     Jul 23, 2018 11:00 AM CDT   Pre-Op physical with Sona Borja PA-C   [facility] ([facility]    [address] [place] [address]   [phone]            Jul 30, 2018   Procedure with Ed Noriega MD   [facility] ([facility])    [address] [place] [address]  5th [facility] [address] [phone]           Located in the Clinics and Surgery Center at [address] [place], [place] [id number].   parking is very convenient and highly recommended.  is a $6 flat rate fee.  Both  and self parkers should enter the main arrival plaza from [facility]; parking attendants will direct you based on your parking preference.            Aug 16, 2018 10:00 AM CDT   Return Urogyn with Ed Noriega MD   [facility] ([facility])    [facility]  [address] [place] [address], [facility] [address]  [facility] [address] [phone]           Please call [facility] , [phone], with any questions or concerns you may have regarding your appointment              Who to contact     Please call your clinic at [phone] to:    Ask questions about your health    Make or cancel appointments    Discuss your medicines    Learn about your test results    Speak to your doctor            Additional Information About Your Visit        MyChart Information     [employer] gives  "you secure access to your electronic health record. If you see a primary care provider, you can also send messages to your care team and make appointments. If you have questions, please call your primary care clinic.  If you do not have a primary care provider, please call [phone] and they will assist you.      [employer] is an electronic gateway that provides easy, online access to your medical records. With [employer], you can request a clinic appointment, read your test results, renew a prescription or communicate with your care team.     To access your existing account, please contact your [facility] or call [phone] for assistance.        Care EveryWhere ID     This is your Care EveryWhere ID. This could be used by other organizations to access your [facility] medical records  [id number]        Your Vitals Were     Pulse Height Last Period BMI (Body Mass Index)          74 1.702 m (5' 7.01\") 06/26/2018 26.74 kg/m2         Blood Pressure from Last 3 Encounters:   07/18/18 127/75   07/05/18 131/79   06/07/17 127/74    Weight from Last 3 Encounters:   07/18/18 77.5 kg (170 lb 12.8 oz)   07/05/18 77.8 kg (171 lb 9.6 oz)   06/07/17 80.3 kg (177 lb)              Today, you had the following     No orders found for display       Primary Care Provider Office Phone # Fax #    Sona Borja PA-C [phone] [address] [place] [address]        Equal Access to Services     ZEYAD MARTINEZ AH: Hadii aad ku hadasho Soomaali, waaxda luqadaha, qaybta kaalmada adeegyada, waxay idiin jennifer sandoval . So [facility] [phone].    ATENCIÓN: Si habla español, tiene a william disposición servicios gratuitos de asistencia lingüística. Llame al [phone].    We comply with applicable federal civil rights laws and [place] laws. We do not discriminate on the basis of race, color, national origin, age, disability, sex, sexual orientation, or gender identity.          "   Thank you!     Thank you for choosing [facility]  for your care. Our goal is always to provide you with excellent care. Hearing back from our patients is one way we can continue to improve our services. Please take a few minutes to complete the written survey that you may receive in the mail after your visit with us. Thank you!             Your Updated Medication List - Protect others around you: Learn how to safely use, store and throw away your medicines at www.disposemymeds.org.          This list is accurate as of 7/18/18  2:54 PM.  Always use your most recent med list.                   Brand Name Dispense Instructions for use Diagnosis    mupirocin 2 % ointment    BACTROBAN     Apply 1 each topically 3 times daily

## 2018-07-18 NOTE — PROGRESS NOTES
"2018    Return Visit - Simple Cystometrics    Paulette Singer is a 34 yo F w/ PMH of mixed urinary incontinence, stage 2 cystocele, stage 1 uterovaginal prolapse, and dyspareunia who returns for an office cystometric to further evaluate AMANDA and determine whether to proceed with midurethral sling.     /75  Pulse 74  Ht 1.702 m (5' 7.01\")  Wt 77.5 kg (170 lb 12.8 oz)  LMP 2018  BMI 26.74 kg/m2   Normal external genitalia.     After the patient emptied her bladder the urethra was cleaned with betadine and a red rubber cath was inserted without difficulty under sterile conditions. The bladder was drained.     The bladder was filled to 300cc using sterile water in aliquots of 60cc. At this point, patient reported sensation of a full bladder. There was no evidence of detrusor contractions. The catheter was removed and the patient was asked to cough. There was no evidence of urine loss. The patient then stood and was asked to asked to cough. Again, there was no urine loss. The patient performed a series of jumping jacks and was unable to leak as well.     A/P; 34 yo  F w/ PMH of mixed urinary incontinence, stage 2 cystocele, stage 1 uterovaginal prolapse, and dyspareunia who returns for an office cystometrics. Office cystometrics today demonstrated no evidence of AMANDA. As a result, discussion was had regarding surgical plans and the option to not perform the sling but consider medical management or PFPT to address her mixed incontinence. It was agreed upon to not proceed with the midurethral vaginal sling, and patient felt comfortable with this. Patient reported desire to proceed with the total vaginal hysterectomy with uterosacral ligament suspension because her dyspareunia was severe and bothersome for both her and her partner.   - continue with scheduled surgical TVH and USLS  - PFPT following recovery    ISRRAEL, Afshan Shipman MS4, acted as a scribe for the note above.     I attest that I " performed the office cystometrics and that I counseled the patient regarding the findings and treatment options.    Ed Noriega MD  Professor, OB/GYN  Urogynecologist

## 2018-07-23 ENCOUNTER — OFFICE VISIT (OUTPATIENT)
Dept: FAMILY MEDICINE | Facility: CLINIC | Age: 33
End: 2018-07-23
Payer: COMMERCIAL

## 2018-07-23 VITALS
BODY MASS INDEX: 27.09 KG/M2 | HEART RATE: 90 BPM | OXYGEN SATURATION: 98 % | WEIGHT: 173 LBS | RESPIRATION RATE: 18 BRPM | TEMPERATURE: 96.9 F | SYSTOLIC BLOOD PRESSURE: 122 MMHG | DIASTOLIC BLOOD PRESSURE: 70 MMHG

## 2018-07-23 DIAGNOSIS — N81.11 CYSTOCELE, MIDLINE: ICD-10-CM

## 2018-07-23 DIAGNOSIS — N39.46 MIXED STRESS AND URGE URINARY INCONTINENCE: ICD-10-CM

## 2018-07-23 DIAGNOSIS — Z01.818 PREOP GENERAL PHYSICAL EXAM: Primary | ICD-10-CM

## 2018-07-23 DIAGNOSIS — N81.4 UTERINE PROLAPSE: ICD-10-CM

## 2018-07-23 PROCEDURE — 99214 OFFICE O/P EST MOD 30 MIN: CPT | Performed by: PHYSICIAN ASSISTANT

## 2018-07-23 ASSESSMENT — PAIN SCALES - GENERAL: PAINLEVEL: NO PAIN (0)

## 2018-07-23 NOTE — MR AVS SNAPSHOT
After Visit Summary   7/23/2018    Paulette Singer    MRN: [id number]           Patient Information     Date Of Birth          1985        Visit Information        Provider Department      7/23/2018 11:00 AM Sona Borja PA-C [facility]        Today's Diagnoses     Preop general physical exam    -  1    Uterine prolapse        Cystocele, midline        Mixed stress and urge urinary incontinence          Care Instructions      Before Your Surgery      Call your surgeon if there is any change in your health. This includes signs of a cold or flu (such as a sore throat, runny nose, cough, rash or fever).    Do not smoke, drink alcohol or take over the counter medicine (unless your surgeon or primary care doctor tells you to) for the 24 hours before and after surgery.    If you take prescribed drugs: Follow your doctor s orders about which medicines to take and which to stop until after surgery.    Eating and drinking prior to surgery: follow the instructions from your surgeon    Take a shower or bath the night before surgery. Use the soap your surgeon gave you to gently clean your skin. If you do not have soap from your surgeon, use your regular soap. Do not shave or scrub the surgery site.  Wear clean pajamas and have clean sheets on your bed.           Follow-ups after your visit        Your next 10 appointments already scheduled     Jul 30, 2018   Procedure with Ed Noriega MD   [facility] ([facility])    [address] [place], [place] [address] [phone]           Located in the Clinics and Surgery Center at [address] [place] [address].   parking is very convenient and highly recommended.  is a $6 flat rate fee.  Both  and self parkers should enter the main arrival plaza from [facility]; parking attendants will direct you based on your parking preference.             Aug 16, 2018 10:00 AM CDT   Return Urogyn with Ed Noriega MD   [facility] ([facility])    [facility]  [address]  [facility] [address] [phone]           Please call [facility] , [phone], with any questions or concerns you may have regarding your appointment              Who to contact     If you have questions or need follow up information about today's clinic visit or your schedule please contact [facility] directly at [phone].  Normal or non-critical lab and imaging results will be communicated to you by [employer], letter or phone within 4 business days after the clinic has received the results. If you do not hear from us within 7 days, please contact the clinic through [employer] or phone. If you have a critical or abnormal lab result, we will notify you by phone as soon as possible.  Submit refill requests through [employer] or call your pharmacy and they will forward the refill request to us. Please allow 3 business days for your refill to be completed.          Additional Information About Your Visit        [employer] Information     [employer] gives you secure access to your electronic health record. If you see a primary care provider, you can also send messages to your care team and make appointments. If you have questions, please call your primary care clinic.  If you do not have a primary care provider, please call [phone] and they will assist you.        Care EveryWhere ID     This is your Care EveryWhere ID. This could be used by other organizations to access your [facility] medical records  [id number]        Your Vitals Were     Pulse Temperature Respirations Last Period Pulse Oximetry BMI (Body Mass Index)    90 96.9  F (36.1  C) (Temporal) 18 06/22/2018 (Exact Date) 98% 27.09 kg/m2       Blood Pressure from Last 3 Encounters:   07/23/18 122/70   07/18/18 127/75   07/05/18 131/79     Weight from Last 3 Encounters:   07/23/18 173 lb (78.5 kg)   07/18/18 170 lb 12.8 oz (77.5 kg)   07/05/18 171 lb 9.6 oz (77.8 kg)              Today, you had the following     No orders found for display       Primary Care Provider Office Phone # Fax #    Sona Borja PA-C [phone] [address] [place] [address]        Equal Access to Services     ZEYAD MARTINEZ : Hadii aad ku hadasho Soomaali, waaxda luqadaha, qaybta kaalmada adeegyada, waxay idiin hayaan adeeg kharash la'shelleyn . So [facility] [phone].    ATENCIÓN: Si habla español, tiene a william disposición servicios gratuitos de asistencia lingüística. [facility] [phone].    We comply with applicable federal civil rights laws and [place] laws. We do not discriminate on the basis of race, color, national origin, age, disability, sex, sexual orientation, or gender identity.            Thank you!     Thank you for choosing [facility]  for your care. Our goal is always to provide you with excellent care. Hearing back from our patients is one way we can continue to improve our services. Please take a few minutes to complete the written survey that you may receive in the mail after your visit with us. Thank you!             Your Updated Medication List - Protect others around you: Learn how to safely use, store and throw away your medicines at www.disposemymeds.org.          This list is accurate as of 7/23/18 12:04 PM.  Always use your most recent med list.                   Brand Name Dispense Instructions for use Diagnosis    mupirocin 2 % ointment    BACTROBAN     Apply 1 each topically 3 times daily

## 2018-07-23 NOTE — PROGRESS NOTES
[address] [address]  [phone]  Dept: [phone]    PRE-OP EVALUATION:  Today's date: 2018    Paulette Singer (: 1985) presents for pre-operative evaluation assessment as requested by Dr. Noriega.  She requires evaluation and anesthesia risk assessment prior to undergoing surgery/procedure for treatment of Prolapse Uterus, Cystocele, Mixed Stress urinary incontinence .    Fax number for surgical facility:   Primary Physician: Sona Borja  Type of Anesthesia Anticipated: General    Patient has a Health Care Directive or Living Will:  NO    Preop Questions 2018   Who is doing your surgery? dr noriega   What are you having done? hysterectomy   Date of Surgery/Procedure:    Facility or Hospital where procedure/surgery will be performed: [facility]   1.  Do you have a history of Heart attack, stroke, stent, coronary bypass surgery, or other heart surgery? No   2.  Do you ever have any pain or discomfort in your chest? No   3.  Do you have a history of  Heart Failure? No   4.   Are you troubled by shortness of breath when:  walking on a level surface, or up a slight hill, or at night? No   5.  Do you currently have a cold, bronchitis or other respiratory infection? No   6.  Do you have a cough, shortness of breath, or wheezing? No   7.  Do you sometimes get pains in the calves of your legs when you walk? No   8. Do you or anyone in your family have previous history of blood clots? No   9.  Do you or does anyone in your family have a serious bleeding problem such as prolonged bleeding following surgeries or cuts? No   10. Have you ever had problems with anemia or been told to take iron pills? No   11. Have you had any abnormal blood loss such as black, tarry or bloody stools, or abnormal vaginal bleeding? No   12. Have you ever had a blood transfusion? No   13. Have you or any of your relatives ever had problems with anesthesia? No    14. Do you have sleep apnea, excessive snoring or daytime drowsiness? No   15. Do you have any prosthetic heart valves? No   16. Do you have prosthetic joints? No   17. Is there any chance that you may be pregnant? No         HPI:     HPI related to upcoming procedure: Patient with ongoing symptomatology related to uterine prolapse and cystocele; mixed stress and urge urinary incontinence.  Urogynecology consultation suggested vaginal hysterectomy-see consult.      See problem list for active medical problems.  Problems all longstanding and stable, except as noted/documented.  See ROS for pertinent symptoms related to these conditions.                                                                                                                                                          .    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     Priority: Medium     CARDIOVASCULAR SCREENING; LDL GOAL LESS THAN 160 10/31/2010     Priority: Medium      Past Medical History:   Diagnosis Date     Abnormal CAT scan 03/28/2017    bilateral duplicate renal     Headache 9/4/2007     Low ferritin 11/2016    3rd pregnancy     Migraines age 17    migraine/sinus headaches, better with excedrin     Plantar fasciitis 2/25/2009    See podiatry consult     Past Surgical History:   Procedure Laterality Date     CYSTOSCOPY, INJECT VESICOURETERAL REFLUX GEL N/A 6/7/2017    Procedure: CYSTOSCOPY, INJECT VESICOURETERAL REFLUX GEL;  CYSTOSCOPY WITH SUBMUCOSAL INJECTION OF DEFLUX ;  Surgeon: Jeferson White MD;  Location:  OR     CYSTOSCOPY, RETROGRADES,  COMBINED Bilateral 4/19/2017    Procedure: COMBINED CYSTOSCOPY, RETROGRADES;  CYSTOSCOPY, LEFT URETEROSCOPY, LEFT STENT PLACEMENT & RETRIEVAL, BILATERAL RETROGRADES;  Surgeon: Jeferson White MD;  Location:  OR     CYSTOSCOPY, URETEROSCOPY, COMBINED Left 4/19/2017    Procedure: COMBINED CYSTOSCOPY, URETEROSCOPY;;  Surgeon: Jeferson White MD;  Location:  OR     HC UGI ENDOSCOPY, SIMPLE EXAM  09/11/2007     HERNIA REPAIR, INGUINAL RT/LT      bilateral as a baby     INSERT INTRAUTERINE DEVICE  6/2009    Mirena IUD under anesthetic     MOUTH SURGERY      [place] teeth     PELVIS LAPAROSCOPY,DX  6/2009    normal     Current Outpatient Prescriptions   Medication Sig Dispense Refill     mupirocin (BACTROBAN) 2 % ointment Apply 1 each topically 3 times daily       OTC products: None, except as noted above    Allergies   Allergen Reactions     Amoxicillin Rash     Penicillins Rash      Latex Allergy: NO    Social History   Substance Use Topics     Smoking status: Never Smoker     Smokeless tobacco: Never Used     Alcohol use 0.0 oz/week     0 Standard drinks or equivalent per week      Comment: wine on occassion but not after12/6/11     History   Drug Use No       REVIEW OF SYSTEMS:   Constitutional, neuro, ENT, endocrine, pulmonary, cardiac, gastrointestinal, genitourinary, musculoskeletal, integument and psychiatric systems are negative, except as otherwise noted.    EXAM:   LMP 06/26/2018    GENERAL APPEARANCE: healthy, alert and no distress     EYES: EOMI, PERRL     HENT: ear canals and TM's normal and nose and mouth without ulcers or lesions     NECK: no adenopathy, no asymmetry, masses, or scars and thyroid normal to palpation     RESP: lungs clear to auscultation - no rales, rhonchi or wheezes     CV: regular rates and rhythm, normal S1 S2, no S3 or S4 and no murmur, click or rub     ABDOMEN:  soft, nontender, no HSM or masses and bowel sounds normal     MS: extremities normal- no gross deformities  noted, no evidence of inflammation in joints, FROM in all extremities.     SKIN: no suspicious lesions or rashes     NEURO: Normal strength and tone, sensory exam grossly normal, mentation intact and speech normal     PSYCH: mentation appears normal. and affect normal/bright     LYMPHATICS: No cervical adenopathy    DIAGNOSTICS:   I discussed with the patient that typically for these types of surgeries a quantitative hCG is done either the day of or the day before surgery.  I am happy to order that through our lab here at [facility], but I would like her to call to her specialty office to see what they would prefer and if there are other lab studies that will need to be done such as a type and cross.  Recent CBC was normal with a hemoglobin of 14.3.    Recent Labs   Lab Test  04/18/17   1029  03/17/17   1820   11/01/15   2105 10/19/14   HGB  14.3  13.7   < >  14.5   --    PLT  236  297   < >  207   --    INR   --    --    --    --   0.66   NA   --   142   --   139   --    POTASSIUM   --   4.1   --   3.8   --    CR   --   0.94   --   0.87   --     < > = values in this interval not displayed.        IMPRESSION:   Reason for surgery/procedure: Uterine prolapse; cystocele; mixed stress and urge urinary incontinence  Diagnosis/reason for consult: Preop PE    The proposed surgical procedure is considered INTERMEDIATE risk.    REVISED CARDIAC RISK INDEX  The patient has the following serious cardiovascular risks for perioperative complications such as (MI, PE, VFib and 3  AV Block):  No serious cardiac risks  INTERPRETATION: 0 risks: Class I (very low risk - 0.4% complication rate)    The patient has the following additional risks for perioperative complications:  No identified additional risks      ICD-10-CM    1. Preop general physical exam Z01.818        RECOMMENDATIONS:         --Patient is to take all scheduled medications on the day of surgery EXCEPT for modifications listed below.    APPROVAL GIVEN to proceed with  proposed procedure, without further diagnostic evaluation       Signed Electronically by: Sona Borja PA-C    Copy of this evaluation report is provided to requesting physician.    [facility] Preop Guidelines    Revised Cardiac Risk Index

## 2018-07-23 NOTE — NURSING NOTE
"Chief Complaint   Patient presents with     Pre-Op Exam       Initial /70  Pulse 90  Temp 96.9  F (36.1  C) (Temporal)  Resp 18  Wt 173 lb (78.5 kg)  LMP 06/22/2018 (Exact Date)  SpO2 98%  BMI 27.09 kg/m2 Estimated body mass index is 27.09 kg/(m^2) as calculated from the following:    Height as of 7/18/18: 5' 7.01\" (1.702 m).    Weight as of this encounter: 173 lb (78.5 kg).  BP completed using cuff size: robin Peralta MA      "

## 2018-07-29 ENCOUNTER — ANESTHESIA EVENT (OUTPATIENT)
Dept: SURGERY | Facility: AMBULATORY SURGERY CENTER | Age: 33
End: 2018-07-29

## 2018-07-30 ENCOUNTER — SURGERY (OUTPATIENT)
Age: 33
End: 2018-07-30

## 2018-07-30 ENCOUNTER — ANESTHESIA (OUTPATIENT)
Dept: SURGERY | Facility: AMBULATORY SURGERY CENTER | Age: 33
End: 2018-07-30

## 2018-07-30 ENCOUNTER — HOSPITAL ENCOUNTER (OUTPATIENT)
Facility: AMBULATORY SURGERY CENTER | Age: 33
End: 2018-07-30
Attending: OBSTETRICS & GYNECOLOGY
Payer: COMMERCIAL

## 2018-07-30 VITALS
DIASTOLIC BLOOD PRESSURE: 63 MMHG | HEIGHT: 67 IN | RESPIRATION RATE: 14 BRPM | OXYGEN SATURATION: 99 % | BODY MASS INDEX: 26.81 KG/M2 | SYSTOLIC BLOOD PRESSURE: 118 MMHG | TEMPERATURE: 98.4 F | WEIGHT: 170.79 LBS

## 2018-07-30 DIAGNOSIS — Z90.710 S/P VAGINAL HYSTERECTOMY: Primary | ICD-10-CM

## 2018-07-30 LAB — B-HCG SERPL-ACNC: <1 IU/L (ref 0–5)

## 2018-07-30 RX ORDER — AMOXICILLIN 250 MG
1-2 CAPSULE ORAL 2 TIMES DAILY
Qty: 30 TABLET | Refills: 0 | Status: SHIPPED | OUTPATIENT
Start: 2018-07-30 | End: 2018-08-16

## 2018-07-30 RX ORDER — PHENAZOPYRIDINE HYDROCHLORIDE 100 MG/1
200 TABLET, FILM COATED ORAL ONCE
Status: COMPLETED | OUTPATIENT
Start: 2018-07-30 | End: 2018-07-30

## 2018-07-30 RX ORDER — BUPIVACAINE HYDROCHLORIDE AND EPINEPHRINE 2.5; 5 MG/ML; UG/ML
INJECTION, SOLUTION INFILTRATION; PERINEURAL PRN
Status: DISCONTINUED | OUTPATIENT
Start: 2018-07-30 | End: 2018-07-30 | Stop reason: HOSPADM

## 2018-07-30 RX ORDER — FENTANYL CITRATE 50 UG/ML
25-50 INJECTION, SOLUTION INTRAMUSCULAR; INTRAVENOUS
Status: DISCONTINUED | OUTPATIENT
Start: 2018-07-30 | End: 2018-07-30 | Stop reason: HOSPADM

## 2018-07-30 RX ORDER — IBUPROFEN 200 MG
600 TABLET ORAL
Status: DISCONTINUED | OUTPATIENT
Start: 2018-07-30 | End: 2018-07-31 | Stop reason: HOSPADM

## 2018-07-30 RX ORDER — KETOROLAC TROMETHAMINE 30 MG/ML
30 INJECTION, SOLUTION INTRAMUSCULAR; INTRAVENOUS EVERY 6 HOURS PRN
Status: DISCONTINUED | OUTPATIENT
Start: 2018-07-30 | End: 2018-07-31 | Stop reason: HOSPADM

## 2018-07-30 RX ORDER — NALOXONE HYDROCHLORIDE 0.4 MG/ML
.1-.4 INJECTION, SOLUTION INTRAMUSCULAR; INTRAVENOUS; SUBCUTANEOUS
Status: DISCONTINUED | OUTPATIENT
Start: 2018-07-30 | End: 2018-07-31 | Stop reason: HOSPADM

## 2018-07-30 RX ORDER — PROPOFOL 10 MG/ML
INJECTION, EMULSION INTRAVENOUS PRN
Status: DISCONTINUED | OUTPATIENT
Start: 2018-07-30 | End: 2018-07-30

## 2018-07-30 RX ORDER — ACETAMINOPHEN 325 MG/1
975 TABLET ORAL ONCE
Status: COMPLETED | OUTPATIENT
Start: 2018-07-30 | End: 2018-07-30

## 2018-07-30 RX ORDER — MEPERIDINE HYDROCHLORIDE 25 MG/ML
12.5 INJECTION INTRAMUSCULAR; INTRAVENOUS; SUBCUTANEOUS
Status: DISCONTINUED | OUTPATIENT
Start: 2018-07-30 | End: 2018-07-31 | Stop reason: HOSPADM

## 2018-07-30 RX ORDER — IBUPROFEN 600 MG/1
600 TABLET, FILM COATED ORAL EVERY 6 HOURS PRN
Qty: 30 TABLET | Refills: 0 | Status: SHIPPED | OUTPATIENT
Start: 2018-07-30 | End: 2018-09-27

## 2018-07-30 RX ORDER — LIDOCAINE 40 MG/G
CREAM TOPICAL
Status: DISCONTINUED | OUTPATIENT
Start: 2018-07-30 | End: 2018-07-30 | Stop reason: HOSPADM

## 2018-07-30 RX ORDER — ONDANSETRON 2 MG/ML
INJECTION INTRAMUSCULAR; INTRAVENOUS PRN
Status: DISCONTINUED | OUTPATIENT
Start: 2018-07-30 | End: 2018-07-30

## 2018-07-30 RX ORDER — OXYCODONE HYDROCHLORIDE 5 MG/1
5 TABLET ORAL EVERY 4 HOURS PRN
Status: DISCONTINUED | OUTPATIENT
Start: 2018-07-30 | End: 2018-07-31 | Stop reason: HOSPADM

## 2018-07-30 RX ORDER — ONDANSETRON 4 MG/1
4 TABLET, ORALLY DISINTEGRATING ORAL EVERY 30 MIN PRN
Status: DISCONTINUED | OUTPATIENT
Start: 2018-07-30 | End: 2018-07-31 | Stop reason: HOSPADM

## 2018-07-30 RX ORDER — SODIUM CHLORIDE, SODIUM LACTATE, POTASSIUM CHLORIDE, CALCIUM CHLORIDE 600; 310; 30; 20 MG/100ML; MG/100ML; MG/100ML; MG/100ML
INJECTION, SOLUTION INTRAVENOUS CONTINUOUS
Status: DISCONTINUED | OUTPATIENT
Start: 2018-07-30 | End: 2018-07-30 | Stop reason: HOSPADM

## 2018-07-30 RX ORDER — LIDOCAINE HYDROCHLORIDE 20 MG/ML
INJECTION, SOLUTION INFILTRATION; PERINEURAL PRN
Status: DISCONTINUED | OUTPATIENT
Start: 2018-07-30 | End: 2018-07-30

## 2018-07-30 RX ORDER — PROPOFOL 10 MG/ML
INJECTION, EMULSION INTRAVENOUS CONTINUOUS PRN
Status: DISCONTINUED | OUTPATIENT
Start: 2018-07-30 | End: 2018-07-30

## 2018-07-30 RX ORDER — ONDANSETRON 2 MG/ML
4 INJECTION INTRAMUSCULAR; INTRAVENOUS EVERY 30 MIN PRN
Status: DISCONTINUED | OUTPATIENT
Start: 2018-07-30 | End: 2018-07-31 | Stop reason: HOSPADM

## 2018-07-30 RX ORDER — OXYCODONE AND ACETAMINOPHEN 5; 325 MG/1; MG/1
1-2 TABLET ORAL EVERY 4 HOURS PRN
Qty: 15 TABLET | Refills: 0 | Status: SHIPPED | OUTPATIENT
Start: 2018-07-30 | End: 2018-07-30

## 2018-07-30 RX ORDER — OXYCODONE AND ACETAMINOPHEN 5; 325 MG/1; MG/1
1-2 TABLET ORAL EVERY 4 HOURS PRN
Qty: 10 TABLET | Refills: 0 | Status: SHIPPED | OUTPATIENT
Start: 2018-07-30 | End: 2018-08-16

## 2018-07-30 RX ORDER — CLINDAMYCIN PHOSPHATE 900 MG/50ML
900 INJECTION, SOLUTION INTRAVENOUS
Status: DISCONTINUED | OUTPATIENT
Start: 2018-07-30 | End: 2018-07-30 | Stop reason: HOSPADM

## 2018-07-30 RX ORDER — DEXAMETHASONE SODIUM PHOSPHATE 4 MG/ML
INJECTION, SOLUTION INTRA-ARTICULAR; INTRALESIONAL; INTRAMUSCULAR; INTRAVENOUS; SOFT TISSUE PRN
Status: DISCONTINUED | OUTPATIENT
Start: 2018-07-30 | End: 2018-07-30

## 2018-07-30 RX ORDER — OXYCODONE AND ACETAMINOPHEN 5; 325 MG/1; MG/1
1 TABLET ORAL
Status: DISCONTINUED | OUTPATIENT
Start: 2018-07-30 | End: 2018-07-31 | Stop reason: HOSPADM

## 2018-07-30 RX ORDER — GABAPENTIN 300 MG/1
300 CAPSULE ORAL ONCE
Status: COMPLETED | OUTPATIENT
Start: 2018-07-30 | End: 2018-07-30

## 2018-07-30 RX ORDER — FENTANYL CITRATE 50 UG/ML
INJECTION, SOLUTION INTRAMUSCULAR; INTRAVENOUS PRN
Status: DISCONTINUED | OUTPATIENT
Start: 2018-07-30 | End: 2018-07-30

## 2018-07-30 RX ORDER — SODIUM CHLORIDE, SODIUM LACTATE, POTASSIUM CHLORIDE, CALCIUM CHLORIDE 600; 310; 30; 20 MG/100ML; MG/100ML; MG/100ML; MG/100ML
INJECTION, SOLUTION INTRAVENOUS CONTINUOUS
Status: DISCONTINUED | OUTPATIENT
Start: 2018-07-30 | End: 2018-07-31 | Stop reason: HOSPADM

## 2018-07-30 RX ORDER — CLINDAMYCIN PHOSPHATE 900 MG/50ML
900 INJECTION, SOLUTION INTRAVENOUS SEE ADMIN INSTRUCTIONS
Status: DISCONTINUED | OUTPATIENT
Start: 2018-07-30 | End: 2018-07-30 | Stop reason: HOSPADM

## 2018-07-30 RX ADMIN — DEXAMETHASONE SODIUM PHOSPHATE 4 MG: 4 INJECTION, SOLUTION INTRA-ARTICULAR; INTRALESIONAL; INTRAMUSCULAR; INTRAVENOUS; SOFT TISSUE at 08:56

## 2018-07-30 RX ADMIN — GABAPENTIN 300 MG: 300 CAPSULE ORAL at 07:11

## 2018-07-30 RX ADMIN — FENTANYL CITRATE 25 MCG: 50 INJECTION, SOLUTION INTRAMUSCULAR; INTRAVENOUS at 11:41

## 2018-07-30 RX ADMIN — LIDOCAINE HYDROCHLORIDE 100 MG: 20 INJECTION, SOLUTION INFILTRATION; PERINEURAL at 08:56

## 2018-07-30 RX ADMIN — SODIUM CHLORIDE, SODIUM LACTATE, POTASSIUM CHLORIDE, CALCIUM CHLORIDE: 600; 310; 30; 20 INJECTION, SOLUTION INTRAVENOUS at 08:48

## 2018-07-30 RX ADMIN — ONDANSETRON 4 MG: 2 INJECTION INTRAMUSCULAR; INTRAVENOUS at 08:56

## 2018-07-30 RX ADMIN — PROPOFOL 250 MG: 10 INJECTION, EMULSION INTRAVENOUS at 08:56

## 2018-07-30 RX ADMIN — PROPOFOL 100 MCG/KG/MIN: 10 INJECTION, EMULSION INTRAVENOUS at 08:56

## 2018-07-30 RX ADMIN — ONDANSETRON 4 MG: 2 INJECTION INTRAMUSCULAR; INTRAVENOUS at 11:35

## 2018-07-30 RX ADMIN — FENTANYL CITRATE 25 MCG: 50 INJECTION, SOLUTION INTRAMUSCULAR; INTRAVENOUS at 11:53

## 2018-07-30 RX ADMIN — FENTANYL CITRATE 25 MCG: 50 INJECTION, SOLUTION INTRAMUSCULAR; INTRAVENOUS at 11:36

## 2018-07-30 RX ADMIN — PHENAZOPYRIDINE HYDROCHLORIDE 200 MG: 100 TABLET, FILM COATED ORAL at 07:12

## 2018-07-30 RX ADMIN — ACETAMINOPHEN 975 MG: 325 TABLET ORAL at 07:11

## 2018-07-30 RX ADMIN — FENTANYL CITRATE 25 MCG: 50 INJECTION, SOLUTION INTRAMUSCULAR; INTRAVENOUS at 11:45

## 2018-07-30 RX ADMIN — Medication 1 ML: at 07:28

## 2018-07-30 RX ADMIN — OXYCODONE HYDROCHLORIDE 5 MG: 5 TABLET ORAL at 11:42

## 2018-07-30 RX ADMIN — FENTANYL CITRATE 50 MCG: 50 INJECTION, SOLUTION INTRAMUSCULAR; INTRAVENOUS at 09:31

## 2018-07-30 RX ADMIN — Medication 0.5 MG: at 10:01

## 2018-07-30 RX ADMIN — CLINDAMYCIN PHOSPHATE 900 MG: 900 INJECTION, SOLUTION INTRAVENOUS at 09:10

## 2018-07-30 RX ADMIN — BUPIVACAINE HYDROCHLORIDE AND EPINEPHRINE 10 ML: 2.5; 5 INJECTION, SOLUTION INFILTRATION; PERINEURAL at 09:18

## 2018-07-30 RX ADMIN — FENTANYL CITRATE 50 MCG: 50 INJECTION, SOLUTION INTRAMUSCULAR; INTRAVENOUS at 08:51

## 2018-07-30 NOTE — DISCHARGE INSTRUCTIONS
[facility]  Home Care Following Anesthesia  For 24 hours after surgery:  1. Get plenty of rest.  A responsible adult must stay with you for at least 24 hours after you leave the surgery center.  2. Do not drive or use heavy equipment.  If you have weakness or tingling, don't drive or use heavy equipment until this feeling goes away.   3. Do not drink alcohol.   4. Avoid strenuous or risky activities.  Ask for help when climbing stairs.  5. You may feel lightheaded.  IF so, sit for a few minutes before standing.  Have someone help you get up.   6. If you have nausea (feel sick to your stomach): Drink only clear liquids such as apple juice, ginger ale, broth or 7-Up.  Rest may also help.  Be sure to drink enough fluids.  Move to a regular diet as you feel able.   7. You may have a slight fever.  Call the doctor if your fever is over 100 F (37.7 C) (taken under the tongue) or lasts longer than 24 hours.  8. You may have a dry mouth, a sore throat, muscle aches or trouble sleeping. These should go away after 24 hours.  9. Do not make important or legal decisions.               Tips for taking pain medications  To get the best pain relief possible, remember these points:    Take pain medications as directed, before pain becomes severe.    Pain medication can upset your stomach: taking it with food may help.    Constipation is a common side effect of pain medication. Drink plenty of  fluids.    Eat foods high in fiber. Take a stool softener if recommended by your doctor or pharmacist.    Do not drink alcohol, drive or operate machinery while taking pain medications.    Ask about other ways to control pain, such as with heat, ice or relaxation.    Tylenol/Acetaminophen Consumption  To help encourage the safe use of acetaminophen, the makers of TYLENOL  have lowered the maximum daily dose for single-ingredient Extra Strength TYLENOL  (acetaminophen) products sold in the [place] from 8  pills per day (4,000 mg) to 6 pills per day (3,000 mg). The dosing interval has also changed from 2 pills every 4-6 hours to 2 pills every 6 hours.    If you feel your pain relief is insufficient, you may take Tylenol/Acetaminophen in addition to your narcotic pain medication.     Be careful not to exceed 3,000 mg of Tylenol/Acetaminophen in a 24 hour period from all sources.    If you are taking extra strength Tylenol/acetaminophen (500 mg), the maximum dose is 6 tablets in 24 hours.    If you are taking regular strength acetaminophen (325 mg), the maximum dose is 9 tablets in 24 hours.    Call a doctor for any of the followin. Signs of infection (fever, growing tenderness at the surgery site, a large amount of drainage or bleeding, severe pain, foul-smelling drainage, redness, swelling).  2. It has been over 8 to 10 hours since surgery and you are still not able to urinate (pass water).  3. Headache for over 24 hours.  Your doctor is:  Dr. Ed Noriega, Gynecologic Oncology: [phone]  Or dial [phone] and ask for the resident on call for:  Gynecologic Oncology  For emergency care, call the:  [facility]:  [phone] (TTY for hearing impaired: [phone])

## 2018-07-30 NOTE — IP AVS SNAPSHOT
[facility]    [address] [place] [address]    Phone:  [phone]    Fax:  [phone]                                       After Visit Summary   7/30/2018    Paulette Singer    MRN: [id number]           After Visit Summary Signature Page     I have received my discharge instructions, and my questions have been answered. I have discussed any challenges I see with this plan with the nurse or doctor.    ..........................................................................................................................................  Patient/Patient Representative Signature      ..........................................................................................................................................  Patient Representative Print Name and Relationship to Patient    ..................................................               ................................................  Date                                            Time    ..........................................................................................................................................  Reviewed by Signature/Title    ...................................................              ..............................................  Date                                                            Time

## 2018-07-30 NOTE — BRIEF OP NOTE
Brief Operative Note   Name: Paulette Singer  MRN: [id number]  : 1985  Date of Surgery: 2018    Pre-operative Diagnosis:      Uterine prolapse  Dyspareunia  Urinary incontinence  Duplicated right proximal ureter, ectopic left upper renal pole    Post-operative Diagnosis: Same  Procedure(s): Total vaginal hysterectomy, bilateral salpingectomy, uterosacral ligament suspension    Surgeon: Ed Noriega MD   Assistants: Radha Francois MD PGY4; Nakia Stewart MD PGY1, Aaron Grullon MS4    Anesthesia: GETA  EBL: 200 mL   Urine Output: not recorded  Fluids: 700 mL crystalloid    Specimens: Uterus, cervix, bilateral fallopian tubes  Complications: None apparent.  Findings: Normal appearing external genitalia, vagina and parous cervix. Initially, anterior vaginal prolapse that improved following procedure. Uterus 8 week sized, no apparent abnormalities or fibroids. Fallopian tubes accessible and appeared normal. On cystoscopy, intact bladder dome and bilateral ureteral jets even with tension on uterosacral ligament stitches. Distal to the left ureter, there was a divet in the bladder mucosa near the urethral opening suggestive of a duplicate ureteral opening. No efflux was seen.     Radha Francois, PGY4  OB/Gyn  2018, 7:39 AM

## 2018-07-30 NOTE — ANESTHESIA POSTPROCEDURE EVALUATION
Patient: Paulette Singer    Procedure(s):  Total Vaginal Hysterectomy, Bilateral salpingectomy - Wound Class: II-Clean Contaminated   - Wound Class: II-Clean Contaminated    Diagnosis:Stress Urinary Incotinence, Uterine Prolapse   Diagnosis Additional Information: No value filed.    Anesthesia Type:  General    Note:  Anesthesia Post Evaluation    Patient location during evaluation: bedside  Patient participation: Able to fully participate in evaluation  Level of consciousness: awake  Pain management: adequate  Airway patency: patent  Cardiovascular status: acceptable  Respiratory status: acceptable  Hydration status: acceptable  PONV: controlled     Anesthetic complications: None          Last vitals:  Vitals:    07/30/18 1145 07/30/18 1200 07/30/18 1230   BP: 121/70 118/67 103/57   Resp: 14 14 14   Temp:  36.7  C (98  F)    SpO2: 100% 100% 98%         Electronically Signed By: Edmond Skelton MD, MD  July 30, 2018  12:50 PM

## 2018-07-30 NOTE — ANESTHESIA CARE TRANSFER NOTE
Patient: Paulette Singer    Procedure(s):  Total Vaginal Hysterectomy, Bilateral salpingectomy - Wound Class: II-Clean Contaminated   - Wound Class: II-Clean Contaminated    Diagnosis: Stress Urinary Incotinence, Uterine Prolapse   Diagnosis Additional Information: No value filed.    Anesthesia Type:   General     Note:  Airway :Room Air  Patient transferred to:Phase II  Comments: Patient awake and breathing spont. VSS. No complaints of pain or nausea. Report to RnHandoff Report: Identifed the Patient, Identified the Reponsible Provider, Reviewed the pertinent medical history, Discussed the surgical course, Reviewed Intra-OP anesthesia mangement and issues during anesthesia, Set expectations for post-procedure period and Allowed opportunity for questions and acknowledgement of understanding      Vitals: (Last set prior to Anesthesia Care Transfer)    CRNA VITALS  7/30/2018 1043 - 7/30/2018 1121      7/30/2018             Pulse: 70    SpO2: 99 %    Resp Rate (observed): 18                Electronically Signed By: JAMEY Stephen CRNA  July 30, 2018  11:21 AM

## 2018-07-30 NOTE — ANESTHESIA PREPROCEDURE EVALUATION
Anesthesia Evaluation     . Pt has had prior anesthetic. Type: General    No history of anesthetic complications          ROS/MED HX    ENT/Pulmonary:  - neg pulmonary ROS     Neurologic:  - neg neurologic ROS     Cardiovascular:  - neg cardiovascular ROS       METS/Exercise Tolerance:  4 - Raking leaves, gardening   Hematologic:  - neg hematologic  ROS       Musculoskeletal:  - neg musculoskeletal ROS       GI/Hepatic:  - neg GI/hepatic ROS       Renal/Genitourinary:  - ROS Renal section negative       Endo:  - neg endo ROS       Psychiatric:  - neg psychiatric ROS       Infectious Disease:  - neg infectious disease ROS       Malignancy:         Other:                     Physical Exam  Normal systems: dental    Airway   Mallampati: II  TM distance: >3 FB  Neck ROM: full    Dental     Cardiovascular   Rhythm and rate: regular and normal      Pulmonary    breath sounds clear to auscultation                    Anesthesia Plan      History & Physical Review  History and physical reviewed and following examination; no interval change.    ASA Status:  1 .    NPO Status:  > 6 hours    Plan for General and LMA with Intravenous induction. Maintenance will be TIVA.    PONV prophylaxis:  Ondansetron (or other 5HT-3) and Dexamethasone or Solumedrol       Postoperative Care  Postoperative pain management:  Oral pain medications and Multi-modal analgesia.      Consents  Anesthetic plan, risks, benefits and alternatives discussed with:  Patient..                          .

## 2018-07-30 NOTE — OP NOTE
Operative note  Name: Paulette Singer  MRN: [id number]  : 1985  Date of Surgery: 2018     Pre-operative Diagnosis:      Uterine prolapse  Dyspareunia  Duplicated right proximal ureter, ectopic left upper renal pole     Post-operative Diagnosis: Same  Procedure(s): Total vaginal hysterectomy, bilateral salpingectomy, uterosacral ligament suspension     Surgeon: Ed Noriega MD   Assistants: Radha Francois MD PGY4; Nakia Stewart MD PGY1, Aaron Grullon MS4     Anesthesia: GETA  EBL: 200 mL   Urine Output: not recorded  Fluids: 700 mL crystalloid     Specimens: Uterus, cervix, bilateral fallopian tubes    CONDITION: Good    COMPLICATIONS: None noted    IMPLANTS: None    MEDICATIONS: Gentamycin and clindamycin prior to the start of the procedure    DRAINS, PACKS: None    INDICATIONS: The patient is a 33 year old female with dyspareunia and uterine proplapse who requests surgical intervention.  Prior to the procedure, she was counseled as to the treatment options and their risks/benefits.  She was desirous of surgical intervention consisting of the procedures mentioned above.  She was counseled on the risks/benefits/alternatives/indications of the procedure and written, informed consent was obtained prior to proceeding to the OR.     Findings: Normal appearing external genitalia, vagina and parous cervix. Initially, anterior vaginal prolapse that improved following procedure. Uterus 8 week sized, no apparent abnormalities or fibroids. Fallopian tubes accessible and appeared normal. On cystoscopy, intact bladder dome and bilateral ureteral jets even with tension on uterosacral ligament stitches. Distal to the left ureter, there was a divet in the bladder mucosa near the urethral opening suggestive of a duplicate ureteral opening. No efflux was seen.       OPERATIVE PROCEDURE:    The patient was taken to the operating room where general endotracheal anesthesia was found to be adequate.  She was  positioned in dorsal lithotomy position using erna stirrups. She was then prepped and draped in the normal sterile fashion.      A weighted speculum was then placed inside the vagina, and two single toothed tenaculums were used to grasp the cervix.      Marcaine 0.25% with epinephrine was then injected just below the vaginal mucosa around the planned colpotomy site. Once this was complete, the cervix was incised in a circular fashion using a scalpel.  The posterior cul-de-sac was then identified and entered sharply with Shaw scissors. A weighted speculum was then placed inside the patient s vagina and placed in such a way that it protected the patient s rectum. Attention was then turned to the patient s anterior cul-de-sac, where the bladder reflection was identified, lifted, and an attempt was made to enter sharply; this was not successful, so we turned our attention to the posterior cul-de-sac.     The uterine arteries and cardinal ligament complexes were then serially clamped with Faith clamps, cut with Shaw scissors, and tied with 2-0 Vicryl. The utero-ovarian ligaments were then easily identified and clamped with Faith clamps, cut, and doubly tied with 2-0 Vicryl.  The specimen was passed off the field.   Once the uterus was removed, the right fallopian easily identified and amputated with cautery. The same procedure was then performed on the left fallopian tube without difficulty. Good hemostasis was noted along both pedicle lines.    Next, the uterosacral ligaments were palpated on either side. Two sutures of Goretex were placed through the ligament on either side.     Cystoscopy was then performed using the 30 degree cystoscope and sterile saline as a distending media. The bladder was thoroughly examined and noted to be free of injury. Other findings as above. The patient had been given pyridium prior to going to the OR. Brisk efflux of orange stained urine was noted from the ureteral orifices  bilaterally.    On each side the Goretex was then sutured into the internal vaginal cuff, one stitch on the posterior cuff on and one on the anterior cuff on each side. The vaginal cuff was closed with running locked 0-Vicryl. Following this, the Goretex suture was tied down, elevating the vaginal cuff towards the uterosacral ligaments. The cuff was hemostatic at this time and intact to palpation.     Radha Francois, PGY4  OB/Gyn  7/30/2018, 11:31 AM    I attest that I was scrubbed and present for the entire procedure and agree with the operative note above.    Ed Noriega MD  Professor, OB/GYN  Urogynecologist        CC  Patient Care Team:  Sona Borja PA-C as PCP - General  Ed Noriega MD as MD (OB/Gyn)

## 2018-07-30 NOTE — IP AVS SNAPSHOT
MRN:[id number]                      After Visit Summary   7/30/2018    Paulette Singer    MRN: [id number]           Thank you!     Thank you for choosing [facility] for your care. Our goal is always to provide you with excellent care. Hearing back from our patients is one way we can continue to improve our services. Please take a few minutes to complete the written survey that you may receive in the mail after you visit with us. Thank you!        Patient Information     Date Of Birth          1985        About your hospital stay     You were admitted on:  July 30, 2018 [facility]    You were discharged on:  July 30, 2018       Who to Call     For medical emergencies, please call 911.  For non-urgent questions about your medical care, please call your primary care provider or clinic, [phone]  For questions related to your surgery, please call your surgery clinic        Attending Provider     Provider Specialty    Ed Noriega MD OB/Gyn       Primary Care Provider Office Phone # Fax #    Sona Borja PA-C [phone]      After Care Instructions     Discharge Instructions       Pelvic Rest. No tampons, douching or intercourse for  6  weeks.            Discharge Instructions       Follow up appt made for 8/16/2018            Discharge Instructions       - You can shower or bathe starting the day of surgery. No soaking in hot tubs or pools  - Regular diet, lots of fluids to prevent constipation  - No sex or anything in your vagina for 6 weeks or per instructions with Dr. Noriega at follow up  - No heavy lifting more than 15 pounds or vigorous exercise (yoga, pilates, running) for 6 weeks  - Come to the ED ([facility]) if you have heavy vaginal bleeding soaking a pad in an hour, if you have a gush of clear fluid, or if you have a bulge or something coming from your vagina  - Come to the ED if you haven't been able  to void for 8 hours  - Call clinic if you have persistent bleeding, like a period, for 1-2 hours after the first day.   - Call clinic during office hours or come to the ED after hours with a fever of 100.4 or more            No alcohol       NO ALCOHOL for 24 hours post procedure            No driving or operating machinery       No driving or operating machinery until day after procedure                  Your next 10 appointments already scheduled     Aug 16, 2018 10:00 AM CDT   Return Urogyn with Ed Noriega MD   [facility] ([facility])    [facility]  [address]  [facility] [address] [phone]           Please call [facility] , [phone], with any questions or concerns you may have regarding your appointment              Further instructions from your care team       [facility]  Home Care Following Anesthesia  For 24 hours after surgery:  1. Get plenty of rest.  A responsible adult must stay with you for at least 24 hours after you leave the surgery center.  2. Do not drive or use heavy equipment.  If you have weakness or tingling, don't drive or use heavy equipment until this feeling goes away.   3. Do not drink alcohol.   4. Avoid strenuous or risky activities.  Ask for help when climbing stairs.  5. You may feel lightheaded.  IF so, sit for a few minutes before standing.  Have someone help you get up.   6. If you have nausea (feel sick to your stomach): Drink only clear liquids such as apple juice, ginger ale, broth or 7-Up.  Rest may also help.  Be sure to drink enough fluids.  Move to a regular diet as you feel able.   7. You may have a slight fever.  Call the doctor if your fever is over 100 F (37.7 C) (taken under the tongue) or lasts longer than 24 hours.  8. You may have a dry mouth, a sore throat, muscle aches or trouble sleeping. These should go away after 24  hours.  9. Do not make important or legal decisions.               Tips for taking pain medications  To get the best pain relief possible, remember these points:    Take pain medications as directed, before pain becomes severe.    Pain medication can upset your stomach: taking it with food may help.    Constipation is a common side effect of pain medication. Drink plenty of  fluids.    Eat foods high in fiber. Take a stool softener if recommended by your doctor or pharmacist.    Do not drink alcohol, drive or operate machinery while taking pain medications.    Ask about other ways to control pain, such as with heat, ice or relaxation.    Tylenol/Acetaminophen Consumption  To help encourage the safe use of acetaminophen, the makers of TYLENOL  have lowered the maximum daily dose for single-ingredient Extra Strength TYLENOL  (acetaminophen) products sold in the [place] from 8 pills per day (4,000 mg) to 6 pills per day (3,000 mg). The dosing interval has also changed from 2 pills every 4-6 hours to 2 pills every 6 hours.    If you feel your pain relief is insufficient, you may take Tylenol/Acetaminophen in addition to your narcotic pain medication.     Be careful not to exceed 3,000 mg of Tylenol/Acetaminophen in a 24 hour period from all sources.    If you are taking extra strength Tylenol/acetaminophen (500 mg), the maximum dose is 6 tablets in 24 hours.    If you are taking regular strength acetaminophen (325 mg), the maximum dose is 9 tablets in 24 hours.    Call a doctor for any of the followin. Signs of infection (fever, growing tenderness at the surgery site, a large amount of drainage or bleeding, severe pain, foul-smelling drainage, redness, swelling).  2. It has been over 8 to 10 hours since surgery and you are still not able to urinate (pass water).  3. Headache for over 24 hours.  Your doctor is:  Dr. Ed Noriega, Gynecologic Oncology: [phone]  Or dial [phone] and ask for the resident on call  "for:  Gynecologic Oncology  For emergency care, call the:  [facility]:  [phone] (TTY for hearing impaired: [phone])    Pending Results     Date and Time Order Name Status Description    7/30/2018 1029 Surgical pathology exam In process             Admission Information     Date & Time Provider Department Dept. Phone    7/30/2018 Ed Noriega MD [facility] [phone]      Your Vitals Were     Blood Pressure Temperature Respirations Height Weight Pulse Oximetry    103/57 98  F (36.7  C) (Temporal) 14 1.7 m (5' 6.93\") 77.5 kg (170 lb 12.7 oz) 98%    BMI (Body Mass Index)                   26.81 kg/m2           [employer] Information     [employer] gives you secure access to your electronic health record. If you see a primary care provider, you can also send messages to your care team and make appointments. If you have questions, please call your primary care clinic.  If you do not have a primary care provider, please call [phone] and they will assist you.      [employer] is an electronic gateway that provides easy, online access to your medical records. With [employer], you can request a clinic appointment, read your test results, renew a prescription or communicate with your care team.     To access your existing account, please contact your [facility] or call [phone] for assistance.        Care EveryWhere ID     This is your Care EveryWhere ID. This could be used by other organizations to access your [facility] medical records  [id number]        Equal Access to Services     ZEYAD MARTINEZ : Hadii salome juareso Somarianne, waaxda luqadaha, qaybta kaalmada adeegyada, waxpaul nicole sandoval . So [facility] [phone].    ATENCIÓN: Si habla español, tiene a william disposición servicios gratuitos de asistencia lingüística. Llame al [phone].    We comply with applicable federal civil rights laws and [place] laws. We do " not discriminate on the basis of race, color, national origin, age, disability, sex, sexual orientation, or gender identity.               Review of your medicines      UNREVIEWED medicines. Ask your doctor about these medicines        Dose / Directions    mupirocin 2 % ointment   Commonly known as:  BACTROBAN        Dose:  1 each   Apply 1 each topically 3 times daily   Refills:  0         START taking        Dose / Directions    ibuprofen 600 MG tablet   Commonly known as:  ADVIL/MOTRIN   Used for:  S/P vaginal hysterectomy        Dose:  600 mg   Take 1 tablet (600 mg) by mouth every 6 hours as needed for pain (mild)   Quantity:  30 tablet   Refills:  0       oxyCODONE-acetaminophen 5-325 MG per tablet   Commonly known as:  PERCOCET   Used for:  S/P vaginal hysterectomy        Dose:  1-2 tablet   Take 1-2 tablets by mouth every 4 hours as needed for pain (moderate to severe)   Quantity:  10 tablet   Refills:  0       senna-docusate 8.6-50 MG per tablet   Commonly known as:  SENOKOT-S;PERICOLACE   Used for:  S/P vaginal hysterectomy        Dose:  1-2 tablet   Take 1-2 tablets by mouth 2 times daily Take while on oral narcotics to prevent or treat constipation.   Quantity:  30 tablet   Refills:  0            Where to get your medicines      These medications were sent to [address] [address]    Hours:  TRANSPLANT PHONE NUMBER [phone] Phone:  [phone]     ibuprofen 600 MG tablet    senna-docusate 8.6-50 MG per tablet         Some of these will need a paper prescription and others can be bought over the counter. Ask your nurse if you have questions.     Bring a paper prescription for each of these medications     oxyCODONE-acetaminophen 5-325 MG per tablet                Protect others around you: Learn how to safely use, store and throw away your medicines at www.disposemymeds.org.         Information about OPIOIDS     PRESCRIPTION OPIOIDS: WHAT YOU NEED TO KNOW   We gave you an opioid (narcotic) pain medicine. It is important to manage your pain, but opioids are not always the best choice. You should first try all the other options your care team gave you. Take this medicine for as short a time (and as few doses) as possible.     These medicines have risks:    DO NOT drive when on new or higher doses of pain medicine. These medicines can affect your alertness and reaction times, and you could be arrested for driving under the influence (DUI). If you need to use opioids long-term, talk to your care team about driving.    DO NOT operate heave machinery    DO NOT do any other dangerous activities while taking these medicines.     DO NOT drink any alcohol while taking these medicines.      If the opioid prescribed includes acetaminophen, DO NOT take with any other medicines that contain acetaminophen. Read all labels carefully. Look for the word  acetaminophen  or  Tylenol.  Ask your pharmacist if you have questions or are unsure.    You can get addicted to pain medicines, especially if you have a history of addiction (chemical, alcohol or substance dependence). Talk to your care team about ways to reduce this risk.    Store your pills in a secure place, locked if possible. We will not replace any lost or stolen medicine. If you don t finish your medicine, please throw away (dispose) as directed by your pharmacist. The [place] Pollution Control Agency has more information about safe disposal: https://www.pca.[facility].mn.us/living-green/managing-unwanted-medications.     All opioids tend to cause constipation. Drink plenty of water and eat foods that have a lot of fiber, such as fruits, vegetables, prune juice, apple juice and high-fiber cereal. Take a laxative (Miralax, milk of magnesia, Colace, Senna) if you don t move your bowels at least every other day.              Medication List: This is a list  of all your medications and when to take them. Check marks below indicate your daily home schedule. Keep this list as a reference.      Medications           Morning Afternoon Evening Bedtime As Needed    ibuprofen 600 MG tablet   Commonly known as:  ADVIL/MOTRIN   Take 1 tablet (600 mg) by mouth every 6 hours as needed for pain (mild)                                mupirocin 2 % ointment   Commonly known as:  BACTROBAN   Apply 1 each topically 3 times daily                                oxyCODONE-acetaminophen 5-325 MG per tablet   Commonly known as:  PERCOCET   Take 1-2 tablets by mouth every 4 hours as needed for pain (moderate to severe)                                senna-docusate 8.6-50 MG per tablet   Commonly known as:  SENOKOT-S;PERICOLACE   Take 1-2 tablets by mouth 2 times daily Take while on oral narcotics to prevent or treat constipation.

## 2018-08-02 LAB — COPATH REPORT: NORMAL

## 2018-08-06 ENCOUNTER — TELEPHONE (OUTPATIENT)
Dept: OBGYN | Facility: CLINIC | Age: 33
End: 2018-08-06

## 2018-08-06 DIAGNOSIS — R30.0 DYSURIA: Primary | ICD-10-CM

## 2018-08-06 DIAGNOSIS — R30.0 DYSURIA: ICD-10-CM

## 2018-08-06 LAB
ALBUMIN UR-MCNC: NEGATIVE MG/DL
APPEARANCE UR: CLEAR
BILIRUB UR QL STRIP: NEGATIVE
COLOR UR AUTO: ABNORMAL
GLUCOSE UR STRIP-MCNC: NEGATIVE MG/DL
HGB UR QL STRIP: NEGATIVE
KETONES UR STRIP-MCNC: NEGATIVE MG/DL
LEUKOCYTE ESTERASE UR QL STRIP: NEGATIVE
MUCOUS THREADS #/AREA URNS LPF: PRESENT /LPF
NITRATE UR QL: NEGATIVE
PH UR STRIP: 7 PH (ref 5–7)
RBC #/AREA URNS AUTO: <1 /HPF (ref 0–2)
SOURCE: ABNORMAL
SP GR UR STRIP: 1 (ref 1–1.03)
SQUAMOUS #/AREA URNS AUTO: 1 /HPF (ref 0–1)
UROBILINOGEN UR STRIP-MCNC: 0 MG/DL (ref 0–2)
WBC #/AREA URNS AUTO: 1 /HPF (ref 0–5)

## 2018-08-06 PROCEDURE — 81001 URINALYSIS AUTO W/SCOPE: CPT | Performed by: OBSTETRICS & GYNECOLOGY

## 2018-08-06 NOTE — TELEPHONE ENCOUNTER
Called pt to f/u from [facility] on Jul 30. Pt doing well except for pain with urination. Will place order for UA C&S.    Ed Noriega MD  Professor, OB/GYN  Urogynecologist

## 2018-08-16 ENCOUNTER — OFFICE VISIT (OUTPATIENT)
Dept: UROLOGY | Facility: CLINIC | Age: 33
End: 2018-08-16
Attending: OBSTETRICS & GYNECOLOGY
Payer: COMMERCIAL

## 2018-08-16 VITALS
DIASTOLIC BLOOD PRESSURE: 75 MMHG | BODY MASS INDEX: 26.71 KG/M2 | SYSTOLIC BLOOD PRESSURE: 120 MMHG | HEART RATE: 62 BPM | WEIGHT: 170.2 LBS

## 2018-08-16 DIAGNOSIS — Z98.890 POSTOPERATIVE STATE: Primary | ICD-10-CM

## 2018-08-16 PROCEDURE — G0463 HOSPITAL OUTPT CLINIC VISIT: HCPCS | Mod: ZF

## 2018-08-16 ASSESSMENT — PAIN SCALES - GENERAL: PAINLEVEL: NO PAIN (0)

## 2018-08-16 NOTE — PROGRESS NOTES
August 16, 2018    Return visit    Patient returns today s/p TVH and uterosacral ligament suspension (7/30/18) for uterine prolapse and dyspareunia. Since surgery she has been doing well. She did have some dysuria immediately after surgery, which resolve after a couple days. Her mixed urinary incontinence symptoms have improved since surgery and she has been free of leakage episodes.    Denies vaginal bleeding, discharge and pain. Denies pelvic pain and difficulty urinating. She does have some constipation.    She has not had intercourse since surgery.    She recently went on a 4 day sailing trip on [facility] and was without post-op issues on the trip.    /75  Pulse 62  Wt 77.2 kg (170 lb 3.2 oz)  BMI 26.71 kg/m2  She is comfortable, in no distress, non-labored breathing.  Abdomen is soft, non-tender, non-distended.  Incisions clean, dry and intact.    /75  Pulse 62  Wt 77.2 kg (170 lb 3.2 oz)  BMI 26.71 kg/m2 No LMP recorded. Body mass index is 26.71 kg/(m^2).  She is alert, comfortable in no acute distress, non-labored breathing.   Abdomen is soft, non-tender, non-distended.  Normal external female genitalia. The urethra was unremarkable.    She has good support on supine strain.  Speculum exam is remarkable for pink, moist vaginal mucosa and well-healing vaginal cuff. No vaginal lesions present.      POPQ  Aa -1   Ba -1  C -8  D NA  GH 3  PB 4  TVL 10  Ap -2  Bp -2      A/P: 33 year old F s/p TVH and uterosacral ligament suspension for uterine prolapse and dyspareunia. She is doing great since surgery 2 weeks ago. Counseled patient to refrain from anything in the vagina and no intercourse until next follow-up visit. She should continue to limit heavy lifting and can slowly increase her physical activity as she is able.     Follow-up in one month for 6-week post-op visit, or sooner if needed.       Scribed by Aaron Jerome, MS4, [employer] [place] [employer], for Ed Noriega  MD.    I attest that I was present for the history and physical exam and that I agree with the findings and plan as outlined above.    A total of 15 minutes were spent with the patient today, > 50% in counseling and coordination of care    Ed Noriega MD  Professor, OB/GYN  Urogynecologist    CC  Patient Care Team:  Sona Borja PA-C as PCP - General  Ed Noriega MD as MD (OB/Gyn)  SELF, REFERRED

## 2018-08-16 NOTE — LETTER
8/16/2018       RE: Paulette Singer  [address] [facility] [address]     Dear Colleague,    Thank you for referring your patient, Paulette Singer, to the [facility] at [facility]. Please see a copy of my visit note below.    August 16, 2018    Return visit    Patient returns today s/p TVH and uterosacral ligament suspension (7/30/18) for uterine prolapse and dyspareunia. Since surgery she has been doing well. She did have some dysuria immediately after surgery, which resolve after a couple days. Her mixed urinary incontinence symptoms have improved since surgery and she has been free of leakage episodes.    Denies vaginal bleeding, discharge and pain. Denies pelvic pain and difficulty urinating. She does have some constipation.    She has not had intercourse since surgery.    She recently went on a 4 day sailing trip on [facility] and was without post-op issues on the trip.    /75  Pulse 62  Wt 77.2 kg (170 lb 3.2 oz)  BMI 26.71 kg/m2  She is comfortable, in no distress, non-labored breathing.  Abdomen is soft, non-tender, non-distended.  Incisions clean, dry and intact.    /75  Pulse 62  Wt 77.2 kg (170 lb 3.2 oz)  BMI 26.71 kg/m2 No LMP recorded. Body mass index is 26.71 kg/(m^2).  She is alert, comfortable in no acute distress, non-labored breathing.   Abdomen is soft, non-tender, non-distended.  Normal external female genitalia. The urethra was unremarkable.    She has good support on supine strain.  Speculum exam is remarkable for pink, moist vaginal mucosa and well-healing vaginal cuff. No vaginal lesions present.      POPQ  Aa -1   Ba -1  C -8  D NA  GH 3  PB 4  TVL 10  Ap -2  Bp -2      A/P: 33 year old F s/p TVH and uterosacral ligament suspension for uterine prolapse and dyspareunia. She is doing great since surgery 2 weeks ago. Counseled patient to refrain from anything in the vagina and no intercourse until next  follow-up visit. She should continue to limit heavy lifting and can slowly increase her physical activity as she is able.     Follow-up in one month for 6-week post-op visit, or sooner if needed.       Scribed by Aaron Jerome, MS4, [employer] [place] [employer], for Ed Noriega MD.    I attest that I was present for the history and physical exam and that I agree with the findings and plan as outlined above.    A total of 15 minutes were spent with the patient today, > 50% in counseling and coordination of care    Ed Noriega MD  Professor, OB/GYN  Urogynecologist    CC  Patient Care Team:  Sona Borja PA-C as PCP - General  Ed Noriega MD as MD (OB/Gyn)  SELF, REFERRED

## 2018-08-16 NOTE — MR AVS SNAPSHOT
After Visit Summary   8/16/2018    Paulette Singer    MRN: [id number]           Patient Information     Date Of Birth          1985        Visit Information        Provider Department      8/16/2018 10:30 AM Ed Noriega MD [facility]        Today's Diagnoses     Postoperative state    -  1       Follow-ups after your visit        Follow-up notes from your care team     Return in about 4 weeks (around 9/13/2018).      Your next 10 appointments already scheduled     Sep 12, 2018  3:30 PM CDT   Return Urogyn with Ed Noriega MD   [facility] ([facility])    [facility]  [address], [address]  [facility] [address] [phone]           Please call [facility] , [phone], with any questions or concerns you may have regarding your appointment              Who to contact     Please call your clinic at [phone] to:    Ask questions about your health    Make or cancel appointments    Discuss your medicines    Learn about your test results    Speak to your doctor            Additional Information About Your Visit        [employer] Information     [employer] gives you secure access to your electronic health record. If you see a primary care provider, you can also send messages to your care team and make appointments. If you have questions, please call your primary care clinic.  If you do not have a primary care provider, please call [phone] and they will assist you.      [employer] is an electronic gateway that provides easy, online access to your medical records. With [employer], you can request a clinic appointment, read your test results, renew a prescription or communicate with your care team.     To access your existing account, please contact your [facility] or call [phone] for assistance.        Care EveryWhere ID     This is your Care EveryWhere ID.  This could be used by other organizations to access your [facility] medical records  [id number]        Your Vitals Were     Pulse BMI (Body Mass Index)                62 26.71 kg/m2           Blood Pressure from Last 3 Encounters:   08/16/18 120/75   07/30/18 118/63   07/23/18 122/70    Weight from Last 3 Encounters:   08/16/18 77.2 kg (170 lb 3.2 oz)   07/30/18 77.5 kg (170 lb 12.7 oz)   07/23/18 78.5 kg (173 lb)              Today, you had the following     No orders found for display         Today's Medication Changes          These changes are accurate as of 8/16/18  1:36 PM.  If you have any questions, ask your nurse or doctor.               Stop taking these medicines if you haven't already. Please contact your care team if you have questions.     oxyCODONE-acetaminophen 5-325 MG per tablet   Commonly known as:  PERCOCET   Stopped by:  Ed Noriega MD           senna-docusate 8.6-50 MG per tablet   Commonly known as:  SENOKOT-S;PERICOLACE   Stopped by:  Ed Noriega MD                    Primary Care Provider Office Phone # Fax #    Sona CHELSEA Borja PA-C [phone] [address] [place] [address]        Equal Access to Services     ZEYAD MARTINEZ : Hadii salome sherman hadasho Soomaali, waaxda luqadaha, qaybta kaalmada adeegyada, carie seymour. So [facility] [phone].    ATENCIÓN: Si habla español, tiene a william disposición servicios gratCHRISTUS St. Vincent Physicians Medical Centeros de asistencia lingüística. Llame al [phone].    We comply with applicable federal civil rights laws and [place] laws. We do not discriminate on the basis of race, color, national origin, age, disability, sex, sexual orientation, or gender identity.            Thank you!     Thank you for choosing [facility]  for your care. Our goal is always to provide you with excellent care. Hearing back from our patients is one way we can continue to improve our services. Please take a few minutes to  complete the written survey that you may receive in the mail after your visit with us. Thank you!             Your Updated Medication List - Protect others around you: Learn how to safely use, store and throw away your medicines at www.disposemymeds.org.          This list is accurate as of 8/16/18  1:36 PM.  Always use your most recent med list.                   Brand Name Dispense Instructions for use Diagnosis    ibuprofen 600 MG tablet    ADVIL/MOTRIN    30 tablet    Take 1 tablet (600 mg) by mouth every 6 hours as needed for pain (mild)    S/P vaginal hysterectomy       mupirocin 2 % ointment    BACTROBAN     Apply 1 each topically 3 times daily

## 2018-09-27 ENCOUNTER — OFFICE VISIT (OUTPATIENT)
Dept: UROLOGY | Facility: CLINIC | Age: 33
End: 2018-09-27
Attending: OBSTETRICS & GYNECOLOGY
Payer: COMMERCIAL

## 2018-09-27 VITALS
BODY MASS INDEX: 27.06 KG/M2 | HEART RATE: 82 BPM | WEIGHT: 172.4 LBS | DIASTOLIC BLOOD PRESSURE: 81 MMHG | SYSTOLIC BLOOD PRESSURE: 123 MMHG

## 2018-09-27 DIAGNOSIS — Z98.890 POSTOPERATIVE STATE: Primary | ICD-10-CM

## 2018-09-27 PROCEDURE — G0463 HOSPITAL OUTPT CLINIC VISIT: HCPCS | Mod: ZF

## 2018-09-27 ASSESSMENT — PAIN SCALES - GENERAL: PAINLEVEL: NO PAIN (0)

## 2018-09-27 NOTE — NURSING NOTE
Post op vaginal hysterectomy 7--   Fries pain over right ovary two days ago- sharp stabby and achy pain most of the day.

## 2018-09-27 NOTE — PROGRESS NOTES
2018    Return visit    Paulette Singer is a 34 yo  postop s/p TVH, uterosacral ligament suspension on 18 for uterine prolpase and dyspareunia. She reports doing well since surgery She denies abdominal pain, vaginal bleeding, malodorous discharge or irritation, fevers or chills. She reports that her prolapse and incontinence symptoms have improved significantly since surgery, she denies any leaking of urine or episodes of incontinence. She previously reported some constipation but this has improved significantly with dietary modifications.     /81  Pulse 82  Wt 78.2 kg (172 lb 6.4 oz)  LMP 2018  BMI 27.06 kg/m2  Gen: Alert, comfortable, no acute distress  Abdomen: Soft, nontender, nondistended  : Normal appearing external female genitalia. Vaginal mucosa well estrogenized. Normal physiologic discharge. Vaginal cuff well healed, intact.       A/P: Paulette Singer is a 34 yo  female s/p TVH, uterosacral ligament suspension for pelvic organ prolapse, urinary incontinence, dyspareunia. Doing well postoperatively.   - Normal postoperative recovery  - Exam within normal limits with well healed vaginal cuff  - Counseled patient that she is cleared from pelvic rest and lifting restrictions  - Discussed importance of patient being seen by primary GYN yearly for exam, no longer requiring pap smears    Follow up with Dr. Noriega as needed if issues arise    Patria Merrill MD  OBGYN PGY-4  [phone]  11:24 AM 2018    I attest that I was present for the history and physical and that I agree with the findings and treatment plan outlined above.    Ed Noriega MD  Professor, OB/GYN  Urogynecologist

## 2018-09-27 NOTE — MR AVS SNAPSHOT
After Visit Summary   9/27/2018    Paulette Singer    MRN: [id number]           Patient Information     Date Of Birth          1985        Visit Information        Provider Department      9/27/2018 11:00 AM Ed Noriega MD [facility]        Care Instructions    Follow up with Dr. Noriega as needed. See your primary GYN yearly.           Follow-ups after your visit        Follow-up notes from your care team     Return if symptoms worsen or fail to improve.      Who to contact     Please call your clinic at [phone] to:    Ask questions about your health    Make or cancel appointments    Discuss your medicines    Learn about your test results    Speak to your doctor            Additional Information About Your Visit        [employer] Information     [employer] gives you secure access to your electronic health record. If you see a primary care provider, you can also send messages to your care team and make appointments. If you have questions, please call your primary care clinic.  If you do not have a primary care provider, please call [phone] and they will assist you.      [employer] is an electronic gateway that provides easy, online access to your medical records. With [employer], you can request a clinic appointment, read your test results, renew a prescription or communicate with your care team.     To access your existing account, please contact your [facility] or call [phone] for assistance.        Care EveryWhere ID     This is your Care EveryWhere ID. This could be used by other organizations to access your [facility] medical records  [id number]        Your Vitals Were     Pulse Last Period BMI (Body Mass Index)             82 07/30/2018 27.06 kg/m2          Blood Pressure from Last 3 Encounters:   09/27/18 123/81   08/16/18 120/75   07/30/18 118/63    Weight from Last 3 Encounters:   09/27/18 78.2 kg (172 lb 6.4 oz)   08/16/18  77.2 kg (170 lb 3.2 oz)   07/30/18 77.5 kg (170 lb 12.7 oz)              Today, you had the following     No orders found for display         Today's Medication Changes          These changes are accurate as of 9/27/18 11:23 AM.  If you have any questions, ask your nurse or doctor.               Stop taking these medicines if you haven't already. Please contact your care team if you have questions.     ibuprofen 600 MG tablet   Commonly known as:  ADVIL/MOTRIN           mupirocin 2 % ointment   Commonly known as:  BACTROBAN                    Primary Care Provider Office Phone # Fax #    Sona CHELSEA Borja PA-C [phone] [address] [place] [address]        Equal Access to Services     ZEYAD MARTINEZ : Domingo juareso Somarianne, waaxda luqadaha, qaybta kaalmada adeegyada, carie sandoval . So [facility] [phone].    ATENCIÓN: Si habla español, tiene a william disposición servicios gratuitos de asistencia lingüística. Ll[facility] [phone].    We comply with applicable federal civil rights laws and [place] laws. We do not discriminate on the basis of race, color, national origin, age, disability, sex, sexual orientation, or gender identity.            Thank you!     Thank you for choosing [facility]  for your care. Our goal is always to provide you with excellent care. Hearing back from our patients is one way we can continue to improve our services. Please take a few minutes to complete the written survey that you may receive in the mail after your visit with us. Thank you!             Your Updated Medication List - Protect others around you: Learn how to safely use, store and throw away your medicines at www.disposemymeds.org.      Notice  As of 9/27/2018 11:23 AM    You have not been prescribed any medications.

## 2018-09-27 NOTE — LETTER
2018       RE: Paulette Singer  [address] [facility] [address]     Dear Colleague,    Thank you for referring your patient, Paulette Singer, to the [facility] at [facility]. Please see a copy of my visit note below.    2018    Return visit    Paulette Singer is a 34 yo  postop s/p TVH, uterosacral ligament suspension on 18 for uterine prolpase and dyspareunia. She reports doing well since surgery She denies abdominal pain, vaginal bleeding, malodorous discharge or irritation, fevers or chills. She reports that her prolapse and incontinence symptoms have improved significantly since surgery, she denies any leaking of urine or episodes of incontinence. She previously reported some constipation but this has improved significantly with dietary modifications.     /81  Pulse 82  Wt 78.2 kg (172 lb 6.4 oz)  LMP 2018  BMI 27.06 kg/m2  Gen: Alert, comfortable, no acute distress  Abdomen: Soft, nontender, nondistended  : Normal appearing external female genitalia. Vaginal mucosa well estrogenized. Normal physiologic discharge. Vaginal cuff well healed, intact.       A/P: Paulette Singer is a 34 yo  female s/p TVH, uterosacral ligament suspension for pelvic organ prolapse, urinary incontinence, dyspareunia. Doing well postoperatively.   - Normal postoperative recovery  - Exam within normal limits with well healed vaginal cuff  - Counseled patient that she is cleared from pelvic rest and lifting restrictions  - Discussed importance of patient being seen by primary GYN yearly for exam, no longer requiring pap smears    Follow up with Dr. Noriega as needed if issues arise    Patria Merrill MD  OBGYN PGY-4  [phone]  11:24 AM 2018    I attest that I was present for the history and physical and that I agree with the findings and treatment plan outlined above.    Ed Noriega MD  Professor,  OB/GYN  Urogynecologist

## 2019-05-19 ENCOUNTER — OFFICE VISIT - HEALTHEAST (OUTPATIENT)
Dept: FAMILY MEDICINE | Facility: CLINIC | Age: 34
End: 2019-05-19

## 2019-05-19 ENCOUNTER — COMMUNICATION - HEALTHEAST (OUTPATIENT)
Dept: TELEHEALTH | Facility: CLINIC | Age: 34
End: 2019-05-19

## 2019-05-19 DIAGNOSIS — R09.81 NASAL CONGESTION: ICD-10-CM

## 2019-05-19 DIAGNOSIS — R05.9 COUGH: ICD-10-CM

## 2019-05-19 LAB
FLUAV AG SPEC QL IA: NORMAL
FLUBV AG SPEC QL IA: NORMAL

## 2019-09-22 ENCOUNTER — COMMUNICATION - HEALTHEAST (OUTPATIENT)
Dept: SCHEDULING | Facility: CLINIC | Age: 34
End: 2019-09-22

## 2019-09-30 ENCOUNTER — OFFICE VISIT - HEALTHEAST (OUTPATIENT)
Dept: CARDIOLOGY | Facility: CLINIC | Age: 34
End: 2019-09-30

## 2019-09-30 ENCOUNTER — HOSPITAL ENCOUNTER (OUTPATIENT)
Dept: CARDIOLOGY | Facility: CLINIC | Age: 34
Discharge: HOME | End: 2019-09-30
Attending: INTERNAL MEDICINE

## 2019-09-30 DIAGNOSIS — I49.3 PVC'S (PREMATURE VENTRICULAR CONTRACTIONS): ICD-10-CM

## 2019-09-30 DIAGNOSIS — R00.2 PALPITATIONS: ICD-10-CM

## 2019-09-30 ASSESSMENT — MIFFLIN-ST. JEOR: SCORE: 1508.28

## 2019-11-03 ENCOUNTER — HEALTH MAINTENANCE LETTER (OUTPATIENT)
Age: 34
End: 2019-11-03

## 2020-02-10 ENCOUNTER — HEALTH MAINTENANCE LETTER (OUTPATIENT)
Age: 35
End: 2020-02-10

## 2020-04-23 ENCOUNTER — TRANSFERRED RECORDS (OUTPATIENT)
Dept: HEALTH INFORMATION MANAGEMENT | Facility: CLINIC | Age: 35
End: 2020-04-23

## 2020-05-12 ENCOUNTER — AMBULATORY - HEALTHEAST (OUTPATIENT)
Dept: SURGERY | Facility: AMBULATORY SURGERY CENTER | Age: 35
End: 2020-05-12

## 2020-05-12 DIAGNOSIS — Z11.59 ENCOUNTER FOR SCREENING FOR OTHER VIRAL DISEASES: ICD-10-CM

## 2020-05-14 ENCOUNTER — COMMUNICATION - HEALTHEAST (OUTPATIENT)
Dept: FAMILY MEDICINE | Facility: CLINIC | Age: 35
End: 2020-05-14

## 2020-05-15 ENCOUNTER — COMMUNICATION - HEALTHEAST (OUTPATIENT)
Dept: FAMILY MEDICINE | Facility: CLINIC | Age: 35
End: 2020-05-15

## 2020-05-18 ENCOUNTER — OFFICE VISIT - HEALTHEAST (OUTPATIENT)
Dept: FAMILY MEDICINE | Facility: CLINIC | Age: 35
End: 2020-05-18

## 2020-05-18 DIAGNOSIS — Z11.59 ENCOUNTER FOR SCREENING FOR OTHER VIRAL DISEASES: ICD-10-CM

## 2020-05-18 ASSESSMENT — MIFFLIN-ST. JEOR: SCORE: 1458.39

## 2020-05-19 ENCOUNTER — OFFICE VISIT - HEALTHEAST (OUTPATIENT)
Dept: FAMILY MEDICINE | Facility: CLINIC | Age: 35
End: 2020-05-19

## 2020-05-19 DIAGNOSIS — Z01.818 PRE-OP EXAM: ICD-10-CM

## 2020-05-19 DIAGNOSIS — N83.202 CYST OF LEFT OVARY: ICD-10-CM

## 2020-05-19 LAB — HGB BLD-MCNC: 15.7 G/DL (ref 12–16)

## 2020-05-19 ASSESSMENT — MIFFLIN-ST. JEOR: SCORE: 1458.95

## 2020-05-20 ENCOUNTER — ANESTHESIA - HEALTHEAST (OUTPATIENT)
Dept: SURGERY | Facility: AMBULATORY SURGERY CENTER | Age: 35
End: 2020-05-20

## 2020-05-21 ENCOUNTER — SURGERY - HEALTHEAST (OUTPATIENT)
Dept: SURGERY | Facility: AMBULATORY SURGERY CENTER | Age: 35
End: 2020-05-21

## 2020-05-21 ASSESSMENT — MIFFLIN-ST. JEOR: SCORE: 1458.39

## 2020-06-26 ENCOUNTER — TRANSFERRED RECORDS (OUTPATIENT)
Dept: HEALTH INFORMATION MANAGEMENT | Facility: CLINIC | Age: 35
End: 2020-06-26

## 2020-07-20 ENCOUNTER — TRANSFERRED RECORDS (OUTPATIENT)
Dept: HEALTH INFORMATION MANAGEMENT | Facility: CLINIC | Age: 35
End: 2020-07-20

## 2020-07-31 ENCOUNTER — OFFICE VISIT - HEALTHEAST (OUTPATIENT)
Dept: FAMILY MEDICINE | Facility: CLINIC | Age: 35
End: 2020-07-31

## 2020-07-31 DIAGNOSIS — L72.3 SEBACEOUS CYST OF LEFT AXILLA: ICD-10-CM

## 2020-08-01 ENCOUNTER — COMMUNICATION - HEALTHEAST (OUTPATIENT)
Dept: SCHEDULING | Facility: CLINIC | Age: 35
End: 2020-08-01

## 2020-08-26 ENCOUNTER — TRANSFERRED RECORDS (OUTPATIENT)
Dept: HEALTH INFORMATION MANAGEMENT | Facility: CLINIC | Age: 35
End: 2020-08-26

## 2020-11-16 ENCOUNTER — HEALTH MAINTENANCE LETTER (OUTPATIENT)
Age: 35
End: 2020-11-16

## 2020-12-03 ENCOUNTER — VIRTUAL VISIT (OUTPATIENT)
Dept: FAMILY MEDICINE | Facility: OTHER | Age: 35
End: 2020-12-03

## 2020-12-03 NOTE — PROGRESS NOTES
"Date: 2020 09:46:53  Clinician: Edson Patrick  Clinician NPI: [id number]  Patient: Paulette smith  Patient : 1985  Patient Address: [address] [place] [address]  Patient Phone: [phone]  Visit Protocol: URI  Patient Summary:  Paulette is a 35 year old ( : 1985 ) female who initiated a [facility] Visit for COVID-19 (Coronavirus) evaluation and screening. When asked the question \"Please sign me up to receive news, health information and promotions. \", Paulette responded \"No\".    Paulette states her symptoms started gradually 5-6 days ago. After her symptoms started, they improved and then got worse again.   Her symptoms consist of a headache, a cough, nasal congestion, rhinitis, facial pain or pressure, myalgia, chills, malaise, a sore throat, ageusia, and anosmia. She is experiencing mild difficulty breathing with activities but can speak normally in full sentences. Paulette also feels feverish.   Symptom details     Nasal secretions: The color of her mucus is clear.    Cough: Paulette coughs almost every minute and her cough is more bothersome at night. Phlegm comes into her throat when she coughs. She does not believe her cough is caused by post-nasal drip. The color of the phlegm is white and clear.     Sore throat: Paulette reports having moderate throat pain (4-6 on a 10 point pain scale), does not have exudate on her tonsils, and can swallow liquids. She is not sure if the lymph nodes in her neck are enlarged. A rash has not appeared on the skin since the sore throat started.     Temperature: Her current temperature is 99.5 degrees Fahrenheit.     Facial pain or pressure: The facial pain or pressure feels worse when bending over or leaning forward.     Headache: She states the headache is moderate (4-6 on a 10 point pain scale).      Paulette denies having ear pain, wheezing, nausea, vomiting, teeth pain, and diarrhea. She also denies taking antibiotic medication in the past month, " having recent facial or sinus surgery in the past 60 days, and having a sinus infection within the past year.   Precipitating events  Within the past week, Paulette has not been exposed to someone with strep throat. She has not recently been exposed to someone with influenza. Paulette has been in close contact with the following high risk individuals: children under the age of 5.   Pertinent COVID-19 (Coronavirus) information  Paulette does not work or volunteer as healthcare worker or a . In the past 14 days, Paulette has not worked or volunteered at a healthcare facility or group living setting.   In the past 14 days, she also has not lived in a congregate living setting.   Paulette has had a close contact with a laboratory-confirmed COVID-19 patient within 14 days of symptom onset. She was not exposed at her work. Date Paulette was exposed to the laboratory-confirmed COVID-19 patient: 11/24/2020   Additional information about contact with COVID-19 (Coronavirus) patient as reported by the patient (free text): My close relative tested positive a few days after we saw him    Since December 2019, Paulette has not been tested for COVID-19 and has not had upper respiratory infection or influenza-like illness.   Pertinent medical history  She has not been told by her provider to avoid NSAIDs.   Paulette does not get yeast infections when she takes antibiotics.   Paulette does not have diabetes. She denies having immunosuppressive conditions (e.g., chemotherapy, HIV, organ transplant, long-term use of steroids or other immunosuppressive medications, splenectomy). She does not have severe COPD and congestive heart failure. She does not have asthma.   Paulette does not need a return to work/school note.   Weight: 158 lbs   Paulette does not smoke or use smokeless tobacco.   She denies pregnancy and denies breastfeeding. She does not menstruate.   Additional information as reported by the patient (free text): Every time I breathe  I cough and it's painful. Getting worse instead of better. This is day 5 of fevers (all under 102)   Weight: 158 lbs  A synchronous phone visit was initiated by the provider for the following reason: chest pain    MEDICATIONS: No current medications, ALLERGIES: amoxicillin  Clinician Response:  Deadarlin Grimes    Diagnosis: Contact with and (suspected) exposure to other viral communicable diseases  Diagnosis ICD: Z20.828  Triage Notes: I reviewed the patient's history, verified their identity, and explained the [facility] Visit process.    chest pain with cough and breathing  Synchronous Triage: phone, status: completed, duration: 212 seconds

## 2020-12-04 ENCOUNTER — COMMUNICATION - HEALTHEAST (OUTPATIENT)
Dept: SCHEDULING | Facility: CLINIC | Age: 35
End: 2020-12-04

## 2020-12-07 ENCOUNTER — COMMUNICATION - HEALTHEAST (OUTPATIENT)
Dept: CARE COORDINATION | Facility: CLINIC | Age: 35
End: 2020-12-07

## 2020-12-07 ENCOUNTER — AMBULATORY - HEALTHEAST (OUTPATIENT)
Dept: CARE COORDINATION | Facility: CLINIC | Age: 35
End: 2020-12-07

## 2020-12-07 DIAGNOSIS — U07.1 2019 NOVEL CORONAVIRUS DISEASE (COVID-19): ICD-10-CM

## 2021-01-26 ENCOUNTER — MEDICAL CORRESPONDENCE (OUTPATIENT)
Dept: HEALTH INFORMATION MANAGEMENT | Facility: CLINIC | Age: 36
End: 2021-01-26

## 2021-02-10 ENCOUNTER — VIRTUAL VISIT (OUTPATIENT)
Dept: UROLOGY | Facility: CLINIC | Age: 36
End: 2021-02-10
Payer: COMMERCIAL

## 2021-02-10 VITALS — BODY MASS INDEX: 25.11 KG/M2 | WEIGHT: 160 LBS | HEIGHT: 67 IN

## 2021-02-10 DIAGNOSIS — Q62.5 DUPLICATED LEFT RENAL COLLECTING SYSTEM: ICD-10-CM

## 2021-02-10 DIAGNOSIS — R10.9 LEFT FLANK PAIN: Primary | ICD-10-CM

## 2021-02-10 DIAGNOSIS — Q62.5 DUPLICATED RIGHT RENAL COLLECTING SYSTEM: ICD-10-CM

## 2021-02-10 PROCEDURE — 99204 OFFICE O/P NEW MOD 45 MIN: CPT | Mod: 95 | Performed by: UROLOGY

## 2021-02-10 ASSESSMENT — PAIN SCALES - GENERAL: PAINLEVEL: MILD PAIN (2)

## 2021-02-10 ASSESSMENT — MIFFLIN-ST. JEOR: SCORE: 1463.39

## 2021-02-10 NOTE — PROGRESS NOTES
"Urology Consult History and Physical  [facility]   Name: Paulette Singer    MRN: [id number]   YOB: 1985       We were asked to see Paulette Singer at the request of SELF for evaluation and treatment of left flank pain.        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    She continues to have intermittent flank pain about once a month  Now has been constant for the past few weeks  Feels like a burning in her left flank with radiation to her left lower abdomen   She has continued to have frequent UTI - again has been managing with herbs   \"A little bit of a hippie\"  Takes a lot of supplements     Shelley performed a Deflux in 06/2017    8 year old, 6 year old, and 4 year old boys     PMH: pelvic floor dysfunction  PSH: partial hysterectomy and POP, Deflux             Past Medical History:     Past Medical History:   Diagnosis Date     Abnormal CAT scan 03/28/2017    bilateral duplicate renal     Cystocele, midline 7/23/2018     Headache 9/4/2007     Hernia, abdominal      Low ferritin 11/2016    3rd pregnancy     Migraines age 17    migraine/sinus headaches, better with excedrin     Mixed stress and urge urinary incontinence 7/23/2018     Palpitations      Plantar fasciitis 2/25/2009    See podiatry consult     Uterine prolapse 7/23/2018            Past Surgical History:     Past Surgical History:   Procedure Laterality Date     CYSTOSCOPY N/A 7/30/2018    Procedure: CYSTOSCOPY;;  Surgeon: Ed Noriega MD;  [facility]     CYSTOSCOPY, INJECT VESICOURETERAL REFLUX GEL N/A 6/7/2017    Procedure: " CYSTOSCOPY, INJECT VESICOURETERAL REFLUX GEL;  CYSTOSCOPY WITH SUBMUCOSAL INJECTION OF DEFLUX ;  Surgeon: Jeferson White MD;  [facility]     CYSTOSCOPY, RETROGRADES, COMBINED Bilateral 4/19/2017    Procedure: COMBINED CYSTOSCOPY, RETROGRADES;  CYSTOSCOPY, LEFT URETEROSCOPY, LEFT STENT PLACEMENT & RETRIEVAL, BILATERAL RETROGRADES;  Surgeon: Jeferson White MD;  Location[facility]     CYSTOSCOPY, URETEROSCOPY, COMBINED Left 4/19/2017    Procedure: COMBINED CYSTOSCOPY, URETEROSCOPY;;  Surgeon: Jeferson White MD;  [facility]     HERNIA REPAIR, INGUINAL RT/LT      bilateral as a baby     HYSTERECTOMY VAGINAL N/A 7/30/2018    Procedure: HYSTERECTOMY VAGINAL;  Total Vaginal Hysterectomy, Bilateral salpingectomy;  Surgeon: Ed Noriega MD;  [facility]     INSERT INTRAUTERINE DEVICE  6/2009    Mirena IUD under anesthetic     MOUTH SURGERY      [place] teeth     PELVIS LAPAROSCOPY,DX  6/2009    normal     ZZHC UGI ENDOSCOPY, SIMPLE EXAM  09/11/2007            Social History:     Social History     Tobacco Use     Smoking status: Never Smoker     Smokeless tobacco: Never Used   Substance Use Topics     Alcohol use: Yes     Alcohol/week: 0.0 standard drinks     Comment: glass a week       History   Smoking Status     Never Smoker   Smokeless Tobacco     Never Used            Family History:     Family History   Problem Relation Age of Onset     Depression Mother      Thyroid Disease Mother      Gynecology Sister         endometriosis     Cancer Paternal Grandmother         eye              Allergies:     Allergies   Allergen Reactions     Amoxicillin Rash     Penicillins Rash            Medications:     No current outpatient medications on file.     No current facility-administered medications for this visit.              Review of Systems:     Skin: negative  Eyes: negative  Ears/Nose/Throat: negative  Respiratory: No shortness of breath, dyspnea on exertion, cough, or  "hemoptysis  Cardiovascular: negative  Gastrointestinal: negative  Genitourinary: as above  Musculoskeletal: negative  Neurologic: negative  Psychiatric: negative  Hematologic/Lymphatic/Immunologic: negative  Endocrine: negative          Physical Exam:     PHYSICAL EXAM  Patient is a 35 year old  female   Vitals: Height 1.702 m (5' 7\"), weight 72.6 kg (160 lb), last menstrual period 07/30/2018, currently breastfeeding.  Body mass index is 25.06 kg/m .  General Appearance Adult:   Alert, no acute distress, oriented  HENT: throat/mouth:normal, good dentition  Lungs: no respiratory distress, or pursed lip breathing  Heart: No obvious jugular venous distension present  Abdomen: non - distended  Musculoskeltal: extremities normal, no peripheral edema  Skin: no suspicious lesions or rashes  Neuro: Alert, oriented, speech and mentation normal  Psych: affect and mood normal  Gait: Normal           Data:   All laboratory data reviewed:    UA RESULTS:  Recent Labs   Lab Test 08/06/18  1147 03/17/17  1545 03/17/17  1545   COLOR Straw   < > Yellow   APPEARANCE Clear   < > Clear   URINEGLC Negative   < > Negative   URINEBILI Negative   < > Negative   URINEKETONE Negative   < > Negative   SG 1.005   < > 1.010   UBLD Negative   < > Moderate*   URINEPH 7.0   < > 5.5   PROTEIN Negative   < > Negative   UROBILINOGEN  --   --  0.2   NITRITE Negative   < > Negative   LEUKEST Negative   < > Small*   RBCU <1   < > 2-5*   WBCU 1   < > 5-10*    < > = values in this interval not displayed.     Lab Results   Component Value Date    CR 0.94 03/17/2017      IMAGING:  All pertinent imaging reviewed:    All imaging studies reviewed by me.  I personally reviewed these imaging films.  A formal report from radiology will follow.    CT UROGRAM 3/28/2017:                                                              IMPRESSION:  1. Duplicated bilateral intrarenal collecting systems with mild  atrophy of the left superior pole and marked hydroureter of " the ureter  draining the right superior pole from the mid ureter to the urinary  bladder. There does appear to be some tapering at the urinary bladder  with thickening of the wall of the ureter which could be secondary to  a chronic inflammation/scarring. A ureterocele is considered less  likely but difficult to exclude. The lower pole collecting system on  the left is of normal caliber. It is difficult to follow past the  proximal portion of the ureter and I cannot exclude that it has  confluence with the upper pole ureter in this location.  2. Upper and lower pole right intrarenal collecting systems are of  normal caliber. One of these extends to the urinary bladder. The other  one is difficult to follow through the pelvis but appears to extend at  least to the level of the pelvis.  3. Further evaluation with cystoscopy is recommended especially to  evaluate the left ureteral orifice and distal ureter to determine  etiology for the obstructive process of the distal left ureter.    VCUG 4/24/2017:  IMPRESSION:  1. Mild grade 1 vesicoureteral reflux into one of the distal left  ureters.  2. No contour abnormality of the urinary bladder is identified.  3. No other evidence for reflux or other abnormality is seen.         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 Following work up in 2017 there was no  clear evidence of reflux - possibly grade I at the most, and then she underwent a deflux procedure, which would only make the obstruction of this moiety worse  - We discussed the need for updated imaging with a CT Urogram for anatomic identification and a NM renal scan to assess for split function of the various renal moieties as well as the degree of obstruction of the left upper pole moiety  - plan for follow up after these studies are complete to review and discuss options     Thank you for the kind consultation.    Time spent: 47 minutes spent on the date of the encounter doing chart review, history and exam, documentation and further activities as noted above.    Brendan Hanson MD   Urology  [facility]  [facility] Phone: [phone]  [facility] Phone: [phone]         Paulette is a 35 year old who is being evaluated via a billable video visit.      How would you like to obtain your AVS? MyChart  If the video visit is dropped, the invitation should be resent by: Text to cell phone: [phone]  Will anyone else be joining your video visit? No    Video-Visit Details    Type of service:  Video Visit    Video Start Time: 4:31 PM    Video End Time:5:00 PM    Originating Location (pt. Location): Home    Distant Location (provider location):  [facility]     Platform used for Video Visit: [employer]

## 2021-02-10 NOTE — LETTER
"2/10/2021       RE: Paulette Singer  [address] [address] [place] [id number]     Dear Colleague,    Thank you for referring your patient, Paulette Singer, to the [facility] at [facility]. Please see a copy of my visit note below.    Urology Consult History and Physical  [place]   Name: Paulette Singer    MRN: [id number]   YOB: 1985       We were asked to see Paulette Singer at the request of SELF for evaluation and treatment of left flank pain.        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    She continues to have intermittent flank pain about once a month  Now has been constant for the past few weeks  Feels like a burning in her left flank with radiation to her left lower abdomen   She has continued to have frequent UTI - again has been managing with herbs   \"A little bit of a hippie\"  Takes a lot of supplements     Shelley performed a Deflux in 06/2017    8 year old, 6 year old, and 4 year old boys     PMH: pelvic floor dysfunction  PSH: partial hysterectomy and POP, Deflux             Past Medical History:     Past Medical History:   Diagnosis Date     Abnormal CAT scan 03/28/2017    bilateral duplicate renal     Cystocele, midline 7/23/2018     Headache 9/4/2007     Hernia, abdominal      Low ferritin 11/2016    3rd pregnancy     Migraines age 17    migraine/sinus headaches, better with excedrin     Mixed stress and urge urinary incontinence 7/23/2018     Palpitations      Plantar fasciitis 2/25/2009    See podiatry consult     " Uterine prolapse 7/23/2018            Past Surgical History:     Past Surgical History:   Procedure Laterality Date     CYSTOSCOPY N/A 7/30/2018    Procedure: CYSTOSCOPY;;  Surgeon: Ed Noriega MD;  [facility]     CYSTOSCOPY, INJECT VESICOURETERAL REFLUX GEL N/A 6/7/2017    Procedure: CYSTOSCOPY, INJECT VESICOURETERAL REFLUX GEL;  CYSTOSCOPY WITH SUBMUCOSAL INJECTION OF DEFLUX ;  Surgeon: Jeferson White MD;  [facility]     CYSTOSCOPY, RETROGRADES, COMBINED Bilateral 4/19/2017    Procedure: COMBINED CYSTOSCOPY, RETROGRADES;  CYSTOSCOPY, LEFT URETEROSCOPY, LEFT STENT PLACEMENT & RETRIEVAL, BILATERAL RETROGRADES;  Surgeon: Jeferson White MD;  [facility]     CYSTOSCOPY, URETEROSCOPY, COMBINED Left 4/19/2017    Procedure: COMBINED CYSTOSCOPY, URETEROSCOPY;;  Surgeon: Jeferson White MD;  [facility]     HERNIA REPAIR, INGUINAL RT/LT      bilateral as a baby     HYSTERECTOMY VAGINAL N/A 7/30/2018    Procedure: HYSTERECTOMY VAGINAL;  Total Vaginal Hysterectomy, Bilateral salpingectomy;  Surgeon: Ed Noriega MD;  [facility]     INSERT INTRAUTERINE DEVICE  6/2009    Mirena IUD under anesthetic     MOUTH SURGERY      [place] teeth     PELVIS LAPAROSCOPY,DX  6/2009    normal     ZZHC UGI ENDOSCOPY, SIMPLE EXAM  09/11/2007            Social History:     Social History     Tobacco Use     Smoking status: Never Smoker     Smokeless tobacco: Never Used   Substance Use Topics     Alcohol use: Yes     Alcohol/week: 0.0 standard drinks     Comment: glass a week       History   Smoking Status     Never Smoker   Smokeless Tobacco     Never Used            Family History:     Family History   Problem Relation Age of Onset     Depression Mother      Thyroid Disease Mother      Gynecology Sister         endometriosis     Cancer Paternal Grandmother         eye              Allergies:     Allergies   Allergen Reactions     Amoxicillin Rash     Penicillins Rash            Medications:  "    No current outpatient medications on file.     No current facility-administered medications for this visit.              Review of Systems:     Skin: negative  Eyes: negative  Ears/Nose/Throat: negative  Respiratory: No shortness of breath, dyspnea on exertion, cough, or hemoptysis  Cardiovascular: negative  Gastrointestinal: negative  Genitourinary: as above  Musculoskeletal: negative  Neurologic: negative  Psychiatric: negative  Hematologic/Lymphatic/Immunologic: negative  Endocrine: negative          Physical Exam:     PHYSICAL EXAM  Patient is a 35 year old  female   Vitals: Height 1.702 m (5' 7\"), weight 72.6 kg (160 lb), last menstrual period 07/30/2018, currently breastfeeding.  Body mass index is 25.06 kg/m .  General Appearance Adult:   Alert, no acute distress, oriented  HENT: throat/mouth:normal, good dentition  Lungs: no respiratory distress, or pursed lip breathing  Heart: No obvious jugular venous distension present  Abdomen: non - distended  Musculoskeltal: extremities normal, no peripheral edema  Skin: no suspicious lesions or rashes  Neuro: Alert, oriented, speech and mentation normal  Psych: affect and mood normal  Gait: Normal           Data:   All laboratory data reviewed:    UA RESULTS:  Recent Labs   Lab Test 08/06/18  1147 03/17/17  1545 03/17/17  1545   COLOR Straw   < > Yellow   APPEARANCE Clear   < > Clear   URINEGLC Negative   < > Negative   URINEBILI Negative   < > Negative   URINEKETONE Negative   < > Negative   SG 1.005   < > 1.010   UBLD Negative   < > Moderate*   URINEPH 7.0   < > 5.5   PROTEIN Negative   < > Negative   UROBILINOGEN  --   --  0.2   NITRITE Negative   < > Negative   LEUKEST Negative   < > Small*   RBCU <1   < > 2-5*   WBCU 1   < > 5-10*    < > = values in this interval not displayed.     Lab Results   Component Value Date    CR 0.94 03/17/2017      IMAGING:  All pertinent imaging reviewed:    All imaging studies reviewed by me.  I personally reviewed these imaging " films.  A formal report from radiology will follow.    CT UROGRAM 3/28/2017:                                                              IMPRESSION:  1. Duplicated bilateral intrarenal collecting systems with mild  atrophy of the left superior pole and marked hydroureter of the ureter  draining the right superior pole from the mid ureter to the urinary  bladder. There does appear to be some tapering at the urinary bladder  with thickening of the wall of the ureter which could be secondary to  a chronic inflammation/scarring. A ureterocele is considered less  likely but difficult to exclude. The lower pole collecting system on  the left is of normal caliber. It is difficult to follow past the  proximal portion of the ureter and I cannot exclude that it has  confluence with the upper pole ureter in this location.  2. Upper and lower pole right intrarenal collecting systems are of  normal caliber. One of these extends to the urinary bladder. The other  one is difficult to follow through the pelvis but appears to extend at  least to the level of the pelvis.  3. Further evaluation with cystoscopy is recommended especially to  evaluate the left ureteral orifice and distal ureter to determine  etiology for the obstructive process of the distal left ureter.    VCUG 4/24/2017:  IMPRESSION:  1. Mild grade 1 vesicoureteral reflux into one of the distal left  ureters.  2. No contour abnormality of the urinary bladder is identified.  3. No other evidence for reflux or other abnormality is seen.         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 Following work up in 2017 there was no clear evidence of reflux - possibly grade I at the most, and then she underwent a deflux procedure, which would only make the obstruction of this moiety worse  - We discussed the need for updated imaging with a CT Urogram for anatomic identification and a NM renal scan to assess for split function of the various renal moieties as well as the degree of obstruction of the left upper pole moiety  - plan for follow up after these studies are complete to review and discuss options     Thank you for the kind consultation.    Time spent: 47 minutes spent on the date of the encounter doing chart review, history and exam, documentation and further activities as noted above.    Brendan Hanson MD   Urology  [facility]  [facility] Phone: [phone]  [facility] Phone: [phone]         Paulette is a 35 year old who is being evaluated via a billable video visit.      How would you like to obtain your AVS? MyChart  If the video visit is dropped, the invitation should be resent by: Text to cell phone: [phone]  Will anyone else be joining your video visit? No    Video-Visit Details    Type of service:  Video Visit    Video Start Time: 4:31 PM    Video End Time:5:00 PM    Originating Location (pt. Location): Home    Distant Location (provider location):  [facility]     Platform used for Video Visit: [employer]

## 2021-02-22 ENCOUNTER — HOSPITAL ENCOUNTER (OUTPATIENT)
Dept: NUCLEAR MEDICINE | Facility: CLINIC | Age: 36
Setting detail: NUCLEAR MEDICINE
End: 2021-02-22
Attending: UROLOGY
Payer: COMMERCIAL

## 2021-02-22 ENCOUNTER — HOSPITAL ENCOUNTER (OUTPATIENT)
Dept: CT IMAGING | Facility: CLINIC | Age: 36
End: 2021-02-22
Attending: UROLOGY
Payer: COMMERCIAL

## 2021-02-22 DIAGNOSIS — R10.9 LEFT FLANK PAIN: ICD-10-CM

## 2021-02-22 DIAGNOSIS — Q62.5 DUPLICATED LEFT RENAL COLLECTING SYSTEM: ICD-10-CM

## 2021-02-22 DIAGNOSIS — Q62.5 DUPLICATED RIGHT RENAL COLLECTING SYSTEM: ICD-10-CM

## 2021-02-22 PROCEDURE — A9562 TC99M MERTIATIDE: HCPCS | Performed by: UROLOGY

## 2021-02-22 PROCEDURE — 343N000001 HC RX 343: Performed by: UROLOGY

## 2021-02-22 PROCEDURE — 250N000011 HC RX IP 250 OP 636: Performed by: UROLOGY

## 2021-02-22 PROCEDURE — 250N000009 HC RX 250: Performed by: UROLOGY

## 2021-02-22 PROCEDURE — 74178 CT ABD&PLV WO CNTR FLWD CNTR: CPT

## 2021-02-22 PROCEDURE — 78708 K FLOW/FUNCT IMAGE W/DRUG: CPT

## 2021-02-22 RX ORDER — IOPAMIDOL 755 MG/ML
120 INJECTION, SOLUTION INTRAVASCULAR ONCE
Status: COMPLETED | OUTPATIENT
Start: 2021-02-22 | End: 2021-02-22

## 2021-02-22 RX ORDER — FUROSEMIDE 10 MG/ML
40 INJECTION INTRAMUSCULAR; INTRAVENOUS ONCE
Status: COMPLETED | OUTPATIENT
Start: 2021-02-22 | End: 2021-02-22

## 2021-02-22 RX ADMIN — IOPAMIDOL 120 ML: 755 INJECTION, SOLUTION INTRAVENOUS at 09:43

## 2021-02-22 RX ADMIN — TECHNESCAN TC 99M MERTIATIDE 8.7 MILLICURIE: 1 INJECTION, POWDER, LYOPHILIZED, FOR SOLUTION INTRAVENOUS at 09:10

## 2021-02-22 RX ADMIN — SODIUM CHLORIDE 100 ML: 9 INJECTION, SOLUTION INTRAVENOUS at 09:43

## 2021-02-22 RX ADMIN — FUROSEMIDE 40 MG: 10 INJECTION INTRAMUSCULAR; INTRAVENOUS at 09:15

## 2021-03-01 ENCOUNTER — VIRTUAL VISIT (OUTPATIENT)
Dept: UROLOGY | Facility: CLINIC | Age: 36
End: 2021-03-01
Payer: COMMERCIAL

## 2021-03-01 VITALS — HEIGHT: 67 IN | BODY MASS INDEX: 25.11 KG/M2 | WEIGHT: 160 LBS

## 2021-03-01 DIAGNOSIS — R10.9 LEFT FLANK PAIN: Primary | ICD-10-CM

## 2021-03-01 DIAGNOSIS — Q62.5 DUPLICATED LEFT RENAL COLLECTING SYSTEM: ICD-10-CM

## 2021-03-01 PROCEDURE — 99215 OFFICE O/P EST HI 40 MIN: CPT | Mod: 95 | Performed by: UROLOGY

## 2021-03-01 ASSESSMENT — PAIN SCALES - GENERAL: PAINLEVEL: MILD PAIN (3)

## 2021-03-01 ASSESSMENT — MIFFLIN-ST. JEOR: SCORE: 1463.39

## 2021-03-01 NOTE — NURSING NOTE
"Chief Complaint   Patient presents with     Left Flank Pain     Patient will have a video call to discuss her CT result with Margie       Height 1.702 m (5' 7\"), weight 72.6 kg (160 lb), last menstrual period 07/30/2018, currently breastfeeding. Body mass index is 25.06 kg/m .    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       Social History     Tobacco Use     Smoking status: Never Smoker     Smokeless tobacco: Never Used   Substance Use Topics     Alcohol use: Yes     Alcohol/week: 0.0 standard drinks     Comment: glass a week     Drug use: No       YANDEL Hummel  3/1/2021  10:50 AM       "

## 2021-03-01 NOTE — PROGRESS NOTES
"[facility]  CHIEF COMPLAINT   It was my pleasure to see Paulette Singer who is a 35 year old female for follow-up of LEFT flank and pelvic pain.      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     She continues to have intermittent flank pain about once a month  Now has been constant for the past few weeks  Feels like a burning in her left flank with radiation to her left lower abdomen   She has continued to have frequent UTI - again has been managing with herbs   \"A little bit of a hippie\"  Takes a lot of supplements      Shelley performed a Deflux in 06/2017 - unclear which moiety      8 year old, 6 year old, and 4 year old boys      PMH: pelvic floor dysfunction  PSH: partial hysterectomy and POP, Deflux    Did not tolerate office cystoscopy well    Does have bothersome stress incontinence     TODAY:  Follow-up today to discuss her nuclear medicine renal scan and CT urogram  She notes that with the nuclear medicine renal scan, this was very unpleasant as the administration of the Lasix prompted her intense flank pain radiating to her left groin and pelvis    PHYSICAL EXAM  Patient is a 35 year old  female   Vitals: Height 1.702 m (5' 7\"), weight 72.6 kg (160 lb), last menstrual period 07/30/2018, currently breastfeeding.  Body mass index is 25.06 kg/m .  General Appearance Adult:   Alert, no acute distress, oriented  HENT: throat/mouth:normal, good dentition  Lungs: no respiratory distress, or pursed lip breathing  Heart: No obvious jugular venous distension present  Abdomen: non - distended  Musculoskeltal: extremities normal, no peripheral edema  Skin: no " suspicious lesions or rashes  Neuro: Alert, oriented, speech and mentation normal  Psych: affect and mood normal  Gait: Normal     UA RESULTS:  Recent Labs   Lab Test 08/06/18  1147 03/17/17  1545 03/17/17  1545   COLOR Straw   < > Yellow   APPEARANCE Clear   < > Clear   URINEGLC Negative   < > Negative   URINEBILI Negative   < > Negative   URINEKETONE Negative   < > Negative   SG 1.005   < > 1.010   UBLD Negative   < > Moderate*   URINEPH 7.0   < > 5.5   PROTEIN Negative   < > Negative   UROBILINOGEN  --   --  0.2   NITRITE Negative   < > Negative   LEUKEST Negative   < > Small*   RBCU <1   < > 2-5*   WBCU 1   < > 5-10*    < > = values in this interval not displayed.      Creatinine   Date Value Ref Range Status   03/17/2017 0.94 0.52 - 1.04 mg/dL Final      IMAGING:  All pertinent imaging reviewed:    All imaging studies reviewed by me.  I personally reviewed these imaging films.  A formal report from radiology will follow.    CT UROGRAM:  FINDINGS: There are no stones seen within either kidney, either  ureter, or the bladder. Duplicated collecting systems noted with  hydronephrosis of the left upper pole system. There is mild cortical  thinning of the left upper pole, with contrast present in the urinary  collecting system.     Liver, adrenal glands, spleen, and pancreas unremarkable.     There are no dilated loops of small intestine or large bowel to  suggest ileus or obstruction.     Survey of the visualized bony structures demonstrates no destructive  bony lesions. The visualized lung bases are unremarkable.                                                                      IMPRESSION: Bilateral duplicated intrarenal collecting systems, only  mild atrophy is seen in the left upper pole and contrast is present in  the dilated left upper pole ureter suggesting significant residual  function. No significant change in mild atrophy since 2017.    NM RENAL SCAN:  FINDINGS: Evaluation of initial blood flow images  demonstrates  symmetric blood flow. Delayed images demonstrate minimal stasis in the  right intrarenal collecting system. The dilated duplicated renal  collecting system on the left is less well seen but there is a small  amount of activity there. Little additional affect of Lasix. Function  of the right kidney is calculated at 55%, left kidney 45%. Split  function analysis of the left kidney demonstrates 42% of the activity  as in the upper pole and 58% of activity is in the lower pole.                                                          IMPRESSION: Split function analysis of the left kidney suggests 42%  left kidney function is in the upper pole moiety and 58% is in the  lower moiety.    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  In addition we discussed that the upper pole of the left kidney appears to contribute 42% of that moieties kidney function, indicating good function and that all treatment efforts should be to preserve renal function  -We discussed that given the onset of her symptoms with the nuclear medicine Lasix renal scan, this indicates that her symptoms are related to the obstructive process of the upper pole moiety.  -Reviewed her CT urogram which demonstrates hydroureteronephrosis of the upper pole moiety leading down to an ectopic ureter.  There does not seem appear to be significant ureterocele component  -We discussed that I have discussed and reviewed her case with several of my colleagues, including our pediatric urologist Dr. Dove as well as my partner Dr. Diane  -We discussed that  treatment recommendations at this time focus on maintaining function of the upper pole moiety while improving its drainage and that this would be best served with a left robotic assisted laparoscopic ureteroureterostomy  -We discussed the risks, benefits, and possible side effects of the surgery and the expected postoperative course and outcome.  We discussed the need for ureteral stent placement at the time of surgery for 3 to 4 weeks  -Order for surgery placed in a separate encounter    Time spent: 40 minutes spent on the date of the encounter doing chart review, history and exam, documentation and further activities as noted above.    Brendan Hanson MD   Urology  [facility]  [facility] Phone: [phone]  [facility] Phone: [phone]        Paulette is a 35 year old who is being evaluated via a billable video visit.      How would you like to obtain your AVS? MyChart  If the video visit is dropped, the invitation should be resent by: Text to cell phone: [phone]  Will anyone else be joining your video visit? No      Video-Visit Details    Type of service:  Video Visit    Video Start Time: 11:30    Video End Time:11:50    Originating Location (pt. Location): Home    Distant Location (provider location):  [facility] [place]     Platform used for Video Visit: [employer]

## 2021-03-01 NOTE — LETTER
"3/1/2021       RE: Paulette Singer  [address] [address] [place] [id number]     Dear Colleague,    Thank you for referring your patient, Paulette Singer, to the [facility] [place] at [facility]. Please see a copy of my visit note below.    [place]  CHIEF COMPLAINT   It was my pleasure to see Paulette Singer who is a 35 year old female for follow-up of LEFT flank and pelvic pain.      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     She continues to have intermittent flank pain about once a month  Now has been constant for the past few weeks  Feels like a burning in her left flank with radiation to her left lower abdomen   She has continued to have frequent UTI - again has been managing with herbs   \"A little bit of a hippie\"  Takes a lot of supplements      Shelley performed a Deflux in 06/2017 - unclear which moiety      8 year old, 6 year old, and 4 year old boys      PMH: pelvic floor dysfunction  PSH: partial hysterectomy and POP, Deflux    Did not tolerate office cystoscopy well    Does have bothersome stress incontinence     TODAY:  Follow-up today to discuss her nuclear medicine renal scan and CT urogram  She notes that with the nuclear medicine renal scan, this was very unpleasant as the administration of the Lasix prompted her intense flank pain radiating to her left groin and pelvis    PHYSICAL EXAM  Patient is a 35 year old  female   Vitals: Height 1.702 m (5' 7\"), weight 72.6 kg (160 lb), last menstrual period 07/30/2018, currently breastfeeding.  Body mass index is 25.06 kg/m .  General " Appearance Adult:   Alert, no acute distress, oriented  HENT: throat/mouth:normal, good dentition  Lungs: no respiratory distress, or pursed lip breathing  Heart: No obvious jugular venous distension present  Abdomen: non - distended  Musculoskeltal: extremities normal, no peripheral edema  Skin: no suspicious lesions or rashes  Neuro: Alert, oriented, speech and mentation normal  Psych: affect and mood normal  Gait: Normal     UA RESULTS:  Recent Labs   Lab Test 08/06/18  1147 03/17/17  1545 03/17/17  1545   COLOR Straw   < > Yellow   APPEARANCE Clear   < > Clear   URINEGLC Negative   < > Negative   URINEBILI Negative   < > Negative   URINEKETONE Negative   < > Negative   SG 1.005   < > 1.010   UBLD Negative   < > Moderate*   URINEPH 7.0   < > 5.5   PROTEIN Negative   < > Negative   UROBILINOGEN  --   --  0.2   NITRITE Negative   < > Negative   LEUKEST Negative   < > Small*   RBCU <1   < > 2-5*   WBCU 1   < > 5-10*    < > = values in this interval not displayed.      Creatinine   Date Value Ref Range Status   03/17/2017 0.94 0.52 - 1.04 mg/dL Final      IMAGING:  All pertinent imaging reviewed:    All imaging studies reviewed by me.  I personally reviewed these imaging films.  A formal report from radiology will follow.    CT UROGRAM:  FINDINGS: There are no stones seen within either kidney, either  ureter, or the bladder. Duplicated collecting systems noted with  hydronephrosis of the left upper pole system. There is mild cortical  thinning of the left upper pole, with contrast present in the urinary  collecting system.     Liver, adrenal glands, spleen, and pancreas unremarkable.     There are no dilated loops of small intestine or large bowel to  suggest ileus or obstruction.     Survey of the visualized bony structures demonstrates no destructive  bony lesions. The visualized lung bases are unremarkable.                                                                      IMPRESSION: Bilateral duplicated  intrarenal collecting systems, only  mild atrophy is seen in the left upper pole and contrast is present in  the dilated left upper pole ureter suggesting significant residual  function. No significant change in mild atrophy since 2017.    NM RENAL SCAN:  FINDINGS: Evaluation of initial blood flow images demonstrates  symmetric blood flow. Delayed images demonstrate minimal stasis in the  right intrarenal collecting system. The dilated duplicated renal  collecting system on the left is less well seen but there is a small  amount of activity there. Little additional affect of Lasix. Function  of the right kidney is calculated at 55%, left kidney 45%. Split  function analysis of the left kidney demonstrates 42% of the activity  as in the upper pole and 58% of activity is in the lower pole.                                                          IMPRESSION: Split function analysis of the left kidney suggests 42%  left kidney function is in the upper pole moiety and 58% is in the  lower moiety.    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  In addition we discussed that the upper pole of the left kidney appears to contribute 42% of that moieties kidney function, indicating good function and that all treatment efforts should be to preserve renal function  -We discussed that given the onset of her symptoms with the nuclear medicine Lasix renal scan, this indicates that her symptoms are related to the obstructive process of the upper pole moiety.  -Reviewed her CT urogram which demonstrates hydroureteronephrosis of the upper  pole moiety leading down to an ectopic ureter.  There does not seem appear to be significant ureterocele component  -We discussed that I have discussed and reviewed her case with several of my colleagues, including our pediatric urologist Dr. Dove as well as my partner Dr. Diane  -We discussed that treatment recommendations at this time focus on maintaining function of the upper pole moiety while improving its drainage and that this would be best served with a left robotic assisted laparoscopic ureteroureterostomy  -We discussed the risks, benefits, and possible side effects of the surgery and the expected postoperative course and outcome.  We discussed the need for ureteral stent placement at the time of surgery for 3 to 4 weeks  -Order for surgery placed in a separate encounter    Time spent: 40 minutes spent on the date of the encounter doing chart review, history and exam, documentation and further activities as noted above.    Brendan Hanson MD   Urology  [facility]  [facility] Phone: [phone]  [facility] Phone: [phone]        Paulette is a 35 year old who is being evaluated via a billable video visit.      How would you like to obtain your AVS? MyChart  If the video visit is dropped, the invitation should be resent by: Text to cell phone: [phone]  Will anyone else be joining your video visit? No      Video-Visit Details    Type of service:  Video Visit    Video Start Time: 11:30    Video End Time:11:50    Originating Location (pt. Location): Home    Distant Location (provider location):  [facility] [place]     Platform used for Video Visit: [employer]    Sincerely,    Brendan Hanson MD

## 2021-03-12 ENCOUNTER — PREP FOR PROCEDURE (OUTPATIENT)
Dept: UROLOGY | Facility: CLINIC | Age: 36
End: 2021-03-12

## 2021-03-12 DIAGNOSIS — Q62.5 DUPLICATED LEFT RENAL COLLECTING SYSTEM: ICD-10-CM

## 2021-03-12 DIAGNOSIS — N13.30 HYDRONEPHROSIS OF LEFT KIDNEY: ICD-10-CM

## 2021-03-12 DIAGNOSIS — R10.9 LEFT FLANK PAIN: Primary | ICD-10-CM

## 2021-03-16 PROBLEM — N13.30 HYDRONEPHROSIS OF LEFT KIDNEY: Status: ACTIVE | Noted: 2021-03-16

## 2021-03-16 PROBLEM — Q62.5 DUPLICATED LEFT RENAL COLLECTING SYSTEM: Status: ACTIVE | Noted: 2021-03-16

## 2021-03-16 PROBLEM — R10.9 LEFT FLANK PAIN: Status: ACTIVE | Noted: 2021-03-16

## 2021-03-22 DIAGNOSIS — Z11.59 ENCOUNTER FOR SCREENING FOR OTHER VIRAL DISEASES: ICD-10-CM

## 2021-04-08 ENCOUNTER — TRANSFERRED RECORDS (OUTPATIENT)
Dept: HEALTH INFORMATION MANAGEMENT | Facility: CLINIC | Age: 36
End: 2021-04-08

## 2021-04-11 ENCOUNTER — ANESTHESIA EVENT (OUTPATIENT)
Dept: SURGERY | Facility: CLINIC | Age: 36
End: 2021-04-11
Payer: COMMERCIAL

## 2021-04-11 NOTE — ANESTHESIA PREPROCEDURE EVALUATION
Anesthesia Pre-Procedure Evaluation    Patient: Paulette Singer   MRN: [id number] : 1985        Preoperative Diagnosis: Left flank pain [R10.9]  Duplicated left renal collecting system [Q62.5]  Hydronephrosis of left kidney [N13.30]   Procedure : Procedure(s):  ROBOTIC ASSISTED URETEROURETEROSTOMY     Past Medical History:   Diagnosis Date     Abnormal CAT scan 2017    bilateral duplicate renal     Cystocele, midline 2018     Headache 2007     Hernia, abdominal      Low ferritin 2016    3rd pregnancy     Migraines age 17    migraine/sinus headaches, better with excedrin     Mixed stress and urge urinary incontinence 2018     Palpitations      Plantar fasciitis 2009    See podiatry consult     Uterine prolapse 2018      Past Surgical History:   Procedure Laterality Date     CYSTOSCOPY N/A 2018    Procedure: CYSTOSCOPY;;  Surgeon: Ed Noriega MD;  Location: [facility] OR     CYSTOSCOPY, INJECT VESICOURETERAL REFLUX GEL N/A 2017    Procedure: CYSTOSCOPY, INJECT VESICOURETERAL REFLUX GEL;  CYSTOSCOPY WITH SUBMUCOSAL INJECTION OF DEFLUX ;  Surgeon: Jeferson White MD;  Location:  OR     CYSTOSCOPY, RETROGRADES, COMBINED Bilateral 2017    Procedure: COMBINED CYSTOSCOPY, RETROGRADES;  CYSTOSCOPY, LEFT URETEROSCOPY, LEFT STENT PLACEMENT & RETRIEVAL, BILATERAL RETROGRADES;  Surgeon: Jeferson White MD;  Location:  OR     CYSTOSCOPY, URETEROSCOPY, COMBINED Left 2017    Procedure: COMBINED CYSTOSCOPY, URETEROSCOPY;;  Surgeon: Jeferson White MD;  Location:  OR     HERNIA REPAIR, INGUINAL RT/LT      bilateral as a baby     HYSTERECTOMY VAGINAL N/A 2018    Procedure: HYSTERECTOMY VAGINAL;  Total Vaginal Hysterectomy, Bilateral salpingectomy;  Surgeon: Ed Noriega MD;  Location: [facility] OR     INSERT INTRAUTERINE DEVICE  2009    Mirena IUD under anesthetic     MOUTH SURGERY      [place] teeth     PELVIS LAPAROSCOPY,DX  2009    normal      ZZHC UGI ENDOSCOPY, SIMPLE EXAM  09/11/2007      Allergies   Allergen Reactions     Amoxicillin Rash     Penicillins Rash      Social History     Tobacco Use     Smoking status: Never Smoker     Smokeless tobacco: Never Used   Substance Use Topics     Alcohol use: Yes     Alcohol/week: 0.0 standard drinks     Comment: glass a week      Wt Readings from Last 1 Encounters:   03/01/21 72.6 kg (160 lb)        Anesthesia Evaluation   Pt has had prior anesthetic. Type: General.    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 with evidence of an obstructive ectopic ureter    Urinary stress incontinence, pelvic floor dysfunction      Endo:       Psychiatric/Substance Use:       Infectious Disease:       Malignancy:       Other:               OUTSIDE LABS:  CBC:   Lab Results   Component Value Date    WBC 8.0 04/18/2017    WBC 8.9 03/17/2017    HGB 14.3 04/18/2017    HGB 13.7 03/17/2017    HCT 43.3 04/18/2017    HCT 42.7 03/17/2017     04/18/2017     03/17/2017     BMP:   Lab Results   Component Value Date     03/17/2017     11/01/2015    POTASSIUM 4.1 03/17/2017    POTASSIUM 3.8 11/01/2015    CHLORIDE 107 03/17/2017    CHLORIDE 109 11/01/2015    CO2 26 03/17/2017    CO2 26 11/01/2015    BUN 26 03/17/2017    BUN 14 11/01/2015    CR 0.94 03/17/2017    CR 0.87 11/01/2015    GLC 90 03/17/2017    GLC 95 11/01/2015     COAGS:   Lab Results   Component Value Date    INR 0.66 10/19/2014     POC:   Lab Results   Component Value Date    HCG  05/03/2010     Negative   This test provides a presumptive diagnosis of pregnancy or non-pregnancy. A   confirmed pregnancy diagnosis should only be made by a physician  after all   clinical and laboratory findings have been evaluated.    HCGS Negative 06/07/2017     HEPATIC:   Lab Results   Component Value Date    ALBUMIN 2.7 (L) 12/29/2016    PROTTOTAL 6.3 (L) 12/29/2016    ALT 16 12/29/2016    AST 18 12/29/2016    ALKPHOS 143 12/29/2016    BILITOTAL 0.3 12/29/2016     OTHER:   Lab Results   Component Value Date    GAUTAM 8.6 03/17/2017    LIPASE 86 06/19/2006    TSH 0.25 (L) 07/25/2017    T4 0.90 07/25/2017    CRP 49.4 (H) 03/07/2017    SED 5 11/04/2015       Anesthesia Plan    ASA Status:  1   NPO Status:  NPO Appropriate    Anesthesia Type: General.     - Airway: ETT   Induction: Intravenous, Propofol.   Maintenance: TIVA.        Consents    Anesthesia Plan(s) and associated risks, benefits, and realistic alternatives discussed. Questions answered and patient/representative(s) expressed understanding.     - Discussed with:  Patient      - Extended Intubation/Ventilatory Support Discussed: No.      - Patient is DNR/DNI Status: No    Use of blood products discussed: Yes.     - Discussed with: Patient.     - Consented: consented to blood products     Postoperative Care    Pain management: IV analgesics, Multi-modal analgesia.   PONV prophylaxis: Ondansetron (or other 5HT-3), Dexamethasone or Solumedrol     Comments:    ETT    Propofol TIVA            Steve Diane DO

## 2021-04-12 ENCOUNTER — HOSPITAL ENCOUNTER (OUTPATIENT)
Facility: CLINIC | Age: 36
Discharge: HOME | End: 2021-04-13
Attending: UROLOGY | Admitting: UROLOGY
Payer: COMMERCIAL

## 2021-04-12 ENCOUNTER — APPOINTMENT (OUTPATIENT)
Dept: GENERAL RADIOLOGY | Facility: CLINIC | Age: 36
End: 2021-04-12
Attending: STUDENT IN AN ORGANIZED HEALTH CARE EDUCATION/TRAINING PROGRAM
Payer: COMMERCIAL

## 2021-04-12 ENCOUNTER — ANESTHESIA (OUTPATIENT)
Dept: SURGERY | Facility: CLINIC | Age: 36
End: 2021-04-12
Payer: COMMERCIAL

## 2021-04-12 DIAGNOSIS — N13.30 HYDRONEPHROSIS OF LEFT KIDNEY: ICD-10-CM

## 2021-04-12 DIAGNOSIS — R10.9 LEFT FLANK PAIN: ICD-10-CM

## 2021-04-12 DIAGNOSIS — Q62.5 DUPLICATED LEFT RENAL COLLECTING SYSTEM: ICD-10-CM

## 2021-04-12 DIAGNOSIS — Q62.5 DUPLICATED LEFT RENAL COLLECTING SYSTEM: Primary | ICD-10-CM

## 2021-04-12 PROCEDURE — 360N000080 HC SURGERY LEVEL 7, PER MIN: Performed by: UROLOGY

## 2021-04-12 PROCEDURE — 250N000025 HC SEVOFLURANE, PER MIN: Performed by: UROLOGY

## 2021-04-12 PROCEDURE — 250N000012 HC RX MED GY IP 250 OP 636 PS 637: Performed by: ANESTHESIOLOGY

## 2021-04-12 PROCEDURE — 250N000011 HC RX IP 250 OP 636: Performed by: UROLOGY

## 2021-04-12 PROCEDURE — 250N000011 HC RX IP 250 OP 636: Performed by: NURSE ANESTHETIST, CERTIFIED REGISTERED

## 2021-04-12 PROCEDURE — 250N000009 HC RX 250: Performed by: ANESTHESIOLOGY

## 2021-04-12 PROCEDURE — 258N000003 HC RX IP 258 OP 636: Performed by: STUDENT IN AN ORGANIZED HEALTH CARE EDUCATION/TRAINING PROGRAM

## 2021-04-12 PROCEDURE — 94002 VENT MGMT INPAT INIT DAY: CPT

## 2021-04-12 PROCEDURE — 258N000003 HC RX IP 258 OP 636: Performed by: ANESTHESIOLOGY

## 2021-04-12 PROCEDURE — 250N000009 HC RX 250: Performed by: NURSE ANESTHETIST, CERTIFIED REGISTERED

## 2021-04-12 PROCEDURE — 250N000013 HC RX MED GY IP 250 OP 250 PS 637: Performed by: ANESTHESIOLOGY

## 2021-04-12 PROCEDURE — C1769 GUIDE WIRE: HCPCS | Performed by: UROLOGY

## 2021-04-12 PROCEDURE — 50760 URETEROURETEROSTOMY: CPT | Performed by: UROLOGY

## 2021-04-12 PROCEDURE — 250N000009 HC RX 250: Performed by: UROLOGY

## 2021-04-12 PROCEDURE — 999N000157 HC STATISTIC RCP TIME EA 10 MIN

## 2021-04-12 PROCEDURE — 999N000065 XR ABDOMEN 1 VW

## 2021-04-12 PROCEDURE — 250N000011 HC RX IP 250 OP 636: Performed by: ANESTHESIOLOGY

## 2021-04-12 PROCEDURE — 258N000003 HC RX IP 258 OP 636: Performed by: NURSE ANESTHETIST, CERTIFIED REGISTERED

## 2021-04-12 PROCEDURE — 250N000013 HC RX MED GY IP 250 OP 250 PS 637: Performed by: STUDENT IN AN ORGANIZED HEALTH CARE EDUCATION/TRAINING PROGRAM

## 2021-04-12 PROCEDURE — 272N000001 HC OR GENERAL SUPPLY STERILE: Performed by: UROLOGY

## 2021-04-12 PROCEDURE — C2617 STENT, NON-COR, TEM W/O DEL: HCPCS | Performed by: UROLOGY

## 2021-04-12 PROCEDURE — 999N000141 HC STATISTIC PRE-PROCEDURE NURSING ASSESSMENT: Performed by: UROLOGY

## 2021-04-12 PROCEDURE — 370N000017 HC ANESTHESIA TECHNICAL FEE, PER MIN: Performed by: UROLOGY

## 2021-04-12 PROCEDURE — 250N000011 HC RX IP 250 OP 636: Performed by: STUDENT IN AN ORGANIZED HEALTH CARE EDUCATION/TRAINING PROGRAM

## 2021-04-12 PROCEDURE — 250N000009 HC RX 250: Performed by: STUDENT IN AN ORGANIZED HEALTH CARE EDUCATION/TRAINING PROGRAM

## 2021-04-12 PROCEDURE — 258N000001 HC RX 258: Performed by: UROLOGY

## 2021-04-12 PROCEDURE — 710N000009 HC RECOVERY PHASE 1, LEVEL 1, PER MIN: Performed by: UROLOGY

## 2021-04-12 PROCEDURE — 52332 CYSTOSCOPY AND TREATMENT: CPT | Mod: LT | Performed by: UROLOGY

## 2021-04-12 DEVICE — URETERAL STENT
Type: IMPLANTABLE DEVICE | Site: URETER | Status: NON-FUNCTIONAL
Brand: POLARIS™ ULTRA
Removed: 2021-05-10

## 2021-04-12 RX ORDER — ACETAMINOPHEN 325 MG/1
TABLET ORAL
Status: DISCONTINUED
Start: 2021-04-12 | End: 2021-04-12 | Stop reason: WASHOUT

## 2021-04-12 RX ORDER — GENTAMICIN SULFATE 80 MG/100ML
80 INJECTION, SOLUTION INTRAVENOUS
Status: COMPLETED | OUTPATIENT
Start: 2021-04-12 | End: 2021-04-12

## 2021-04-12 RX ORDER — SODIUM CHLORIDE, SODIUM LACTATE, POTASSIUM CHLORIDE, CALCIUM CHLORIDE 600; 310; 30; 20 MG/100ML; MG/100ML; MG/100ML; MG/100ML
INJECTION, SOLUTION INTRAVENOUS CONTINUOUS
Status: DISCONTINUED | OUTPATIENT
Start: 2021-04-12 | End: 2021-04-12 | Stop reason: HOSPADM

## 2021-04-12 RX ORDER — LIDOCAINE 40 MG/G
CREAM TOPICAL
Status: DISCONTINUED | OUTPATIENT
Start: 2021-04-12 | End: 2021-04-13 | Stop reason: HOSPADM

## 2021-04-12 RX ORDER — ONDANSETRON 2 MG/ML
4 INJECTION INTRAMUSCULAR; INTRAVENOUS EVERY 6 HOURS PRN
Status: DISCONTINUED | OUTPATIENT
Start: 2021-04-12 | End: 2021-04-13 | Stop reason: HOSPADM

## 2021-04-12 RX ORDER — ATROPA BELLADONNA AND OPIUM 16.2; 3 MG/1; MG/1
30 SUPPOSITORY RECTAL EVERY 8 HOURS PRN
Status: DISCONTINUED | OUTPATIENT
Start: 2021-04-12 | End: 2021-04-13 | Stop reason: HOSPADM

## 2021-04-12 RX ORDER — ACETAMINOPHEN 325 MG/1
975 TABLET ORAL ONCE
Status: COMPLETED | OUTPATIENT
Start: 2021-04-12 | End: 2021-04-12

## 2021-04-12 RX ORDER — DEXAMETHASONE SODIUM PHOSPHATE 4 MG/ML
INJECTION, SOLUTION INTRA-ARTICULAR; INTRALESIONAL; INTRAMUSCULAR; INTRAVENOUS; SOFT TISSUE PRN
Status: DISCONTINUED | OUTPATIENT
Start: 2021-04-12 | End: 2021-04-12

## 2021-04-12 RX ORDER — FENTANYL CITRATE 50 UG/ML
25-50 INJECTION, SOLUTION INTRAMUSCULAR; INTRAVENOUS
Status: DISCONTINUED | OUTPATIENT
Start: 2021-04-12 | End: 2021-04-12 | Stop reason: HOSPADM

## 2021-04-12 RX ORDER — OXYBUTYNIN CHLORIDE 5 MG/1
5 TABLET ORAL 3 TIMES DAILY PRN
Status: DISCONTINUED | OUTPATIENT
Start: 2021-04-12 | End: 2021-04-13 | Stop reason: HOSPADM

## 2021-04-12 RX ORDER — NALOXONE HYDROCHLORIDE 0.4 MG/ML
0.4 INJECTION, SOLUTION INTRAMUSCULAR; INTRAVENOUS; SUBCUTANEOUS
Status: DISCONTINUED | OUTPATIENT
Start: 2021-04-12 | End: 2021-04-13 | Stop reason: HOSPADM

## 2021-04-12 RX ORDER — ASPIRIN 81 MG
100 TABLET, DELAYED RELEASE (ENTERIC COATED) ORAL DAILY
Qty: 30 TABLET | Refills: 0 | Status: SHIPPED | OUTPATIENT
Start: 2021-04-12 | End: 2021-05-12

## 2021-04-12 RX ORDER — NALOXONE HYDROCHLORIDE 0.4 MG/ML
0.2 INJECTION, SOLUTION INTRAMUSCULAR; INTRAVENOUS; SUBCUTANEOUS
Status: DISCONTINUED | OUTPATIENT
Start: 2021-04-12 | End: 2021-04-12 | Stop reason: HOSPADM

## 2021-04-12 RX ORDER — HEPARIN SODIUM 5000 [USP'U]/.5ML
5000 INJECTION, SOLUTION INTRAVENOUS; SUBCUTANEOUS EVERY 8 HOURS
Status: DISCONTINUED | OUTPATIENT
Start: 2021-04-13 | End: 2021-04-13 | Stop reason: HOSPADM

## 2021-04-12 RX ORDER — PROPOFOL 10 MG/ML
INJECTION, EMULSION INTRAVENOUS PRN
Status: DISCONTINUED | OUTPATIENT
Start: 2021-04-12 | End: 2021-04-12

## 2021-04-12 RX ORDER — KETOROLAC TROMETHAMINE 15 MG/ML
15 INJECTION, SOLUTION INTRAMUSCULAR; INTRAVENOUS EVERY 6 HOURS
Status: COMPLETED | OUTPATIENT
Start: 2021-04-12 | End: 2021-04-13

## 2021-04-12 RX ORDER — OXYBUTYNIN CHLORIDE 5 MG/1
5 TABLET, EXTENDED RELEASE ORAL DAILY
Qty: 10 TABLET | Refills: 0 | Status: SHIPPED | OUTPATIENT
Start: 2021-04-12 | End: 2021-04-12

## 2021-04-12 RX ORDER — ONDANSETRON 2 MG/ML
4 INJECTION INTRAMUSCULAR; INTRAVENOUS EVERY 30 MIN PRN
Status: DISCONTINUED | OUTPATIENT
Start: 2021-04-12 | End: 2021-04-12 | Stop reason: HOSPADM

## 2021-04-12 RX ORDER — ONDANSETRON 4 MG/1
4 TABLET, ORALLY DISINTEGRATING ORAL EVERY 6 HOURS PRN
Status: DISCONTINUED | OUTPATIENT
Start: 2021-04-12 | End: 2021-04-13 | Stop reason: HOSPADM

## 2021-04-12 RX ORDER — TAMSULOSIN HYDROCHLORIDE 0.4 MG/1
0.4 CAPSULE ORAL DAILY
Qty: 10 CAPSULE | Refills: 0 | Status: SHIPPED | OUTPATIENT
Start: 2021-04-12 | End: 2021-04-12

## 2021-04-12 RX ORDER — PROPOFOL 10 MG/ML
INJECTION, EMULSION INTRAVENOUS CONTINUOUS PRN
Status: DISCONTINUED | OUTPATIENT
Start: 2021-04-12 | End: 2021-04-12

## 2021-04-12 RX ORDER — NALOXONE HYDROCHLORIDE 0.4 MG/ML
0.4 INJECTION, SOLUTION INTRAMUSCULAR; INTRAVENOUS; SUBCUTANEOUS
Status: DISCONTINUED | OUTPATIENT
Start: 2021-04-12 | End: 2021-04-12 | Stop reason: HOSPADM

## 2021-04-12 RX ORDER — VANCOMYCIN HYDROCHLORIDE 1 G/200ML
1000 INJECTION, SOLUTION INTRAVENOUS
Status: COMPLETED | OUTPATIENT
Start: 2021-04-12 | End: 2021-04-12

## 2021-04-12 RX ORDER — LIDOCAINE HYDROCHLORIDE 20 MG/ML
INJECTION, SOLUTION INFILTRATION; PERINEURAL PRN
Status: DISCONTINUED | OUTPATIENT
Start: 2021-04-12 | End: 2021-04-12

## 2021-04-12 RX ORDER — OXYCODONE HYDROCHLORIDE 5 MG/1
5-10 TABLET ORAL
Status: DISCONTINUED | OUTPATIENT
Start: 2021-04-12 | End: 2021-04-13 | Stop reason: HOSPADM

## 2021-04-12 RX ORDER — NEOSTIGMINE METHYLSULFATE 1 MG/ML
VIAL (ML) INJECTION PRN
Status: DISCONTINUED | OUTPATIENT
Start: 2021-04-12 | End: 2021-04-12

## 2021-04-12 RX ORDER — NALOXONE HYDROCHLORIDE 0.4 MG/ML
0.2 INJECTION, SOLUTION INTRAMUSCULAR; INTRAVENOUS; SUBCUTANEOUS
Status: DISCONTINUED | OUTPATIENT
Start: 2021-04-12 | End: 2021-04-13 | Stop reason: HOSPADM

## 2021-04-12 RX ORDER — LIDOCAINE 40 MG/G
CREAM TOPICAL
Status: DISCONTINUED | OUTPATIENT
Start: 2021-04-12 | End: 2021-04-12 | Stop reason: HOSPADM

## 2021-04-12 RX ORDER — GLYCOPYRROLATE 0.2 MG/ML
INJECTION, SOLUTION INTRAMUSCULAR; INTRAVENOUS PRN
Status: DISCONTINUED | OUTPATIENT
Start: 2021-04-12 | End: 2021-04-12

## 2021-04-12 RX ORDER — TAMSULOSIN HYDROCHLORIDE 0.4 MG/1
0.4 CAPSULE ORAL DAILY
Qty: 30 CAPSULE | Refills: 0 | Status: ON HOLD | OUTPATIENT
Start: 2021-04-12 | End: 2021-05-10

## 2021-04-12 RX ORDER — POLYETHYLENE GLYCOL 3350 17 G/17G
17 POWDER, FOR SOLUTION ORAL DAILY
Status: DISCONTINUED | OUTPATIENT
Start: 2021-04-13 | End: 2021-04-13 | Stop reason: HOSPADM

## 2021-04-12 RX ORDER — ONDANSETRON 2 MG/ML
INJECTION INTRAMUSCULAR; INTRAVENOUS PRN
Status: DISCONTINUED | OUTPATIENT
Start: 2021-04-12 | End: 2021-04-12

## 2021-04-12 RX ORDER — OXYCODONE HYDROCHLORIDE 5 MG/1
5 TABLET ORAL EVERY 6 HOURS PRN
Qty: 9 TABLET | Refills: 0 | Status: ON HOLD | OUTPATIENT
Start: 2021-04-12 | End: 2021-05-10

## 2021-04-12 RX ORDER — BUPIVACAINE HYDROCHLORIDE 2.5 MG/ML
INJECTION, SOLUTION INFILTRATION; PERINEURAL PRN
Status: DISCONTINUED | OUTPATIENT
Start: 2021-04-12 | End: 2021-04-12 | Stop reason: HOSPADM

## 2021-04-12 RX ORDER — OXYBUTYNIN CHLORIDE 5 MG/1
5 TABLET, EXTENDED RELEASE ORAL DAILY
Qty: 30 TABLET | Refills: 0 | Status: ON HOLD | OUTPATIENT
Start: 2021-04-12 | End: 2021-05-10

## 2021-04-12 RX ORDER — KETOROLAC TROMETHAMINE 10 MG/1
10 TABLET, FILM COATED ORAL EVERY 6 HOURS
Qty: 20 TABLET | Refills: 0 | Status: SHIPPED | OUTPATIENT
Start: 2021-04-12 | End: 2021-04-17

## 2021-04-12 RX ORDER — MAGNESIUM HYDROXIDE 1200 MG/15ML
LIQUID ORAL PRN
Status: DISCONTINUED | OUTPATIENT
Start: 2021-04-12 | End: 2021-04-12 | Stop reason: HOSPADM

## 2021-04-12 RX ORDER — FENTANYL CITRATE 50 UG/ML
INJECTION, SOLUTION INTRAMUSCULAR; INTRAVENOUS PRN
Status: DISCONTINUED | OUTPATIENT
Start: 2021-04-12 | End: 2021-04-12

## 2021-04-12 RX ORDER — ACETAMINOPHEN 325 MG/1
975 TABLET ORAL EVERY 6 HOURS
Status: DISCONTINUED | OUTPATIENT
Start: 2021-04-12 | End: 2021-04-13 | Stop reason: HOSPADM

## 2021-04-12 RX ORDER — HYDROMORPHONE HYDROCHLORIDE 1 MG/ML
.3-.5 INJECTION, SOLUTION INTRAMUSCULAR; INTRAVENOUS; SUBCUTANEOUS EVERY 10 MIN PRN
Status: DISCONTINUED | OUTPATIENT
Start: 2021-04-12 | End: 2021-04-12 | Stop reason: HOSPADM

## 2021-04-12 RX ORDER — AMOXICILLIN 250 MG
1 CAPSULE ORAL AT BEDTIME
Status: DISCONTINUED | OUTPATIENT
Start: 2021-04-12 | End: 2021-04-13 | Stop reason: HOSPADM

## 2021-04-12 RX ORDER — PROCHLORPERAZINE MALEATE 10 MG
10 TABLET ORAL EVERY 6 HOURS PRN
Status: DISCONTINUED | OUTPATIENT
Start: 2021-04-12 | End: 2021-04-13 | Stop reason: HOSPADM

## 2021-04-12 RX ORDER — VANCOMYCIN HYDROCHLORIDE 1 G/200ML
1000 INJECTION, SOLUTION INTRAVENOUS SEE ADMIN INSTRUCTIONS
Status: DISCONTINUED | OUTPATIENT
Start: 2021-04-12 | End: 2021-04-12 | Stop reason: HOSPADM

## 2021-04-12 RX ORDER — ONDANSETRON 4 MG/1
4 TABLET, ORALLY DISINTEGRATING ORAL EVERY 30 MIN PRN
Status: DISCONTINUED | OUTPATIENT
Start: 2021-04-12 | End: 2021-04-12 | Stop reason: HOSPADM

## 2021-04-12 RX ORDER — SCOLOPAMINE TRANSDERMAL SYSTEM 1 MG/1
1 PATCH, EXTENDED RELEASE TRANSDERMAL ONCE
Status: COMPLETED | OUTPATIENT
Start: 2021-04-12 | End: 2021-04-13

## 2021-04-12 RX ORDER — APREPITANT 40 MG/1
40 CAPSULE ORAL DAILY
Status: DISCONTINUED | OUTPATIENT
Start: 2021-04-12 | End: 2021-04-12 | Stop reason: HOSPADM

## 2021-04-12 RX ORDER — HYDROXYZINE HYDROCHLORIDE 25 MG/1
25 TABLET, FILM COATED ORAL ONCE
Status: COMPLETED | OUTPATIENT
Start: 2021-04-12 | End: 2021-04-12

## 2021-04-12 RX ORDER — SODIUM CHLORIDE 9 MG/ML
INJECTION, SOLUTION INTRAVENOUS CONTINUOUS
Status: DISCONTINUED | OUTPATIENT
Start: 2021-04-12 | End: 2021-04-13

## 2021-04-12 RX ORDER — MEPERIDINE HYDROCHLORIDE 25 MG/ML
12.5 INJECTION INTRAMUSCULAR; INTRAVENOUS; SUBCUTANEOUS
Status: DISCONTINUED | OUTPATIENT
Start: 2021-04-12 | End: 2021-04-12 | Stop reason: HOSPADM

## 2021-04-12 RX ADMIN — PROPOFOL 150 MCG/KG/MIN: 10 INJECTION, EMULSION INTRAVENOUS at 07:50

## 2021-04-12 RX ADMIN — ROCURONIUM BROMIDE 50 MG: 10 INJECTION INTRAVENOUS at 07:52

## 2021-04-12 RX ADMIN — ROCURONIUM BROMIDE 10 MG: 10 INJECTION INTRAVENOUS at 09:37

## 2021-04-12 RX ADMIN — DEXAMETHASONE SODIUM PHOSPHATE 4 MG: 4 INJECTION, SOLUTION INTRA-ARTICULAR; INTRALESIONAL; INTRAMUSCULAR; INTRAVENOUS; SOFT TISSUE at 08:33

## 2021-04-12 RX ADMIN — MIDAZOLAM 2 MG: 1 INJECTION INTRAMUSCULAR; INTRAVENOUS at 07:47

## 2021-04-12 RX ADMIN — GENTAMICIN SULFATE 80 MG: 80 INJECTION, SOLUTION INTRAVENOUS at 08:00

## 2021-04-12 RX ADMIN — PROPOFOL 200 MG: 10 INJECTION, EMULSION INTRAVENOUS at 07:50

## 2021-04-12 RX ADMIN — KETOROLAC TROMETHAMINE 15 MG: 15 INJECTION, SOLUTION INTRAMUSCULAR; INTRAVENOUS at 16:34

## 2021-04-12 RX ADMIN — LIDOCAINE HYDROCHLORIDE 80 MG: 20 INJECTION, SOLUTION INFILTRATION; PERINEURAL at 07:50

## 2021-04-12 RX ADMIN — SODIUM CHLORIDE, POTASSIUM CHLORIDE, SODIUM LACTATE AND CALCIUM CHLORIDE: 600; 310; 30; 20 INJECTION, SOLUTION INTRAVENOUS at 06:26

## 2021-04-12 RX ADMIN — FENTANYL CITRATE 50 MCG: 50 INJECTION, SOLUTION INTRAMUSCULAR; INTRAVENOUS at 07:50

## 2021-04-12 RX ADMIN — PHENYLEPHRINE HYDROCHLORIDE 50 MCG: 10 INJECTION INTRAVENOUS at 08:14

## 2021-04-12 RX ADMIN — FENTANYL CITRATE 50 MCG: 50 INJECTION, SOLUTION INTRAMUSCULAR; INTRAVENOUS at 08:24

## 2021-04-12 RX ADMIN — ROCURONIUM BROMIDE 10 MG: 10 INJECTION INTRAVENOUS at 09:53

## 2021-04-12 RX ADMIN — KETOROLAC TROMETHAMINE 15 MG: 15 INJECTION, SOLUTION INTRAMUSCULAR; INTRAVENOUS at 21:07

## 2021-04-12 RX ADMIN — GLYCOPYRROLATE 0.6 MG: 0.2 INJECTION, SOLUTION INTRAMUSCULAR; INTRAVENOUS at 11:17

## 2021-04-12 RX ADMIN — SENNOSIDES AND DOCUSATE SODIUM 1 TABLET: 8.6; 5 TABLET ORAL at 21:17

## 2021-04-12 RX ADMIN — NEOSTIGMINE METHYLSULFATE 3.5 MG: 1 INJECTION, SOLUTION INTRAVENOUS at 11:17

## 2021-04-12 RX ADMIN — ACETAMINOPHEN 975 MG: 325 TABLET, FILM COATED ORAL at 06:23

## 2021-04-12 RX ADMIN — ROCURONIUM BROMIDE 10 MG: 10 INJECTION INTRAVENOUS at 10:55

## 2021-04-12 RX ADMIN — ACETAMINOPHEN 975 MG: 325 TABLET, FILM COATED ORAL at 21:05

## 2021-04-12 RX ADMIN — SCOPOLAMINE 1 PATCH: 1 PATCH TRANSDERMAL at 07:26

## 2021-04-12 RX ADMIN — ACETAMINOPHEN 975 MG: 325 TABLET, FILM COATED ORAL at 16:35

## 2021-04-12 RX ADMIN — ROCURONIUM BROMIDE 20 MG: 10 INJECTION INTRAVENOUS at 10:30

## 2021-04-12 RX ADMIN — HYDROXYZINE HYDROCHLORIDE 25 MG: 25 TABLET, FILM COATED ORAL at 06:24

## 2021-04-12 RX ADMIN — ATROPA BELLADONNA AND OPIUM 1 SUPPOSITORY: 16.2; 3 SUPPOSITORY RECTAL at 20:55

## 2021-04-12 RX ADMIN — ROCURONIUM BROMIDE 30 MG: 10 INJECTION INTRAVENOUS at 08:59

## 2021-04-12 RX ADMIN — DEXMEDETOMIDINE HYDROCHLORIDE 0.5 MCG/KG/HR: 100 INJECTION, SOLUTION INTRAVENOUS at 07:50

## 2021-04-12 RX ADMIN — ONDANSETRON 4 MG: 2 INJECTION INTRAMUSCULAR; INTRAVENOUS at 11:14

## 2021-04-12 RX ADMIN — ROCURONIUM BROMIDE 10 MG: 10 INJECTION INTRAVENOUS at 10:07

## 2021-04-12 RX ADMIN — SODIUM CHLORIDE: 9 INJECTION, SOLUTION INTRAVENOUS at 15:00

## 2021-04-12 RX ADMIN — SODIUM CHLORIDE, POTASSIUM CHLORIDE, SODIUM LACTATE AND CALCIUM CHLORIDE: 600; 310; 30; 20 INJECTION, SOLUTION INTRAVENOUS at 12:52

## 2021-04-12 RX ADMIN — ROCURONIUM BROMIDE 50 MG: 10 INJECTION INTRAVENOUS at 08:18

## 2021-04-12 RX ADMIN — APREPITANT 40 MG: 40 CAPSULE ORAL at 07:25

## 2021-04-12 RX ADMIN — HYDROMORPHONE HYDROCHLORIDE 0.3 MG: 1 INJECTION, SOLUTION INTRAMUSCULAR; INTRAVENOUS; SUBCUTANEOUS at 14:04

## 2021-04-12 RX ADMIN — VANCOMYCIN HYDROCHLORIDE 1000 MG: 1 INJECTION, SOLUTION INTRAVENOUS at 06:26

## 2021-04-12 RX ADMIN — GLYCOPYRROLATE 0.2 MG: 0.2 INJECTION, SOLUTION INTRAMUSCULAR; INTRAVENOUS at 08:26

## 2021-04-12 ASSESSMENT — MIFFLIN-ST. JEOR: SCORE: 1463.84

## 2021-04-12 NOTE — OP NOTE
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 ROBOTIC ASSISTED LAPAROSCOPIC URETEROURETEROTOMY  2. FLEXIBLE CYSTOSCOPY  3. URETERAL STENT PLACEMENT     STAFF SURGEON: Brendan Hanson MD  RESIDENT SURGEON: Cal Waldrop MD  ASSISTANT: Sudha Kaur  ANESTHESIA: General.   ESTIMATED BLOOD LOSS: 5 mL.   DRAINS AND TUBES: LEFT 5fr x 26cm ureteral stent x 2 (one to upper pole moiety and one to lower pole moiety), 16fr berman catheter  COMPLICATIONS: None.   DISPOSITION: PACU.   SPECIMENS OBTAINED: None  SIGNIFICANT FINDINGS: Duplicated collecting system with evidence of hydroureteronephrosis of the upper pole moiety. Ureteral stent placement confirmed with cystoscopy.      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 She was counseled on treatment options and elected to proceed with the above surgery.     OPERATION PERFORMED:   Informed consent was obtained and the patient was brought to the operating room where general anesthesia was induced. The patient was given appropriate preoperative antibiotics and positioned right lateral decubitus. We then performed a timeout, verifying the correct patient's site and procedure to be performed.      Then, we made a horizontal incision along Baldemar's lines, in line with the tip of  the eleventh rib towards the umbilicus. This was carried down with electrocautery. The Veress needle inserted and the abdomen was insufflated. We then placed our robotic ports in the standard configuration. Two robotic ports as well as an assistant port between the right arm and the camera port. We then proceeded to mobilize the colon medially using both sharp and blunt dissection.  A duplicated left collecting system was identified.  The upper pole ureter was noted to have significant hydroureteronephrosis with a normal caliber lower pole ureter.  This dissection was carried up to the level of the renal vein with elevation of the gonadal vein.  The ureters were noted to join at a common sheath just below the lower pole of the kidney.  The lower pole moiety renal pelvis was not dilated and decision was made to perform a ureteroureterostomy just above the level that the ureters were draining into a common sheath.  The upper pole ureter was transected and spatulated 1.5 cm.  The lower pole ureter was partially incised and the anterior wall was opened about 1.5 cm.  The anastomosis was started with 4-0 Vicryl suture starting at the proximal apex.  The back wall of the anastomosis was completed and then a 5 Lithuanian by 26 cm ureteral stent was advanced down the lower pole ureter and the wire was removed.  The wire was replaced through the sidehole of the stent and the stent was positioned into the lower pole moiety.  A second stent was then passed down the lower pole ureter and positioned in the upper pole moiety.  Flexible cystoscopy was performed with evidence of only one stent in the bladder.  The stent going to the upper pole moiety was not sufficiently advanced down the ureter and was removed.  This was replaced in a retrograde fashion.  The cystoscope was replaced and the bladder had ensured to coils within the bladder and the stent was properly positioned in the upper pole moiety.  The anastomosis was then  completed with a running 4-0 Vicryl.  Both ureters were actively peristalsing and the repair was noted to be watertight.  On previous work-up there was no evidence of vesicoureteral reflux to the upper pole moiety so the transected ureter was left open.  The assistant port was closed with a Richard Lux device.  A FINA drain was passed through the lower quadrant robotic port site.     We then removed instruments and undocked the robotic port site and was secured to the skin with a 3-0 nylon. The incisions were then irrigated. We changed our gloves and used fresh instruments to close the skin in a subcuticular fashion. Dermabond was applied to the skin incisions. Local anesthetic was injected into all the wounds. The patient was then repositioned in a supine position and awoken from anesthesia, having tolerated the procedure well.  Shaffer was replaced in the bladder.  The patient was taken to the PACU in good condition.     I performed the entire procedure with assistance.    Brendan Hanson MD   Urology  [facility]  Clinic Phone [phone]

## 2021-04-12 NOTE — ANESTHESIA POSTPROCEDURE EVALUATION
Patient: Paulette Singer    Procedure(s):  ROBOTIC ASSISTED LEFT URETEROURETEROSTOMY, CYSTOSCOPY, LEFT URETERAL STENT PLACEMENT    Diagnosis:Left flank pain [R10.9]  Duplicated left renal collecting system [Q62.5]  Hydronephrosis of left kidney [N13.30]  Diagnosis Additional Information: No value filed.    Anesthesia Type:  General    Note:  Disposition: Inpatient   Postop Pain Control: Uneventful            Sign Out: Well controlled pain   PONV: No   Neuro/Psych: Uneventful            Sign Out: Acceptable/Baseline neuro status   Airway/Respiratory: Uneventful            Sign Out: Acceptable/Baseline resp. status   CV/Hemodynamics: Uneventful            Sign Out: Acceptable CV status   Other NRE: NONE   DID A NON-ROUTINE EVENT OCCUR? No         Last vitals:  Vitals:    04/12/21 1450 04/12/21 1523 04/12/21 1757   BP: 108/52 109/59    Pulse: 76 64    Resp: 28 13    Temp: 36.4  C (97.5  F) 35.8  C (96.4  F)    SpO2: 99% 100% 96%       Last vitals prior to Anesthesia Care Transfer:  CRNA VITALS  4/12/2021 1113 - 4/12/2021 1213      4/12/2021             NIBP:  101/71    Pulse:  (!) 47    NIBP Mean:  83    SpO2:  100 %    Resp Rate (observed):  16    Resp Rate (set):  10          Electronically Signed By: Steve Diane DO  April 12, 2021  6:04 PM

## 2021-04-12 NOTE — PROGRESS NOTES
Pt came back vented from OR. RT put pt on transport vent with settings of cmv 14/420/+5 35%. ETT 7.0, @ 21 lips. RT will continue to monitor. Possible extubation in PACU.

## 2021-04-12 NOTE — DISCHARGE INSTRUCTIONS
POSTOPERATIVE INSTRUCTIONS    Diagnosis-------------------------------   LEFT duplicated collecting system    Procedure-------------------------------  Procedure(s) (LRB):  ROBOTIC ASSISTED LEFT URETEROURETEROSTOMY, CYSTOSCOPY, LEFT URETERAL STENT PLACEMENT (Left)      Findings--------------------------------  Duplicated collecting system with evidence of hydroureteronephrosis of the upper pole moiety. Ureteral stent placement confirmed with cystoscopy.     Home-going instructions-----------------         Activity Limitation:     - No driving or operating heavy machinery while on narcotic pain medication.   - No strenuous exercise for 6 weeks.   - No lifting, pushing, pulling more than 10 pounds for 6 weeks. Take care when pushing with your arms to stand up.   - Do not strain your belly area. When you bend, sit up or twice, you could strain the area around your incision.   - Do not strain with bowel movements.   - Do not drive until you can press the brake pedal quickly and fully without pain.   - Do not operate a motor vehicle while taking narcotic pain medications    FOLLOW THESE INSTRUCTIONS AS INDICATED BELOW:  - Observe operative area for signs of excessive bleeding.  - You may shower.  - Increase fluid intake to promote clear urine.  - Resume usual diet as tolerated    What to expect while recovering----------- TUBES AND DRAINS:  - 2 URETERAL STENTs have been placed that allows urine to flow unobstructed from your kidney into your bladder.  The stent has a curl in the kidney and a curl in the bladder.  The curl in the bladder can cause some urgency and frequency of urination as well as some mild blood in the urine.  The curl in the kidney can cause some mild flank discomfort.  This may be more noticeable when you urinate.  A URETERAL STENT is meant to be left in temporarily.  It must be removed or changed no later than 3 months after it's insertion.  If it's not removed it can result in stone overgrowth on the  "stent that can cause pain, infection, and can be very difficult to remove.        What to expect while recovering----------- WOUND CARE:  - You may shower and get incisions wet starting 48 hrs after surgery.  - Do not scrub incisions or submerge wounds (aka, bath, pool, hot tub, etc.) for 2 weeks or until wounds have healed   - Remove wound dressing 48 hours after surgery if already not removed.   - If purple dermabond glue was used, avoid applying any lotions or ointments.   - Leave incision open to air. Cover with gauze only if needed for comfort or to protect clothing from drainage.    Discharge Medications/instructions:   1) PAIN: Oxycodone is a narcotic medication that has been prescribed for pain. Narcotics will cause sleepiness and constipation, therefore it is best to stop or reduce them as soon as you can and switch to using acetaminophen (Tylenol) and/or ibuprofen (Advil/Motrin), taken as directed on the packaging. Keep in mind that certain narcotics can contain acetaminophen, also called \"APAP\" on prescription bottles. Do not take more than 4,000mg of Tylenol (acetaminophen/ APAP) from all sources in any 24 hour period since this can cause liver damage. Never drive, operate machinery or drink alcoholic beverages while you are taking narcotic pain medications.     2) CONSTIPATION: Pericolace (senna/docusate sodium) can be taken twice daily for prevention of constipation since surgery, pain medications and bladder spasm medications can all make you constipated. Please reduce or stop pericolace if you develop loose stools. Other over the counter solutions such as prune juice, miralax, fiber products, senna, and dulcolax can also be used. If you are taking the pericolace but still have not had a bowel movement in 3 days, start over-the-counter Milk of Magnesia taken twice daily until you have a nice bowel movement. Call the office with any concerns.     3) tamsulosin 0.4mg (Flomax) to be taken daily until " ureteral stents removed    4) Ditropan: Take daily to decrease ureteral stent discomfort. Stop taking one day before your urology clinic visit      Questions/concerns------------------------  [facility]: [phone]    Future appointments  You will be contacted to arrange follow up with Dr. Hanson in 4 weeks for ureteral stent removal.    Brendan Hanson MD          Today you were given 975 mg of Tylenol at 6:25 am. The recommended daily maximum dose is 4000 mg.       Same Day Surgery Discharge Instructions for  Sedation and General Anesthesia       It's not unusual to feel dizzy, light-headed or faint for up to 24 hours after surgery or while taking pain medication.  If you have these symptoms: sit for a few minutes before standing and have someone assist you when you get up to walk or use the bathroom.      You should rest and relax for the next 24 hours. We recommend you make arrangements to have an adult stay with you for at least 24 hours after your discharge.  Avoid hazardous and strenuous activity.      DO NOT DRIVE any vehicle or operate mechanical equipment for 24 hours following the end of your surgery.  Even though you may feel normal, your reactions may be affected by the medication you have received.      Do not drink alcoholic beverages for 24 hours following surgery.       Slowly progress to your regular diet as you feel able. It's not unusual to feel nauseated and/or vomit after receiving anesthesia.  If you develop these symptoms, drink clear liquids (apple juice, ginger ale, broth, 7-up, etc. ) until you feel better.  If your nausea and vomiting persists for 24 hours, please notify your surgeon.        All narcotic pain medications, along with inactivity and anesthesia, can cause constipation. Drinking plenty of liquids and increasing fiber intake will help.      For any questions of a medical nature, call your surgeon.      Do not make important decisions for 24 hours.      If you  had general anesthesia, you may have a sore throat for a couple of days related to the breathing tube used during surgery.  You may use Cepacol lozenges to help with this discomfort.  If it worsens or if you develop a fever, contact your surgeon.       If you feel your pain is not well managed with the pain medications prescribed by your surgeon, please contact your surgeon's office to let them know so they can address your concerns.       CoVid 19 Information    We want to give you information regarding Covid. Please consult your primary care provider with any questions you might have.     Patient who have symptoms (cough, fever, or shortness of breath), need to isolate for 7 days from when symptoms started OR 72 hours after fever resolves (without fever reducing medications) AND improvement of respiratory symptoms (whichever is longer).      Isolate yourself at home (in own room/own bathroom if possible)    Do Not allow any visitors    Do Not go to work or school    Do Not go to Orthodox,  centers, shopping, or other public places.    Do Not shake hands.    Avoid close and intimate contact with others (hugging, kissing).    Follow CDC recommendations for household cleaning of frequently touched services.     After the initial 7 days, continue to isolate yourself from household members as much as possible. To continue decrease the risk of community spread and exposure, you and any members of your household should limit activities in public for 14 days after starting home isolation.     You can reference the following CDC link for helpful home isolation/care tips:  https://www.cdc.gov/coronavirus/2019-ncov/downloads/10Things.pdf    Protect Others:    Cover Your Mouth and Nose with a mask, disposable tissue or wash cloth to avoid spreading germs to others.    Wash your hands and face frequently with soap and water    Call Your Primary Doctor If: Breathing difficulty develops or you become worse.    For more  information about COVID19 and options for caring for yourself at home, please visit the CDC website at https://www.cdc.gov/coronavirus/2019-ncov/about/steps-when-sick.html  For more options for care at [facility], please visit our website at [email or web address]        **If you have questions or concerns about your procedure,   call Dr. Hanson at [phone]**

## 2021-04-12 NOTE — ANESTHESIA CARE TRANSFER NOTE
Patient: Paulette Singer    Procedure(s):  ROBOTIC ASSISTED LEFT URETEROURETEROSTOMY, CYSTOSCOPY, LEFT URETERAL STENT PLACEMENT    Diagnosis: Left flank pain [R10.9]  Duplicated left renal collecting system [Q62.5]  Hydronephrosis of left kidney [N13.30]  Diagnosis Additional Information: No value filed.    Anesthesia Type:   General     Note:    Oropharynx: endotracheal tube in place  Level of Consciousness: unresponsive        Dentition: dentition unchanged  Vital Signs Stable: post-procedure vital signs reviewed and stable  Report to RN Given: handoff report given  Patient transferred to: PACU  Comments: Neuromuscular blockade reversed after TOF 4/4, Patient not meeting criteria for extubation, to PACU on T-piece and placed on vent by RT. Oxygen via ETT at 10lpm  to PACU. Oxygen tubing connected to wall O2 in PACU, SpO2, NiBP, and EKG monitors and alarms on and functioning, Len Hugger warmer connected to patient gown, report on patient's clinical status given to PACU RN, RN questions answered.     Handoff Report: Identifed the Patient, Identified the Reponsible Provider, Reviewed the pertinent medical history, Discussed the surgical course, Reviewed Intra-OP anesthesia mangement and issues during anesthesia, Set expectations for post-procedure period and Allowed opportunity for questions and acknowledgement of understanding      Vitals: (Last set prior to Anesthesia Care Transfer)  CRNA VITALS  4/12/2021 1113 - 4/12/2021 1152      4/12/2021             NIBP:  101/71    Pulse:  (!) 47    NIBP Mean:  83    SpO2:  100 %    Resp Rate (observed):  16    Resp Rate (set):  10        Electronically Signed By: JAMEY Crzu CRNA  April 12, 2021  11:52 AM

## 2021-04-12 NOTE — PLAN OF CARE
Arrived from PACU at 1445.  FINA drain with sanguinous drainage. Dermabond on 3 lap sites on abdomen.  Shaffer with dark urine due to medications.  CO bladder spasm.  IV fluids initiated.  Capno in place.  VSS but desaturation of oxygen with pt fell asleep.  Tolerating ice chips.  Scopolamine patch behind left ear.  PCD on.

## 2021-04-12 NOTE — PROGRESS NOTES
Patient opening eyes, following commands.  Ok to extubate per Dr. Diane.  RT at bedside and removed ET tube

## 2021-04-12 NOTE — ANESTHESIA PROCEDURE NOTES
Airway       Patient location during procedure: OR       Procedure Start/Stop Times: 4/12/2021 8:29 AM  Staff -        Anesthesiologist:  Steve Diane DO       CRNA: Rukhsana Gottlieb APRN CRNA       Other Anesthesia Staff: Toby Adrian       Performed By: SRNA  Consent for Airway        Urgency: elective  Indications and Patient Condition       Indications for airway management: derrek-procedural       Induction type:intravenous       Mask difficulty assessment: 1 - vent by mask    Final Airway Details       Final airway type: endotracheal airway       Successful airway: ETT - single  Endotracheal Airway Details        ETT size (mm): 7.0       Cuffed: yes       Successful intubation technique: direct laryngoscopy       DL Blade Type: MAC 3       Grade View of Cords: 1       Adjucts: stylet       Position: Right       Measured from: lips       Secured at (cm): 21       Bite block used: None    Post intubation assessment        Placement verified by: capnometry, equal breath sounds and chest rise        Number of attempts at approach: 1       Number of other approaches attempted: 0       Secured with: pink tape       Ease of procedure: easy       Dentition: Intact    Medication(s) Administered   Medication Administration Time: 4/12/2021 7:55 AM

## 2021-04-12 NOTE — BRIEF OP NOTE
[facility]    Brief Operative Note    Pre-operative diagnosis: Left flank pain [R10.9]  Duplicated left renal collecting system [Q62.5]  Hydronephrosis of left kidney [N13.30]  Post-operative diagnosis Same as pre-operative diagnosis    Procedure: Procedure(s):  ROBOTIC ASSISTED LEFT URETEROURETEROSTOMY, CYSTOSCOPY, LEFT URETERAL STENT PLACEMENT  Surgeon: Surgeon(s) and Role:     * Brendan Hanson MD - Primary  Anesthesia: General   Estimated blood loss: Less than 50 ml  Drains: 19-Fr harinder drain, 16-Fr berman catheter, 5 Fr x 26 cm JJ stent x 2 on the left   Specimens: * No specimens in log *  Findings:   See full operative report.  Complications: None.  Implants:   Implant Name Type Inv. Item Serial No.  Lot No. LRB No. Used Action   STENT URETERAL POLARIS ULTRA [id number] Stent STENT URETERAL POLARIS ULTRA [id number]  No [employer] [id number] Left 2 Implanted

## 2021-04-13 VITALS
HEIGHT: 67 IN | DIASTOLIC BLOOD PRESSURE: 64 MMHG | RESPIRATION RATE: 16 BRPM | SYSTOLIC BLOOD PRESSURE: 122 MMHG | TEMPERATURE: 97.5 F | BODY MASS INDEX: 25.13 KG/M2 | WEIGHT: 160.1 LBS | OXYGEN SATURATION: 96 % | HEART RATE: 71 BPM

## 2021-04-13 LAB
ANION GAP SERPL CALCULATED.3IONS-SCNC: 4 MMOL/L (ref 3–14)
BUN SERPL-MCNC: 12 MG/DL (ref 7–30)
CALCIUM SERPL-MCNC: 7.9 MG/DL (ref 8.5–10.1)
CHLORIDE SERPL-SCNC: 110 MMOL/L (ref 94–109)
CO2 SERPL-SCNC: 25 MMOL/L (ref 20–32)
CREAT SERPL-MCNC: 0.84 MG/DL (ref 0.52–1.04)
ERYTHROCYTE [DISTWIDTH] IN BLOOD BY AUTOMATED COUNT: 11.8 % (ref 10–15)
GFR SERPL CREATININE-BSD FRML MDRD: >90 ML/MIN/{1.73_M2}
GLUCOSE SERPL-MCNC: 96 MG/DL (ref 70–99)
HCT VFR BLD AUTO: 41.4 % (ref 35–47)
HGB BLD-MCNC: 13.7 G/DL (ref 11.7–15.7)
MCH RBC QN AUTO: 29.7 PG (ref 26.5–33)
MCHC RBC AUTO-ENTMCNC: 33.1 G/DL (ref 31.5–36.5)
MCV RBC AUTO: 90 FL (ref 78–100)
PLATELET # BLD AUTO: 189 10E9/L (ref 150–450)
POTASSIUM SERPL-SCNC: 3.8 MMOL/L (ref 3.4–5.3)
RBC # BLD AUTO: 4.62 10E12/L (ref 3.8–5.2)
SODIUM SERPL-SCNC: 139 MMOL/L (ref 133–144)
WBC # BLD AUTO: 11.8 10E9/L (ref 4–11)

## 2021-04-13 PROCEDURE — 250N000013 HC RX MED GY IP 250 OP 250 PS 637: Performed by: STUDENT IN AN ORGANIZED HEALTH CARE EDUCATION/TRAINING PROGRAM

## 2021-04-13 PROCEDURE — 96372 THER/PROPH/DIAG INJ SC/IM: CPT | Performed by: STUDENT IN AN ORGANIZED HEALTH CARE EDUCATION/TRAINING PROGRAM

## 2021-04-13 PROCEDURE — 80048 BASIC METABOLIC PNL TOTAL CA: CPT | Performed by: STUDENT IN AN ORGANIZED HEALTH CARE EDUCATION/TRAINING PROGRAM

## 2021-04-13 PROCEDURE — 36415 COLL VENOUS BLD VENIPUNCTURE: CPT | Performed by: STUDENT IN AN ORGANIZED HEALTH CARE EDUCATION/TRAINING PROGRAM

## 2021-04-13 PROCEDURE — 258N000003 HC RX IP 258 OP 636: Performed by: STUDENT IN AN ORGANIZED HEALTH CARE EDUCATION/TRAINING PROGRAM

## 2021-04-13 PROCEDURE — 250N000011 HC RX IP 250 OP 636: Performed by: STUDENT IN AN ORGANIZED HEALTH CARE EDUCATION/TRAINING PROGRAM

## 2021-04-13 PROCEDURE — 85027 COMPLETE CBC AUTOMATED: CPT | Performed by: STUDENT IN AN ORGANIZED HEALTH CARE EDUCATION/TRAINING PROGRAM

## 2021-04-13 RX ADMIN — HEPARIN SODIUM 5000 UNITS: 5000 INJECTION INTRAVENOUS; SUBCUTANEOUS at 09:35

## 2021-04-13 RX ADMIN — ACETAMINOPHEN 975 MG: 325 TABLET, FILM COATED ORAL at 09:55

## 2021-04-13 RX ADMIN — KETOROLAC TROMETHAMINE 15 MG: 15 INJECTION, SOLUTION INTRAMUSCULAR; INTRAVENOUS at 04:11

## 2021-04-13 RX ADMIN — OXYBUTYNIN CHLORIDE 5 MG: 5 TABLET ORAL at 04:14

## 2021-04-13 RX ADMIN — OXYBUTYNIN CHLORIDE 5 MG: 5 TABLET ORAL at 09:54

## 2021-04-13 RX ADMIN — ACETAMINOPHEN 975 MG: 325 TABLET, FILM COATED ORAL at 04:10

## 2021-04-13 RX ADMIN — POLYETHYLENE GLYCOL 3350 17 G: 17 POWDER, FOR SOLUTION ORAL at 09:33

## 2021-04-13 RX ADMIN — KETOROLAC TROMETHAMINE 15 MG: 15 INJECTION, SOLUTION INTRAMUSCULAR; INTRAVENOUS at 09:55

## 2021-04-13 RX ADMIN — SODIUM CHLORIDE: 9 INJECTION, SOLUTION INTRAVENOUS at 03:52

## 2021-04-13 NOTE — PROGRESS NOTES
"Urology  Progress Note    No acute issues overnight  Pain controlled  Ambulating   Tolerating FLD   No flatus, BM     Exam  /64 (BP Location: Right arm)   Pulse 71   Temp 97.5  F (36.4  C) (Oral)   Resp 16   Ht 1.702 m (5' 7\")   Wt 72.6 kg (160 lb 1.6 oz)   LMP 07/30/2018   SpO2 96%   Breastfeeding No   BMI 25.08 kg/m    No acute distress  Unlabored breathing  Abdomen soft, nt/nd, incisions CDI with dermabond  FINA serosanguinous  Berman julieta in appearance    UOP 1975  FINA 15    Labs  WBC 11.8  Hgb 13.7  Cr 0.84    Assessment/Plan  35 year old y/o female POD#1 s/p robot assisted laparoscopic left ureteroureterostomy for left duplicated system with hydronephrosis of the upper pole moiety.     Neuro: APAP, Toradol, Oxycodone for pain control  CV: HDS  Pulm: incentive spirometry while awake  FEN/GI: Regular diet, SLIV  Endo: LESLEE  : Remove berman this AM  Heme/ID: [email or web address] abx completed  Activity: Up ad gian  PPx: SCDs, SQH    Discussed with Dr. Hanson.     Cal Waldrop MD, PGY-5  Urology Resident     Contacting the Urology Team     Please use the following job codes to reach the Urology Team. Note that you must use an in house phone and that job codes cannot receive text pages.     On weekdays, dial [phone] (or star-star-star [phone] on the new [employer] telephones) then [phone] to reach the Adult Urology resident or PA on call    On weekdays, dial [phone] (or star-star-star 777 on the new [employer] telephones) then [phone] to reach the Pediatric Urology resident    On weeknights and weekends, dial [phone] (or star-star-star 777 on the new [employer] telephones) then [phone] to reach the Urology resident on call (for both Adult and Pediatrics)          "

## 2021-04-13 NOTE — PROGRESS NOTES
MD Notification    Notified Person: MD    Notified Person Name: left message for on-call MD     Notification Date/Time: 4/12/21 @2014    Notification Interaction: called urology clinic phone number     Purpose of Notification: Patient is having bladder spasms. Could you please place an order for B&O suppositories & oxybutynin medications? Thanks.     Orders Received: MD placed orders

## 2021-04-13 NOTE — PLAN OF CARE
A&Ox4. VSS on RA. Refused capno overnight. Denies pain and nausea overnight. Schedule Toradol & tylenol given. PRN oxybutynin given x1.  Scopolamine patch removed this am. BS audible. Shaffer in place w/ light pink/yellow output. Up A1 & gait belt. 3 abdominal lap sites GLENDA/CDI. FINA to L abdomen w/ minimal sanguinous output. PIV w/ IVF @ 75mL/hr. Pt rested comfortably overnight. Ambulated this am, pt tolerated well. Discharge pending.

## 2021-04-13 NOTE — PLAN OF CARE
A&Ox4. VSS on RA, capno on. C/o bladder spasms, controlled with scheduled toradol + tylenol, & PRN B&O suppository. Tolerates fulls diet,  brought soup for patient for dinner, denies nausea. Scopolamine patch behind L ear. Hypoactive BS. Shaffer patent with yellow/pink UOP. No BM this shift. Up A1+gb, ambulated in hallway a short distance & turned around when pt felt dizzy. 3 lap sites to L abd GLENDA & CDI.  site CDI with sanguinous output. Educated on IS, uses independently. Abd xray completed, see results. PIV infusing NS @75 ml/hr. Possible discharge tomorrow.

## 2021-04-20 ENCOUNTER — TELEPHONE (OUTPATIENT)
Dept: UROLOGY | Facility: CLINIC | Age: 36
End: 2021-04-20

## 2021-04-20 DIAGNOSIS — Z98.890 POSTOPERATIVE STATE: ICD-10-CM

## 2021-04-20 DIAGNOSIS — R10.9 LEFT FLANK PAIN: ICD-10-CM

## 2021-04-20 DIAGNOSIS — Q62.5 DUPLICATED LEFT RENAL COLLECTING SYSTEM: Primary | ICD-10-CM

## 2021-04-20 NOTE — TELEPHONE ENCOUNTER
"Returned call to Paulette. She states she \"doesn't think she can handle\" getting her stent removed in the clinic setting. She would like to speak with Dr. Hanson re: options for this.  Also c/o spasms and pain. Reports she's had diarrhea since surgery. She's taking her oxybutynin and Flomax as prescribed. Has stopped the Colace. Rates pain at 3-6/10; worse with movement. No temp. Anxious.  Update sent to provider. Will await response.  REBECA Venegas RN    [facility] Call Center    Phone Message    May a detailed message be left on voicemail: yes     Reason for Call: Other: Paulette calling to report that she does not believe that the inclinic stent removal is going to work. She is wanting a call back to discuss other options. Please give her a call when possible. Thanks!     Action Taken: Message routed to:  Other: [facility] Uro    Travel Screening: Not Applicable                                                                      "

## 2021-04-23 NOTE — TELEPHONE ENCOUNTER
[facility] Call Center    Phone Message    May a detailed message be left on voicemail: yes     Reason for Call: Other: PT Deirdre called back for Dr. Hanson + care team. Per PT she has not yet heard from Dr. Hanson following her previous message. Pls call her to discuss options.     Action Taken: Other: U[facility] URO    Travel Screening: Not Applicable

## 2021-04-25 ENCOUNTER — PREP FOR PROCEDURE (OUTPATIENT)
Dept: UROLOGY | Facility: CLINIC | Age: 36
End: 2021-04-25

## 2021-04-25 DIAGNOSIS — Q62.5 DUPLICATED LEFT RENAL COLLECTING SYSTEM: Primary | ICD-10-CM

## 2021-04-25 DIAGNOSIS — Z46.6 ENCOUNTER FOR REMOVAL OF URETERAL STENT: ICD-10-CM

## 2021-04-25 NOTE — TELEPHONE ENCOUNTER
Paulette Singer  [id number]  April 25, 2021  11:19 AM     I called and discussed with the patient.  She is having ongoing stent discomfort and some left-sided lower abdominal pain just lateral to her previous drain incision.  She notes some terminal dysuria and is having some cloudy urine.  She is also very anxious about the prospect of an office cystoscopy for stent removal and is fairly adamant that she would not tolerate this well.  For pain control we reviewed her current regimen, she has not tried the Toradol I recommend that she give this a try.  Given her dysuria, will place an order for a urinalysis and urine culture which she will come to the lab tomorrow to collect.  She notes the [facility] location is her closest [facility] option.  For the stent removal, we discussed the possibility of operative stent removal and I will place a case request to have this done on 4/10/2021.    Brendan Hanson M.D.

## 2021-04-26 DIAGNOSIS — Z11.59 ENCOUNTER FOR SCREENING FOR OTHER VIRAL DISEASES: ICD-10-CM

## 2021-04-26 PROBLEM — Z46.6 ENCOUNTER FOR REMOVAL OF URETERAL STENT: Status: ACTIVE | Noted: 2021-04-26

## 2021-04-29 ENCOUNTER — MYC MEDICAL ADVICE (OUTPATIENT)
Dept: UROLOGY | Facility: CLINIC | Age: 36
End: 2021-04-29

## 2021-04-30 DIAGNOSIS — R30.0 DYSURIA: Primary | ICD-10-CM

## 2021-05-04 ENCOUNTER — RECORDS - HEALTHEAST (OUTPATIENT)
Dept: ADMINISTRATIVE | Facility: OTHER | Age: 36
End: 2021-05-04

## 2021-05-07 ENCOUNTER — AMBULATORY - HEALTHEAST (OUTPATIENT)
Dept: LAB | Facility: CLINIC | Age: 36
End: 2021-05-07

## 2021-05-07 DIAGNOSIS — Z11.59 ENCOUNTER FOR SCREENING FOR OTHER VIRAL DISEASES: ICD-10-CM

## 2021-05-08 LAB
SARS-COV-2 PCR COMMENT: NORMAL
SARS-COV-2 RNA SPEC QL NAA+PROBE: NEGATIVE
SARS-COV-2 VIRUS SPECIMEN SOURCE: NORMAL

## 2021-05-09 ENCOUNTER — COMMUNICATION - HEALTHEAST (OUTPATIENT)
Dept: SCHEDULING | Facility: CLINIC | Age: 36
End: 2021-05-09

## 2021-05-10 ENCOUNTER — ANESTHESIA (OUTPATIENT)
Dept: SURGERY | Facility: CLINIC | Age: 36
End: 2021-05-10
Payer: COMMERCIAL

## 2021-05-10 ENCOUNTER — APPOINTMENT (OUTPATIENT)
Dept: GENERAL RADIOLOGY | Facility: CLINIC | Age: 36
End: 2021-05-10
Attending: UROLOGY
Payer: COMMERCIAL

## 2021-05-10 ENCOUNTER — HOSPITAL ENCOUNTER (OUTPATIENT)
Facility: CLINIC | Age: 36
Discharge: HOME | End: 2021-05-10
Attending: UROLOGY | Admitting: UROLOGY
Payer: COMMERCIAL

## 2021-05-10 ENCOUNTER — ANESTHESIA EVENT (OUTPATIENT)
Dept: SURGERY | Facility: CLINIC | Age: 36
End: 2021-05-10
Payer: COMMERCIAL

## 2021-05-10 VITALS
SYSTOLIC BLOOD PRESSURE: 107 MMHG | HEART RATE: 58 BPM | WEIGHT: 158.7 LBS | TEMPERATURE: 97 F | BODY MASS INDEX: 24.91 KG/M2 | OXYGEN SATURATION: 99 % | DIASTOLIC BLOOD PRESSURE: 69 MMHG | RESPIRATION RATE: 16 BRPM | HEIGHT: 67 IN

## 2021-05-10 DIAGNOSIS — Z46.6 ENCOUNTER FOR REMOVAL OF URETERAL STENT: ICD-10-CM

## 2021-05-10 DIAGNOSIS — Q62.5 DUPLICATED LEFT RENAL COLLECTING SYSTEM: ICD-10-CM

## 2021-05-10 PROCEDURE — 272N000001 HC OR GENERAL SUPPLY STERILE: Performed by: UROLOGY

## 2021-05-10 PROCEDURE — 710N000009 HC RECOVERY PHASE 1, LEVEL 1, PER MIN: Performed by: UROLOGY

## 2021-05-10 PROCEDURE — 258N000003 HC RX IP 258 OP 636: Performed by: NURSE ANESTHETIST, CERTIFIED REGISTERED

## 2021-05-10 PROCEDURE — 52310 CYSTOSCOPY AND TREATMENT: CPT | Mod: LT | Performed by: UROLOGY

## 2021-05-10 PROCEDURE — 250N000011 HC RX IP 250 OP 636: Performed by: UROLOGY

## 2021-05-10 PROCEDURE — 999N000179 XR SURGERY CARM FLUORO LESS THAN 5 MIN W STILLS

## 2021-05-10 PROCEDURE — 370N000017 HC ANESTHESIA TECHNICAL FEE, PER MIN: Performed by: UROLOGY

## 2021-05-10 PROCEDURE — 999N000141 HC STATISTIC PRE-PROCEDURE NURSING ASSESSMENT: Performed by: UROLOGY

## 2021-05-10 PROCEDURE — 250N000009 HC RX 250: Performed by: NURSE ANESTHETIST, CERTIFIED REGISTERED

## 2021-05-10 PROCEDURE — 74420 UROGRAPHY RTRGR +-KUB: CPT | Mod: 26 | Performed by: UROLOGY

## 2021-05-10 PROCEDURE — 360N000075 HC SURGERY LEVEL 2, PER MIN: Performed by: UROLOGY

## 2021-05-10 PROCEDURE — 710N000012 HC RECOVERY PHASE 2, PER MINUTE: Performed by: UROLOGY

## 2021-05-10 PROCEDURE — 250N000011 HC RX IP 250 OP 636: Performed by: NURSE ANESTHETIST, CERTIFIED REGISTERED

## 2021-05-10 PROCEDURE — C1769 GUIDE WIRE: HCPCS | Performed by: UROLOGY

## 2021-05-10 RX ORDER — PROPOFOL 10 MG/ML
INJECTION, EMULSION INTRAVENOUS CONTINUOUS PRN
Status: DISCONTINUED | OUTPATIENT
Start: 2021-05-10 | End: 2021-05-10

## 2021-05-10 RX ORDER — LABETALOL HYDROCHLORIDE 5 MG/ML
10 INJECTION, SOLUTION INTRAVENOUS
Status: DISCONTINUED | OUTPATIENT
Start: 2021-05-10 | End: 2021-05-10 | Stop reason: HOSPADM

## 2021-05-10 RX ORDER — LIDOCAINE HYDROCHLORIDE 20 MG/ML
INJECTION, SOLUTION INFILTRATION; PERINEURAL PRN
Status: DISCONTINUED | OUTPATIENT
Start: 2021-05-10 | End: 2021-05-10

## 2021-05-10 RX ORDER — PROPOFOL 10 MG/ML
INJECTION, EMULSION INTRAVENOUS PRN
Status: DISCONTINUED | OUTPATIENT
Start: 2021-05-10 | End: 2021-05-10

## 2021-05-10 RX ORDER — HYDROMORPHONE HYDROCHLORIDE 1 MG/ML
.3-.5 INJECTION, SOLUTION INTRAMUSCULAR; INTRAVENOUS; SUBCUTANEOUS EVERY 5 MIN PRN
Status: DISCONTINUED | OUTPATIENT
Start: 2021-05-10 | End: 2021-05-10 | Stop reason: HOSPADM

## 2021-05-10 RX ORDER — SODIUM CHLORIDE, SODIUM LACTATE, POTASSIUM CHLORIDE, CALCIUM CHLORIDE 600; 310; 30; 20 MG/100ML; MG/100ML; MG/100ML; MG/100ML
INJECTION, SOLUTION INTRAVENOUS CONTINUOUS PRN
Status: DISCONTINUED | OUTPATIENT
Start: 2021-05-10 | End: 2021-05-10

## 2021-05-10 RX ORDER — ONDANSETRON 2 MG/ML
4 INJECTION INTRAMUSCULAR; INTRAVENOUS EVERY 30 MIN PRN
Status: DISCONTINUED | OUTPATIENT
Start: 2021-05-10 | End: 2021-05-10 | Stop reason: HOSPADM

## 2021-05-10 RX ORDER — FENTANYL CITRATE 50 UG/ML
25-50 INJECTION, SOLUTION INTRAMUSCULAR; INTRAVENOUS
Status: DISCONTINUED | OUTPATIENT
Start: 2021-05-10 | End: 2021-05-10 | Stop reason: HOSPADM

## 2021-05-10 RX ORDER — FENTANYL CITRATE 50 UG/ML
INJECTION, SOLUTION INTRAMUSCULAR; INTRAVENOUS PRN
Status: DISCONTINUED | OUTPATIENT
Start: 2021-05-10 | End: 2021-05-10

## 2021-05-10 RX ORDER — NALOXONE HYDROCHLORIDE 0.4 MG/ML
0.2 INJECTION, SOLUTION INTRAMUSCULAR; INTRAVENOUS; SUBCUTANEOUS
Status: DISCONTINUED | OUTPATIENT
Start: 2021-05-10 | End: 2021-05-10 | Stop reason: HOSPADM

## 2021-05-10 RX ORDER — NALOXONE HYDROCHLORIDE 0.4 MG/ML
0.4 INJECTION, SOLUTION INTRAMUSCULAR; INTRAVENOUS; SUBCUTANEOUS
Status: DISCONTINUED | OUTPATIENT
Start: 2021-05-10 | End: 2021-05-10 | Stop reason: HOSPADM

## 2021-05-10 RX ORDER — KETOROLAC TROMETHAMINE 30 MG/ML
INJECTION, SOLUTION INTRAMUSCULAR; INTRAVENOUS PRN
Status: DISCONTINUED | OUTPATIENT
Start: 2021-05-10 | End: 2021-05-10

## 2021-05-10 RX ORDER — CEFAZOLIN SODIUM 2 G/100ML
2 INJECTION, SOLUTION INTRAVENOUS
Status: DISCONTINUED | OUTPATIENT
Start: 2021-05-10 | End: 2021-05-10 | Stop reason: HOSPADM

## 2021-05-10 RX ORDER — SODIUM CHLORIDE, SODIUM LACTATE, POTASSIUM CHLORIDE, CALCIUM CHLORIDE 600; 310; 30; 20 MG/100ML; MG/100ML; MG/100ML; MG/100ML
INJECTION, SOLUTION INTRAVENOUS CONTINUOUS
Status: DISCONTINUED | OUTPATIENT
Start: 2021-05-10 | End: 2021-05-10 | Stop reason: HOSPADM

## 2021-05-10 RX ORDER — ONDANSETRON 4 MG/1
4 TABLET, ORALLY DISINTEGRATING ORAL EVERY 30 MIN PRN
Status: DISCONTINUED | OUTPATIENT
Start: 2021-05-10 | End: 2021-05-10 | Stop reason: HOSPADM

## 2021-05-10 RX ORDER — ONDANSETRON 2 MG/ML
INJECTION INTRAMUSCULAR; INTRAVENOUS PRN
Status: DISCONTINUED | OUTPATIENT
Start: 2021-05-10 | End: 2021-05-10

## 2021-05-10 RX ORDER — CEFAZOLIN SODIUM 2 G/100ML
2 INJECTION, SOLUTION INTRAVENOUS SEE ADMIN INSTRUCTIONS
Status: DISCONTINUED | OUTPATIENT
Start: 2021-05-10 | End: 2021-05-10 | Stop reason: HOSPADM

## 2021-05-10 RX ADMIN — KETOROLAC TROMETHAMINE 15 MG: 30 INJECTION, SOLUTION INTRAMUSCULAR at 15:05

## 2021-05-10 RX ADMIN — ONDANSETRON 4 MG: 2 INJECTION INTRAMUSCULAR; INTRAVENOUS at 15:05

## 2021-05-10 RX ADMIN — SODIUM CHLORIDE, POTASSIUM CHLORIDE, SODIUM LACTATE AND CALCIUM CHLORIDE: 600; 310; 30; 20 INJECTION, SOLUTION INTRAVENOUS at 14:39

## 2021-05-10 RX ADMIN — MIDAZOLAM 2 MG: 1 INJECTION INTRAMUSCULAR; INTRAVENOUS at 14:41

## 2021-05-10 RX ADMIN — PROPOFOL 30 MG: 10 INJECTION, EMULSION INTRAVENOUS at 14:41

## 2021-05-10 RX ADMIN — CEFAZOLIN SODIUM 2 G: 2 INJECTION, SOLUTION INTRAVENOUS at 14:52

## 2021-05-10 RX ADMIN — LIDOCAINE HYDROCHLORIDE 40 MG: 20 INJECTION, SOLUTION INFILTRATION; PERINEURAL at 14:41

## 2021-05-10 RX ADMIN — FENTANYL CITRATE 50 MCG: 50 INJECTION, SOLUTION INTRAMUSCULAR; INTRAVENOUS at 14:41

## 2021-05-10 RX ADMIN — DEXMEDETOMIDINE HYDROCHLORIDE 12 MCG: 100 INJECTION, SOLUTION INTRAVENOUS at 14:41

## 2021-05-10 RX ADMIN — PROPOFOL 150 MCG/KG/MIN: 10 INJECTION, EMULSION INTRAVENOUS at 14:41

## 2021-05-10 ASSESSMENT — LIFESTYLE VARIABLES: TOBACCO_USE: 0

## 2021-05-10 ASSESSMENT — MIFFLIN-ST. JEOR: SCORE: 1457.49

## 2021-05-10 NOTE — DISCHARGE INSTRUCTIONS
POSTOPERATIVE INSTRUCTIONS    Diagnosis-------------------------------   LEFT ureteral stent removal    Procedure-------------------------------  Procedure(s) (LRB):  CYSTOSCOPY, WITH LEFT RETROGRADE PYELOGRAM AND URETERAL STENT REMOVAL (Left)      Findings--------------------------------  LEFT ureteral stents removed. Retrograde Pyelogram with good filling of both the upper and lower pole moieties and no evidence of extravasation of contrast.     Home-going instructions-----------------           FOLLOW THESE INSTRUCTIONS AS INDICATED BELOW:  - You may shower.  - Increase fluid intake to promote clear urine.  - Resume usual diet as tolerated    What to expect while recovering-----------  - You may experience some intermittent bleeding that makes your urine pink or cherry colored. This is normal.  - However, if you are unable to urinate, passing large amount of clots, have paty blood in your urine, or have a temperature >101 degrees, call the urology nurse on call, or present to your nearest emergency department.  - You are encouraged to walk daily, and have no activity restrictions.     Discharge Medications/instructions:     - Take Tylenol 1000mg every 6 hours for pain    - Take Ibuprofen 600mg every 6 hours as needed for additional pain control      Questions/concerns------------------------  [facility]: [phone]  [facility]: [phone]    Future appointments  You will be contacted to arrange follow up with Dr. Hanson in ~4 weeks.    Brendan Hanson MD     Same Day Surgery Discharge Instructions for  Sedation and General Anesthesia       It's not unusual to feel dizzy, light-headed or faint for up to 24 hours after surgery or while taking pain medication.  If you have these symptoms: sit for a few minutes before standing and have someone assist you when you get up to walk or use the bathroom.      You should rest and relax for the next 24 hours. We recommend you make  arrangements to have an adult stay with you for at least 24 hours after your discharge.  Avoid hazardous and strenuous activity.      DO NOT DRIVE any vehicle or operate mechanical equipment for 24 hours following the end of your surgery.  Even though you may feel normal, your reactions may be affected by the medication you have received.      Do not drink alcoholic beverages for 24 hours following surgery.       Slowly progress to your regular diet as you feel able. It's not unusual to feel nauseated and/or vomit after receiving anesthesia.  If you develop these symptoms, drink clear liquids (apple juice, ginger ale, broth, 7-up, etc. ) until you feel better.  If your nausea and vomiting persists for 24 hours, please notify your surgeon.        All narcotic pain medications, along with inactivity and anesthesia, can cause constipation. Drinking plenty of liquids and increasing fiber intake will help.      For any questions of a medical nature, call your surgeon.      Do not make important decisions for 24 hours.      If you had general anesthesia, you may have a sore throat for a couple of days related to the breathing tube used during surgery.  You may use Cepacol lozenges to help with this discomfort.  If it worsens or if you develop a fever, contact your surgeon.       If you feel your pain is not well managed with the pain medications prescribed by your surgeon, please contact your surgeon's office to let them know so they can address your concerns.       CoVid 19 Information    We want to give you information regarding Covid. Please consult your primary care provider with any questions you might have.     Patient who have symptoms (cough, fever, or shortness of breath), need to isolate for 7 days from when symptoms started OR 72 hours after fever resolves (without fever reducing medications) AND improvement of respiratory symptoms (whichever is longer).      Isolate yourself at home (in own room/own bathroom  if possible)    Do Not allow any visitors    Do Not go to work or school    Do Not go to [employer],  centers, shopping, or other public places.    Do Not shake hands.    Avoid close and intimate contact with others (hugging, kissing).    Follow CDC recommendations for household cleaning of frequently touched services.     After the initial 7 days, continue to isolate yourself from household members as much as possible. To continue decrease the risk of community spread and exposure, you and any members of your household should limit activities in public for 14 days after starting home isolation.     You can reference the following CDC link for helpful home isolation/care tips:  https://www.cdc.gov/coronavirus/2019-ncov/downloads/10Things.pdf    Protect Others:    Cover Your Mouth and Nose with a mask, disposable tissue or wash cloth to avoid spreading germs to others.    Wash your hands and face frequently with soap and water    Call Your Primary Doctor If: Breathing difficulty develops or you become worse.    For more information about COVID19 and options for caring for yourself at home, please visit the CDC website at https://www.cdc.gov/coronavirus/2019-ncov/about/steps-when-sick.html  For more options for care at [facility], please visit our website at [email or web address]

## 2021-05-10 NOTE — OR NURSING
Discharge instructions by phone per Hospital COVID Protocol w Mother In Law Isabelle- pt and Mother in Law verbalize understanding, denies questions- waiting for ride for discharge to home.

## 2021-05-10 NOTE — OP NOTE
OPERATIVE REPORT  DATE OF SURGERY: 05/10/21  LOCATION OF SURGERY: 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 Cystoscopy  2. LEFT ureteral stent removal x 2  3. LEFT Retrograde Pyelogram      STAFF SURGEON: Brendan Hanson MD  ANESTHESIA: MAC.   ESTIMATED BLOOD LOSS: 0 mL.   DRAINS AND TUBES: None  COMPLICATIONS: None.   DISPOSITION: PACU.   SPECIMENS OBTAINED: None  SIGNIFICANT FINDINGS: LEFT ureteral stents removed. Retrograde Pyelogram with good filling of both the upper and lower pole moieties and no evidence of extravasation of contrast.      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  She is status post a robotic ureteroureterostomy on 4/12/2021.  During this surgery she had placement of a 5 [place] citizen by 26 cm ureteral stent into each of the upper and lower pole moieties.  She presents today for stent removal and retrograde pyelogram.    OPERATION PERFORMED:   Informed consent was obtained and the patient was brought to the operating room where general anesthesia was induced. The patient was given appropriate preoperative antibiotics and positioned supine. The patient was then repositioned in dorsal lithotomy with all pressure points padded. We then performed a timeout, verifying the correct patient's site and procedure to be performed.    22 [place] citizen cystoscope was inserted atraumatically into the bladder.  2 stents were noted emanating from the left ureteral orifice.  The stents were grasped and withdrawn to  the urethral meatus.  The stents were removed 1 at a time over a 0.035 sensor wire through ensure minimal friction of the anastomosis.  Following removal of the second wire a 5 Yi open-ended catheter was advanced over the wire to the proximal ureter just distal to the anastomosis.  The wire was removed.  A gentle retrograde pyelogram was performed with good opacification of both the upper and lower pole moieties and no evidence of extravasation of contrast indicating a well-healed repair.  The open-ended catheter was removed.  The bladder was drained.  She received 15 mg of IV Toradol.  She was emerged from anesthesia and taken to the PACU in stable condition.    Brendan Hanson MD   Urology  [facility]  Clinic Phone [phone]

## 2021-05-10 NOTE — ANESTHESIA POSTPROCEDURE EVALUATION
Patient: Paulette Singer    Procedure(s):  CYSTOSCOPY, WITH LEFT RETROGRADE PYELOGRAM AND URETERAL STENT REMOVAL    Diagnosis:Duplicated left renal collecting system [Q62.5]  Encounter for removal of ureteral stent [Z46.6]  Diagnosis Additional Information: No value filed.    Anesthesia Type:  MAC    Note:  Disposition: Outpatient   Postop Pain Control: Uneventful            Sign Out: Well controlled pain   PONV: No   Neuro/Psych: Uneventful            Sign Out: Acceptable/Baseline neuro status   Airway/Respiratory: Uneventful            Sign Out: Acceptable/Baseline resp. status   CV/Hemodynamics: Uneventful            Sign Out: Acceptable CV status   Other NRE: NONE   DID A NON-ROUTINE EVENT OCCUR? No           Last vitals:  Vitals:    05/10/21 1230 05/10/21 1513 05/10/21 1515   BP: 129/88 108/55 106/66   Pulse:  73 67   Resp: 16 16 16   Temp: 37.1  C (98.7  F) 36.1  C (96.9  F)    SpO2: 99% 100% 98%       Last vitals prior to Anesthesia Care Transfer:  CRNA VITALS  5/10/2021 1440 - 5/10/2021 1540      5/10/2021             Resp Rate (set):  10          Electronically Signed By: Ed Brooke MD  May 10, 2021  3:50 PM

## 2021-05-10 NOTE — ANESTHESIA CARE TRANSFER NOTE
Patient: Paulette Singer    Procedure(s):  CYSTOSCOPY, WITH LEFT RETROGRADE PYELOGRAM AND URETERAL STENT REMOVAL    Diagnosis: Duplicated left renal collecting system [Q62.5]  Encounter for removal of ureteral stent [Z46.6]  Diagnosis Additional Information: No value filed.    Anesthesia Type:   MAC     Note:    Oropharynx: oropharynx clear of all foreign objects and spontaneously breathing  Level of Consciousness: awake  Oxygen Supplementation: face mask  Level of Supplemental Oxygen (L/min / FiO2): 6  Independent Airway: airway patency satisfactory and stable  Dentition: dentition unchanged  Vital Signs Stable: post-procedure vital signs reviewed and stable  Report to RN Given: handoff report given  Patient transferred to: PACU  Comments: At end of procedure, spontaneous respirations, patient alert to voice, able to follow commands. Patient breathing room air at room air to PACU. SpO2, NiBP, and EKG monitors and alarms on and functioning, Len Hugger warmer connected to patient gown, report on patient's clinical status given to PACU RN, RN questions answered.  Handoff Report: Identifed the Patient, Identified the Reponsible Provider, Reviewed the pertinent medical history, Discussed the surgical course, Reviewed Intra-OP anesthesia mangement and issues during anesthesia, Set expectations for post-procedure period and Allowed opportunity for questions and acknowledgement of understanding      Vitals: (Last set prior to Anesthesia Care Transfer)  CRNA VITALS  5/10/2021 1440 - 5/10/2021 1516      5/10/2021             Resp Rate (set):  10        Electronically Signed By: JAMEY Roper CRNA  May 10, 2021  3:16 PM

## 2021-05-10 NOTE — ANESTHESIA PREPROCEDURE EVALUATION
Anesthesia Pre-Procedure Evaluation    Patient: Paulette Singer   MRN: [id number] : 1985        Preoperative Diagnosis: Duplicated left renal collecting system [Q62.5]  Encounter for removal of ureteral stent [Z46.6]   Procedure : Procedure(s):  CYSTOSCOPY, WITH LEFT RETROGRADE PYELOGRAM AND URETERAL STENT REPLACEMENT     Past Medical History:   Diagnosis Date     Abnormal CAT scan 2017    bilateral duplicate renal     Cystocele, midline 2018     Headache 2007     Hernia, abdominal      Low ferritin 2016    3rd pregnancy     Migraines age 17    migraine/sinus headaches, better with excedrin-as child     Mixed stress and urge urinary incontinence 2018     Palpitations      Plantar fasciitis 2009    See podiatry consult     PONV (postoperative nausea and vomiting)      Uterine prolapse 2018      Past Surgical History:   Procedure Laterality Date     CYSTOSCOPY N/A 2018    Procedure: CYSTOSCOPY;;  Surgeon: Ed Noriega MD;  Location:  OR     CYSTOSCOPY, INJECT VESICOURETERAL REFLUX GEL N/A 2017    Procedure: CYSTOSCOPY, INJECT VESICOURETERAL REFLUX GEL;  CYSTOSCOPY WITH SUBMUCOSAL INJECTION OF DEFLUX ;  Surgeon: Jeferson White MD;  Location:  OR     CYSTOSCOPY, RETROGRADES, COMBINED Bilateral 2017    Procedure: COMBINED CYSTOSCOPY, RETROGRADES;  CYSTOSCOPY, LEFT URETEROSCOPY, LEFT STENT PLACEMENT & RETRIEVAL, BILATERAL RETROGRADES;  Surgeon: Jeferson White MD;  Location:  OR     CYSTOSCOPY, URETEROSCOPY, COMBINED Left 2017    Procedure: COMBINED CYSTOSCOPY, URETEROSCOPY;;  Surgeon: Jeferson White MD;  Location:  OR     DAVINCI PELVIC PROCEDURE Left 2021    Procedure: ROBOTIC ASSISTED LEFT URETEROURETEROSTOMY, CYSTOSCOPY, LEFT URETERAL STENT PLACEMENT;  Surgeon: Brendan Hanson MD;  Location:  OR     HERNIA REPAIR, INGUINAL RT/LT      bilateral as a baby     HYSTERECTOMY VAGINAL N/A 2018    Procedure: HYSTERECTOMY  VAGINAL;  Total Vaginal Hysterectomy, Bilateral salpingectomy;  Surgeon: Ed Noriega MD;  Location: [facility] OR     MOUTH SURGERY      Magazine teeth     PELVIS LAPAROSCOPY,DX  6/2009    normal     ZZHC UGI ENDOSCOPY, SIMPLE EXAM  09/11/2007      Allergies   Allergen Reactions     Amoxicillin Rash     Penicillins Rash      Social History     Tobacco Use     Smoking status: Never Smoker     Smokeless tobacco: Never Used   Substance Use Topics     Alcohol use: Yes     Alcohol/week: 0.0 standard drinks     Comment: glass a week      Wt Readings from Last 1 Encounters:   04/12/21 72.6 kg (160 lb 1.6 oz)        Anesthesia Evaluation   Pt has had prior anesthetic. Type: General.    History of anesthetic complications  - PONV.      ROS/MED HX  ENT/Pulmonary:    (-) tobacco use and sleep apnea   Neurologic:     (+) migraines,     Cardiovascular:       METS/Exercise Tolerance:     Hematologic:       Musculoskeletal:       GI/Hepatic:     (+) hiatal hernia,     Renal/Genitourinary: Comment: Left Double Ureter      Endo:       Psychiatric/Substance Use:       Infectious Disease:       Malignancy:       Other:            Physical Exam    Airway        Mallampati: I   TM distance: > 3 FB   Neck ROM: full   Mouth opening: > 3 cm    Respiratory Devices and Support         Dental  no notable dental history         Cardiovascular   cardiovascular exam normal          Pulmonary   pulmonary exam normal                OUTSIDE LABS:  CBC:   Lab Results   Component Value Date    WBC 11.8 (H) 04/13/2021    WBC 8.0 04/18/2017    HGB 13.7 04/13/2021    HGB 14.3 04/18/2017    HCT 41.4 04/13/2021    HCT 43.3 04/18/2017     04/13/2021     04/18/2017     BMP:   Lab Results   Component Value Date     04/13/2021     03/17/2017    POTASSIUM 3.8 04/13/2021    POTASSIUM 4.1 03/17/2017    CHLORIDE 110 (H) 04/13/2021    CHLORIDE 107 03/17/2017    CO2 25 04/13/2021    CO2 26 03/17/2017    BUN 12 04/13/2021    BUN 26 03/17/2017     CR 0.84 04/13/2021    CR 0.94 03/17/2017    GLC 96 04/13/2021    GLC 90 03/17/2017     COAGS:   Lab Results   Component Value Date    INR 0.66 10/19/2014     POC:   Lab Results   Component Value Date    HCG  05/03/2010     Negative   This test provides a presumptive diagnosis of pregnancy or non-pregnancy. A   confirmed pregnancy diagnosis should only be made by a physician after all   clinical and laboratory findings have been evaluated.    HCGS Negative 06/07/2017     HEPATIC:   Lab Results   Component Value Date    ALBUMIN 2.7 (L) 12/29/2016    PROTTOTAL 6.3 (L) 12/29/2016    ALT 16 12/29/2016    AST 18 12/29/2016    ALKPHOS 143 12/29/2016    BILITOTAL 0.3 12/29/2016     OTHER:   Lab Results   Component Value Date    GAUTAM 7.9 (L) 04/13/2021    LIPASE 86 06/19/2006    TSH 0.25 (L) 07/25/2017    T4 0.90 07/25/2017    CRP 49.4 (H) 03/07/2017    SED 5 11/04/2015       Anesthesia Plan    ASA Status:  2      Anesthesia Type: MAC.              Consents    Anesthesia Plan(s) and associated risks, benefits, and realistic alternatives discussed. Questions answered and patient/representative(s) expressed understanding.     - Discussed with:  Patient         Postoperative Care    Pain management: Oral pain medications, IV analgesics.   PONV prophylaxis: Ondansetron (or other 5HT-3)     Comments:    Propofol gtt with small precedex boluses            Ed Brooke MD

## 2021-05-17 NOTE — H&P
H/P updated on paper chart.   No change from previous    Heart: S1 and S2, no MRG  Lungs: Clear to asculation bilaterally     Brendan Hanson M.D.

## 2021-06-01 NOTE — TELEPHONE ENCOUNTER
"Palpitations have been intermittent.    When she has an episode it lasts for about an hour     It feels like extra beats and they feel \"hard\"    Is feeling weak and tired and seems to be getting worse    Feels shortness of breath     Feeling lightheaded    No chest pain     Advised going into the ER for further Evaluation.    Sindi Yanez RN   [facility] Medication Refill and Triage Nurse  3:05 PM  9/22/2019      Reason for Disposition    Dizziness, lightheadedness, or weakness    Protocols used: HEART RATE AND HEARTBEAT [id number]      "

## 2021-06-03 VITALS
SYSTOLIC BLOOD PRESSURE: 120 MMHG | HEIGHT: 67 IN | RESPIRATION RATE: 16 BRPM | BODY MASS INDEX: 26.84 KG/M2 | DIASTOLIC BLOOD PRESSURE: 80 MMHG | WEIGHT: 171 LBS | HEART RATE: 64 BPM

## 2021-06-03 VITALS — BODY MASS INDEX: 27 KG/M2 | WEIGHT: 172 LBS

## 2021-06-04 VITALS
OXYGEN SATURATION: 98 % | SYSTOLIC BLOOD PRESSURE: 122 MMHG | HEART RATE: 80 BPM | TEMPERATURE: 98.9 F | RESPIRATION RATE: 20 BRPM | BODY MASS INDEX: 25.06 KG/M2 | WEIGHT: 160 LBS | DIASTOLIC BLOOD PRESSURE: 80 MMHG

## 2021-06-04 VITALS
DIASTOLIC BLOOD PRESSURE: 68 MMHG | OXYGEN SATURATION: 97 % | HEIGHT: 67 IN | WEIGHT: 161 LBS | HEART RATE: 80 BPM | SYSTOLIC BLOOD PRESSURE: 122 MMHG | BODY MASS INDEX: 25.27 KG/M2

## 2021-06-04 VITALS — BODY MASS INDEX: 25.11 KG/M2 | HEIGHT: 67 IN | WEIGHT: 160 LBS

## 2021-06-08 NOTE — PROGRESS NOTES
Preoperative Exam    Scheduled Procedure: LAPAROSCOPIC LEFT OVARIAN CYSTECTOMY, REPAIR VAGINAL CUFF   Surgery Date:  5/21/2020  Surgery Location: [facility], fax [phone]    Surgeon:  Jim Ramirez MD    Assessment/Plan:     Paulette was seen today for pre-op exam.    Pre-op exam, Cyst of left ovary, vaginal bleeding  -     Hemoglobin    Surgical Procedure Risk: Low (reported cardiac risk generally < 1%)  Have you had prior anesthesia?: Yes  Have you or any family members had a previous anesthesia reaction:  Yes: vomit when waking up  Do you or any family members have a history of a clotting or bleeding disorder?: No  Cardiac Risk Assessment: no increased risk for major cardiac complications    APPROVAL GIVEN to proceed with proposed procedure, without further diagnostic evaluation    Functional Status: Independent  Patient plans to recover at home with family.     Subjective:      Paulette Singer is a 34 y.o. female who presents for a preoperative consultation.  Left ovarian cyst.  H/o hysterectomy but still with vaginal bleeding; anticipate repair of vaginal cuff as well    All other systems reviewed and are negative, other than those listed in the HPI.    Pertinent History  Do you have difficulty breathing or chest pain after walking up a flight of stairs: No  History of obstructive sleep apnea: No  Steroid use in the last 6 months: No  Frequent Aspirin/NSAID use: No  Prior Blood Transfusion: No  Prior Blood Transfusion Reaction: No  If for some reason prior to, during or after the procedure, if it is medically indicated, would you be willing to have a blood transfusion?:  There is no transfusion refusal.    Current Outpatient Medications   Medication Sig Dispense Refill     ibuprofen (ADVIL,MOTRIN) 200 MG tablet Take 600 mg by mouth every 6 (six) hours as needed for pain.       No current facility-administered medications for this visit.         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      Hiatal hernia     as a      Hyperthyroidism in pregnancy, antepartum      PONV (postoperative nausea and vomiting)        Past Surgical History:   Procedure Laterality Date     HYSTERECTOMY         Social History     Socioeconomic History     Marital status:      Spouse name: Not on file     Number of children: Not on file     Years of education: Not on file     Highest education level: Not on file   Occupational History     Not on file   Social Needs     Financial resource strain: Not on file     Food insecurity     Worry: Not on file     Inability: Not on file     Transportation needs     Medical: Not on file     Non-medical: Not on file   Tobacco Use     Smoking status: Never Smoker     Smokeless tobacco: Never Used   Substance and Sexual Activity     Alcohol use: Yes     Drug use: Never     Sexual activity: Not on file   Lifestyle     Physical activity     Days per week: Not on file     Minutes per session: Not on file     Stress: Not on file   Relationships     Social connections     Talks on phone: Not on file     Gets together: Not on file     Attends [employer] service: Not on file     Active member of club or organization: Not on file     Attends meetings of clubs or organizations: Not on file     Relationship status: Not on file     Intimate partner violence     Fear of current or ex partner: Not on file     Emotionally abused: Not on file     Physically abused: Not on file     Forced sexual activity: Not on file   Other Topics Concern     Not on file   Social History Narrative     Not on file       Patient Care Team:  Provider, No Primary Care as PCP - General          Objective:     Vitals:    20 1540   BP: 122/68   Pulse: 80  "  SpO2: 97%   Weight: 161 lb (73 kg)   Height: 5' 6.75\" (1.695 m)         Physical Exam:    Objective:    Physical Exam   Vitals:    05/19/20 1540   BP: 122/68   Pulse: 80   SpO2: 97%      Constitutional: Patient is oriented to person, place, and time. Patient appears well-developed and well-nourished. No distress.   Head: Normocephalic and atraumatic.   Right Ear: External ear normal. Normal TM  Left Ear: External ear normal. Normal TM  Nose: Nose normal.   Mouth/Throat: Oropharynx is clear and moist. No oropharyngeal exudate.   Eyes: Conjunctivae and EOM are normal. Pupils are equal, round, and reactive to light. Right eye exhibits no discharge. Left eye exhibits no discharge. No scleral icterus.   Neck: Neck supple. No JVD present. No tracheal deviation present. No thyromegaly present.   Cardiovascular: Normal rate, regular rhythm, normal heart sounds and intact distal pulses. No murmur heard.   Pulmonary/Chest: Effort normal and breath sounds normal. No stridor. No respiratory distress. Patient has no wheezes, no rales, exhibits no tenderness.   Abdominal: Soft. Bowel sounds are normal. Patient exhibits no distension and no mass. There is no tenderness. There is no rebound and no guarding.   Lymphadenopathy:  Patient has no cervical adenopathy.   Neurological: Patient is alert and oriented to person, place, and time. Patient has normal reflexes. No cranial nerve deficit. Coordination normal.   Skin: Skin is warm and dry. No rash noted. Patient is not diaphoretic. No erythema. No pallor.     Results for orders placed or performed in visit on 05/19/20   Hemoglobin   Result Value Ref Range    Hemoglobin 15.7 12.0 - 16.0 g/dL        There is no immunization history on file for this patient.        Electronically signed by Jori Fulton MD 05/19/20 3:34 PM  "

## 2021-06-08 NOTE — ANESTHESIA POSTPROCEDURE EVALUATION
Patient: Paulette Singer  Procedure(s):  LAPAROSCOPIC EXCISION OF ENDOMETREOSIS, REPAIR VAGINAL CUFF (Left)  Anesthesia type: general    Patient location: PACU  Last vitals:   Vitals Value Taken Time   /67 5/21/2020 10:01 AM   Temp 36.1  C (97  F) 5/21/2020  8:50 AM   Pulse 71 5/21/2020 10:12 AM   Resp 16 5/21/2020 10:00 AM   SpO2 96 % 5/21/2020 10:12 AM   Vitals shown include unvalidated device data.  Post vital signs: stable  Level of consciousness: awake and responds to simple questions  Post-anesthesia pain: pain controlled  Post-anesthesia nausea and vomiting: no  Pulmonary: unassisted, return to baseline  Cardiovascular: stable and blood pressure at baseline  Hydration: adequate  Anesthetic events: no    QCDR Measures:  ASA# 11 - Sharonda-op Cardiac Arrest: ASA11B - Patient did NOT experience unanticipated cardiac arrest  ASA# 12 - Sharonda-op Mortality Rate: ASA12B - Patient did NOT die  ASA# 13 - PACU Re-Intubation Rate: ASA13B - Patient did NOT require a new airway mgmt  ASA# 10 - Composite Anes Safety: ASA10A - No serious adverse event    Additional Notes:

## 2021-06-08 NOTE — ANESTHESIA PREPROCEDURE EVALUATION
Anesthesia Evaluation      History of anesthetic complications: PONV.     Airway   Mallampati: II  Neck ROM: full   Pulmonary - negative ROS and normal exam                          Cardiovascular - negative ROS and normal exam   Neuro/Psych - negative ROS     Endo/Other       GI/Hepatic/Renal    (+) hiatal hernia,   chronic renal disease,           Dental - normal exam                        Anesthesia Plan  Planned anesthetic: general endotracheal    ASA 2   Induction: intravenous   Anesthetic plan and risks discussed with: patient    Post-op plan: routine recovery

## 2021-06-08 NOTE — ANESTHESIA CARE TRANSFER NOTE
Last vitals:   Vitals:    05/21/20 0850   BP: 114/60   Pulse: 83   Resp: 16   Temp: 36.1  C (97  F)   SpO2: 100%     Patient's level of consciousness is drowsy  Spontaneous respirations: yes  Maintains airway independently: yes  Dentition unchanged: yes  Oropharynx: oropharynx clear of all foreign objects    QCDR Measures:  ASA# 20 - Surgical Safety Checklist: WHO surgical safety checklist completed prior to induction    PQRS# 430 - Adult PONV Prevention: 4558F - Pt received => 2 anti-emetic agents (different classes) preop & intraop  ASA# 8 - Peds PONV Prevention: NA - Not pediatric patient, not GA or 2 or more risk factors NOT present  PQRS# 424 - Sharonda-op Temp Management: 4559F - At least one body temp DOCUMENTED => 35.5C or 95.9F within required timeframe  PQRS# 426 - PACU Transfer Protocol: - Transfer of care checklist used  ASA# 14 - Acute Post-op Pain: ASA14B - Patient did NOT experience pain >= 7 out of 10

## 2021-06-08 NOTE — TELEPHONE ENCOUNTER
CO2 30-32 New Appointment Needed  What is the reason for the visit:    Pre-Op Appt Request  When is the surgery? :  5/21  Where is the surgery?:   [facility]  Who is the surgeon? :  Dr. Ramirez  What type of surgery is being done?: Cyst on overy  Provider Preference: Any available new patient   How soon do you need to be seen?: asap  Waitlist offered?: No  Okay to leave a detailed message:  Yes    Patient is new to [facility].

## 2021-06-08 NOTE — TELEPHONE ENCOUNTER
New Appointment Needed  What is the reason for the visit:  Patient is a new patient with no primary care provider and needs a pre op visit for her surgery that is on 5/21. She lives in [place]. Is there a provider at either [facility], or [facility] that would be willing to take on a new patient and do a pre op for her for Laporscopic Salpingectomy with Dr. ANDREW Ramirez from [facility]   Pre-Op Appt Request  When is the surgery? :  5/22  Where is the surgery?:  [facility]   Who is the surgeon? :  ANDREW Ramirez   What type of surgery is being done?: Laporscopic Salpingectomy   Provider Preference: Any before 5/22   How soon do you need to be seen?: ASAP  Waitlist offered?:No  Okay to leave a detailed message:  Yes.    If cannot find anyone to do pre op at [facility] or [facility], any location would be okay with patient

## 2021-06-08 NOTE — TELEPHONE ENCOUNTER
Reached out to patient and left a message to return phone call. Please place patient in a huddle and reach out to the Vocera so I can assist with scheduling. Thank you, Catherine Hart

## 2021-06-10 ENCOUNTER — VIRTUAL VISIT (OUTPATIENT)
Dept: UROLOGY | Facility: CLINIC | Age: 36
End: 2021-06-10
Payer: COMMERCIAL

## 2021-06-10 DIAGNOSIS — Q62.5 DUPLICATED RIGHT RENAL COLLECTING SYSTEM: Primary | ICD-10-CM

## 2021-06-10 PROCEDURE — 99024 POSTOP FOLLOW-UP VISIT: CPT | Performed by: UROLOGY

## 2021-06-10 NOTE — TELEPHONE ENCOUNTER
Patient calling reporting she vomited once this afternoon. Taking good fluid intake. Voiding. Per guideline, advised home care disposition.     Olivier Olson RN  [facility]     COVID 19 Nurse Triage Plan/Patient Instructions    Please be aware that novel coronavirus (COVID-19) may be circulating in the community. If you develop symptoms such as fever, cough, or SOB or if you have concerns about the presence of another infection including coronavirus (COVID-19), please contact your health care provider or visit [email or web address].     Disposition/Instructions    Home care recommended. Follow home care protocol based instructions.    Thank you for taking steps to prevent the spread of this virus.  o Limit your contact with others.  o Wear a simple mask to cover your cough.  o Wash your hands well and often.    Resources    [facility]: About COVID-19: [email or web address]    CDC: What to Do If You're Sick: www.cdc.gov/coronavirus/2019-ncov/about/steps-when-sick.html    CDC: Ending Home Isolation: www.cdc.gov/coronavirus/2019-ncov/hcp/disposition-in-home-patients.html     CDC: Caring for Someone: www.cdc.gov/coronavirus/2019-ncov/if-you-are-sick/care-for-someone.html     [facility]: Interim Guidance for Hospital Discharge to Home: www.health.[facility].[place].us/diseases/coronavirus/hcp/hospdischarge.pdf    [facility] clinical trials (COVID-19 research studies): [email or web address]     Below are the COVID-19 hotlines at the [facility] of Health ([facility]). Interpreters are available.   o For health questions: Call [phone] or 1-821.704.7505 (7 a.m. to 7 p.m.)  o For questions about schools and childcare: Call 457-433-7412 or 1-303.482.4033 (7 a.m. to 7 p.m.)       Reason for Disposition    MILD or MODERATE vomiting (e.g., 1 - 5 times / day)    Additional Information    Negative: Shock suspected (e.g., cold/pale/clammy skin, too weak to stand, low BP, rapid  "pulse)    Negative: Difficult to awaken or acting confused (e.g., disoriented, slurred speech)    Negative: Sounds like a life-threatening emergency to the triager    Negative: [1] Vomiting AND [2] contains red blood or black (\"coffee ground\") material  (Exception: few red streaks in vomit that only happened once)    Negative: Severe pain in one eye    Negative: Recent head injury (within last 3 days)    Negative: Recent abdominal injury (within last 3 days)    Negative: [1] Insulin-dependent diabetes (Type I) AND [2] glucose > 400 mg/dl (22 mmol/l)    Negative: [1] SEVERE vomiting (e.g., 6 or more times/day) AND [2] present > 8 hours    Negative: [1] MODERATE vomiting (e.g., 3 - 5 times/day) AND [2] age > 60    Negative: Severe headache (e.g., excruciating) (Exception: similar to previous migraines)    Negative: High-risk adult (e.g., diabetes mellitus, brain tumor, V-P shunt, hernia)    Negative: [1] Drinking very little AND [2] dehydration suspected (e.g., no urine > 12 hours, very dry mouth, very lightheaded)    Negative: Patient sounds very sick or weak to the triager    Negative: [1] Vomiting AND [2] abdomen looks much more swollen than usual    Negative: [1] Vomiting AND [2] contains bile (green color)    Negative: [1] Constant abdominal pain AND [2] present > 2 hours    Negative: [1] Fever > 103 F (39.4 C) AND [2] not able to get the fever down using Fever Care Advice    Negative: [1] Fever > 101 F (38.3 C) AND [2] age > 60    Negative: [1] Fever > 100.0 F (37.8 C) AND [2] bedridden (e.g., nursing home patient, CVA, chronic illness, recovering from surgery)    Negative: [1] Fever > 100.0 F (37.8 C) AND [2] weak immune system (e.g., HIV positive, cancer chemo, splenectomy, organ transplant, chronic steroids)    Negative: Taking any of the following medications: digoxin (Lanoxin), lithium, theophylline, phenytoin (Dilantin)    Negative: [1] MILD or MODERATE vomiting AND [2] present > 48 hours (2 days) " (Exception: mild vomiting with associated diarrhea)    Negative: Fever present > 3 days (72 hours)    Negative: Vomiting a prescription medication    Negative: [1] MILD vomiting with diarrhea AND [2] present > 5 days    Negative: Alcohol or drug abuse, known or suspected    Negative: Vomiting is a chronic symptom (recurrent or ongoing AND present > 4 weeks)    Negative: [1] SEVERE vomiting (e.g., 6 or more times/day, vomits everything) BUT [2] hydrated    Protocols used: VOMITING-A-AH

## 2021-06-10 NOTE — PROGRESS NOTES
"[facility]   CHIEF COMPLAINT   It was my pleasure to see Paulette Singer who is a 36 year old female for follow-up of LEFT ureteroureterostomy.      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  She is status post a robotic ureteroureterostomy on 4/12/2021.  During this surgery she had placement of a 5 Tristanian by 26 cm ureteral stent into each of the upper and lower pole moieties. She then underwent cystoscopy, Retrograde Pyelogram and ureteral stent removal on 5/10/21.     TODAY 6/10/21:  Doing very well since the stents removed   She notes a near complete resolution of her prior left flank and groin pain as well as her pelvic floor dysfunction.  She notes now she will only have a very infrequent left abdominal \"twinge\"which seems possibly related to either her menstrual cycle or bowel movements.  She is extremely happy with the outcome from surgery and very grateful    PHYSICAL EXAM  Patient is a 36 year old  female   Vitals: Last menstrual period 07/30/2018, not currently breastfeeding.  There is no height or weight on file to calculate BMI.  General Appearance Adult:   Alert, no acute distress, oriented  HENT: throat/mouth:normal, good dentition  Lungs: no respiratory distress, or pursed lip breathing  Heart: No obvious jugular venous distension present  Abdomen: non - distended  Musculoskeltal: extremities normal, no peripheral edema  Skin: no suspicious lesions or rashes  Neuro: Alert, oriented, speech and mentation normal  Psych: affect and mood normal  Gait: Normal     UA RESULTS:  Recent Labs   Lab Test 08/06/18  1147 03/17/17  1545 03/17/17  1545   COLOR Straw   < > Yellow   APPEARANCE Clear   < > Clear   URINEGLC Negative   < > Negative   URINEBILI Negative   < > Negative   URINEKETONE Negative   < > " Negative   SG 1.005   < > 1.010   UBLD Negative   < > Moderate*   URINEPH 7.0   < > 5.5   PROTEIN Negative   < > Negative   UROBILINOGEN  --   --  0.2   NITRITE Negative   < > Negative   LEUKEST Negative   < > Small*   RBCU <1   < > 2-5*   WBCU 1   < > 5-10*    < > = values in this interval not displayed.      Creatinine   Date Value Ref Range Status   04/13/2021 0.84 0.52 - 1.04 mg/dL Final     IMAGING:  All pertinent imaging reviewed:    All imaging studies reviewed by me.  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  She is now status post a robotic ureteroureterostomy on 4/12/2021 followed by cystoscopy, left retrograde pyelogram, and left ureteral stent removal on 5/10/2021.    -She is doing extremely well after surgery with complete resolution of her prior left flank and groin pain  -She is very happy with the outcome from surgery and very thankful  -We discussed that we will plan for a follow-up nuclear medicine renal scan in about 1 month, 3 months after surgery, to ensure good drainage of the upper pole moiety  -We will plan for virtual visit to review the results if necessary      Time spent: 20 minutes spent on the date of the encounter doing chart review, history and exam, documentation and further activities as noted above.    Brendan Hanson MD   Urology  [facility]  [facility] Phone: [phone]  [facility] Phone: [phone]      Paulette Singer  who is being evaluated via a billable video visit.      How would you like to obtain your AVS? MyChart  If the video visit is dropped, the invitation should be resent by: Text to cell phone: [phone]  Will  anyone else be joining your video visit? No    Video-Visit Details    Type of service:  Video Visit    Video Start Time: 10:30 AM    Video End Time:10:45 AM    Originating Location (pt. Location): Home    Distant Location (provider location):  [facility]     Platform used for Video Visit: CinemaKi

## 2021-06-13 NOTE — PROGRESS NOTES
Clinic Care Coordination Contact  Community Health Worker Initial Outreach    Patient said, she continues to improve everyday.  She declined establishing care or scheduling a ED follow up. She has a good understanding of the discharge instructions.

## 2021-06-17 NOTE — PATIENT INSTRUCTIONS - HE
"Patient Instructions by Ed Magaña PA-C at 5/19/2019  9:10 AM     Author: Ed Magaña PA-C Service: -- Author Type: Physician Assistant    Filed: 5/19/2019 10:16 AM Encounter Date: 5/19/2019 Status: Addendum    : Ed Magaña PA-C (Physician Assistant)    Related Notes: Original Note by Ed Magaña PA-C (Physician Assistant) filed at 5/19/2019 10:15 AM       You were seen today for acute bronchitis.     Symptom management:  - Get plenty of rest  - Avoid smoking and second hand smoke  - May take tylenol or ibuprofen for fever/discomfort  - Drink plenty of non-caffeinated fluids  - Use nasal steroid spray for sinus congestion  - Albuterol inhaler may be used every 6 hours as needed for chest tightness      Reasons to be seen in the emergency room:  - Develop a fever of 100.4 or higher  - Cough changes, coughing up blood, or become short of breath  - Neck stiffness  - Chest pain  - Severe headache  - Unable to tolerate eating or drinking fluids    Otherwise, if no symptom improvement after 5 days, follow-up with your primary care provider.      Patient Education     Viral Syndrome (Adult)  A viral illness may cause a number of symptoms. The symptoms depend on the part of the body that the virus affects. If it settles in your nose, throat, and lungs, it may cause cough, sore throat, congestion, and sometimes headache. If it settles in your stomach and intestinal tract, it may cause vomiting and diarrhea. Sometimes it causes vague symptoms like \"aching all over,\" feeling tired, loss of appetite, or fever.  A viral illness usually lasts 1 to 2 weeks, but sometimes it lasts longer. In some cases, a more serious infection can look like a viral syndrome in the first few days of the illness. You may need another exam and additional tests to know the difference. Watch for the warning signs listed below.  Home care  Follow these guidelines for taking care of yourself at home:    If symptoms are severe, rest at " home for the first 2 to 3 days.    Stay away from cigarette smoke - both your smoke and the smoke from others.    You may use over-the-counter acetaminophen or ibuprofen for fever, muscle aching, and headache, unless another medicine was prescribed for this. If you have chronic liver or kidney disease or ever had a stomach ulcer or GI bleeding, talk with your doctor before using these medicines. No one who is younger than 18 and ill with a fever should take aspirin. It may cause severe disease or death.    Your appetite may be poor, so a light diet is fine. Avoid dehydration by drinking 8 to 12 8-ounce glasses of fluids each day. This may include water; orange juice; lemonade; apple, grape, and cranberry juice; clear fruit drinks; electrolyte replacement and sports drinks; and decaffeinated teas and coffee. If you have been diagnosed with a kidney disease, ask your doctor how much and what types of fluids you should drink to prevent dehydration. If you have kidney disease, drinking too much fluid can cause it build up in the your body and be dangerous to your health.    Over-the-counter remedies won't shorten the length of the illness but may be helpful for cough, sore throat; and nasal and sinus congestion. Don't use decongestants if you have high blood pressure.  Follow-up care  Follow up with your healthcare provider if you do not improve over the next week.  Call 911  Call 911 if any of the following occur:    Convulsion    Feeling weak, dizzy, or like you are going to faint    Chest pain, shortness of breath, wheezing, or difficulty breathing  When to seek medical advice  Call your healthcare provider right away if any of these occur:    Cough with lots of colored sputum (mucus) or blood in your sputum    Chest pain, shortness of breath, wheezing, or difficulty breathing    Severe headache; face, neck, or ear pain    Severe, constant pain in the lower right side of your belly (abdominal)    Continued vomiting  (cant keep liquids down)    Frequent diarrhea (more than 5 times a day); blood (red or black color) or mucus in diarrhea    Feeling weak, dizzy, or like you are going to faint    Extreme thirst    Fever of 100.4 F (38 C) or higher, or as directed by your healthcare provider  Date Last Reviewed: 9/25/2015 2000-2017 [employer]. [address] [place] [address]. All rights reserved. This information is not intended as a substitute for professional medical care. Always follow your healthcare professional's instructions.           Patient Education     Bronchitis, Antibiotic Treatment (Adult)    Bronchitis is an infection of the air passages (bronchial tubes) in your lungs. It often occurs when you have a cold. This illness is contagious during the first few days and is spread through the air by coughing and sneezing, or by direct contact (touching the sick person and then touching your own eyes, nose, or mouth).  Symptoms of bronchitis include cough with mucus (phlegm) and low-grade fever. Bronchitis usually lasts 7 to 14 days. Mild cases can be treated with simple home remedies. More severe infection is treated with an antibiotic.  Home care  Follow these guidelines when caring for yourself at home:    If your symptoms are severe, rest at home for the first 2 to 3 days. When you go back to your usual activities, don't let yourself get too tired.    Do not smoke. Also avoid being exposed to secondhand smoke.    You may use over-the-counter medicines to control fever or pain, unless another medicine was prescribed. If you have chronic liver or kidney disease or have ever had a stomach ulcer or gastrointestinal bleeding, talk with your healthcare provider before using these medicines. Also talk to your provider if you are taking medicine to prevent blood clots. Aspirin should never be given to anyone younger than 18 years of age who is ill with a viral infection or fever. It may cause severe  liver or brain damage.    Your appetite may be poor, so a light diet is fine. Avoid dehydration by drinking 6 to 8 glasses of fluids per day (such as water, soft drinks, sports drinks, juices, tea, or soup). Extra fluids will help loosen secretions in the nose and lungs.    Over-the-counter cough, cold, and sore-throat medicines will not shorten the length of the illness, but they may be helpful to reduce symptoms. (Note: Do not use decongestants if you have high blood pressure.)    Finish all antibiotic medicine. Do this even if you are feeling better after only a few days.  Follow-up care  Follow up with your healthcare provider, or as advised. If you had an X-ray or ECG (electrocardiogram), a specialist will review it. You will be notified of any new findings that may affect your care.  If you are age 65 or older, or if you have a chronic lung disease or condition that affects your immune system, or you smoke, ask your healthcare provider about getting a pneumococcal vaccine and a yearly flu shot (influenza vaccine).  When to seek medical advice  Call your healthcare provider right away if any of these occur:    Fever of 100.4 F (38 C) or higher, or as directed by your healthcare provider    Coughing up increased amounts of colored sputum    Weakness, drowsiness, headache, facial pain, ear pain, or a stiff neck  Call 911  Call 911 if any of these occur.    Coughing up blood    Worsening weakness, drowsiness, headache, or stiff neck    Trouble breathing, wheezing, or pain with breathing  Date Last Reviewed: 9/13/2015 2000-2017 [employer]. [address] [place] [address], [place] [address]. All rights reserved. This information is not intended as a substitute for professional medical care. Always follow your healthcare professional's instructions.

## 2021-06-17 NOTE — PATIENT INSTRUCTIONS - HE
Patient Instructions by Hema Bae MD at 9/30/2019  9:50 AM     Author: Hema Bae MD Service: -- Author Type: Physician    Filed: 9/30/2019 10:22 AM Encounter Date: 9/30/2019 Status: Addendum    : Hema Bae MD (Physician)    Related Notes: Original Note by Hema Bae MD (Physician) filed at 9/30/2019 10:21 AM       Below is a list of instructions we discussed today in clinic:   1. Your symptoms are likely related to PVCs. These are benign and not likely to cause problems but can be symptomatic.  2. Avoiding triggers can help symptoms as can increasing magnesium through diet or supplementation (slow mag)  3. Follow up as needed or if required by the Holter Monitor.    Increasing the magnesium in your diet may help to suppress your PVCs. These foods include the following    Dark chocolate    Avocados    Nuts, such as almonds, cashews, Brazil nuts    Legumes, such as lentils, beans, chickpeas, peas, soy beans    Tofu    Seeds, such as flax, pumpkin, and rylee    Whole grains such as Quinoa and buckwheat    Certain kinds of fish, such as Brewer, Mackerel, and Halibut    Bananas    Leafy greens, such as kale, spinach, and collared greens      You should receive a phone call from this office informing you of test or procedure results within 3 business days of the test being performed.  If you do not hear from our office with the test results within 1 week please do not hesitate to call asking for these results.     It was a pleasure to meet with you today in clinic.  Please do not hesitate to call the [facility] clinic with any questions or concerns at [phone].    Sincerely,     Hema Bae MD  Non-invasive Cardiology  [facility]    Patient Education     Premature Ventricular Contractions  Premature ventricular contractions (PVCs) are a type of arrhythmia (irregular heartbeat). They are common and can affect people of all ages. PVCs are almost  never dangerous. But if other heart problems are present, PVCs can cause serious health issues. This sheet tells you more about PVCs and how they are treated.  Understanding Your Hearts Electrical System     During a normal heartbeat, electrical signals start at the SA node and are sent through the walls of the hearts chambers.   To understand how PVCs occur, it helps to first understand how your heart works. Your heart is a muscle that pumps blood throughout the body. It is made up of 4 chambers: 2 atria and 2 ventricles. Electrical signals are sent to these chambers, making them contract (squeeze) in a certain order. This rhythm, which pumps blood through and out of your heart, is your heartbeat. The process begins in the sinoatrial (SA) or sinus node. This is the heart's natural pacemaker:    The SA node. This group of cells in the right atrium sends a signal to both atria, telling them to contract. When the atria contract, blood is pumped into the ventricles.     The AV node. This is another group of cells in the right atrium. It receives the signal from the SA node after it passes through the atria. The AV node transmits the electrical signal from the atria to the ventricles.   What Happens During a PVC?  During a PVC, an abnormal signal disrupts the normal heartbeat. This signal comes from the ventricle instead of the SA node. The signal causes the ventricles to contract too soon, and the heart skips the next normal beat. This results in an irregular heartbeat.  What Are the Symptoms of PVCs?  Sometimes PVCs cause no symptoms at all. Other times, a patient may feel palpitations (irregular heartbeats). These can feel like skipped beats, or flopping in the chest. If PVCs are frequent, other symptoms can occur. These include tiredness, feeling faint, or shortness of breath. They also include fullness or pressure in the neck, and chest pain. These symptoms occur because less oxygen is delivered to the body. This  is because PVCs make the heart pump blood less effectively.  What Causes PVCs?  In some cases, no cause of PVCs is found. When a cause is found, it is either chemical or structural:    Chemical. Changes in the bodys chemistry can prompt PVCs. For instance, raised levels of certain hormones, such as adrenaline or thyroid, can cause PVCs. Consuming substances such as alcohol and caffeine can also cause them.    Structural. This involves existing problems in the heart and/or cardiovascular system. Coronary artery disease (CAD) is 1 type of problem that can be related to PVCs. Others are heart failure and heart valve problems.   How Are PVCs Diagnosed?  The doctor will take your medical history and ask you to describe your symptoms. Youll also have a physical exam. And certain tests may be done. These include:    Electrocardiogram (ECG or EKG). This test records the electrical activity of your heart. During an ECG, small pads (electrodes) are placed on your chest, arms, and legs. Wires connect the pads to a machine, which records your hearts electrical signals.    Heart monitor. There are 2 types of external heart monitors:  ? Holter monitor. This monitor can be worn for 1 to 7 days. It provides a constant recording of heart activity. After the test is done, your health care provider analyzes the recording.  ? Event monitors. These monitors can be used for 3 to 4 weeks. One kind is a memory loop recorder. This monitor records constantly, but stores the recording only when you press a button. The other kind is a credit card-sized recorder. This monitor is turned on only during an episode. With both types, you send the recordings of symptoms to your health care provider over the phone.  How Are PVCs Treated?  Treatment depends on whether structural heart problems are present. It is also determined by the severity of symptoms:    If you have no other heart problems and your symptoms are not bothersome, treatment may not be  needed. If it is needed, treatment can involve:  ? Lifestyle changes. Your doctor may suggest that you exercise and limit caffeine and alcohol. If you smoke, youll be advised to quit.  ? Medications. Two types of medication can help with PVCs. Beta-blockers and calcium channel blockers both can lower the heart rate and reduce blood pressure.    If you have structural heart problems, youll likely be referred to a doctor who specializes in heart rhythm problems. This may be a cardiologist or an electrophysiologist. An electrophysiologist is a cardiologist who specializes in the electrical system of the heart. You will need a referral because for you, PVCs can be a bigger threat to your health. Depending on the nature of your heart disease, you may need treatment for an underlying heart problem. In severe cases, an ICD (implantable cardioverter defibrillator) may be implanted. This is done to treat the underlying heart disease. It can help normalize the heart rhythm.  Date Last Reviewed: 3/7/2014    3975-6069 The [employer]. [address] [place] [address], [place] [address]. All rights reserved. This information is not intended as a substitute for professional medical care. Always follow your healthcare professional's instructions.

## 2021-06-18 NOTE — PATIENT INSTRUCTIONS - HE
Patient Instructions by Ed Magaña PA-C at 7/31/2020  5:20 PM     Author: Ed Magaña PA-C Service: -- Author Type: Physician Assistant    Filed: 7/31/2020  6:16 PM Encounter Date: 7/31/2020 Status: Addendum    : Ed Magaña PA-C (Physician Assistant)    Related Notes: Original Note by Ed Magaña PA-C (Physician Assistant) filed at 7/31/2020  6:13 PM       Hot packs to the area 3 times per day  Take antibiotic as written.  Follow-up with your female care provider next week for breast exam and to ensure that the hot packs and antibiotics are resolving the cyst on the left axilla.      Patient Education     Epidermoid Cyst (Sebaceous Cyst), Infected (Antibiotic Treatment)  You have an epidermoid cyst. This is a small, painless lump under your skin. An epidermoid cyst (often called a sebaceous cyst, epidermal cyst, or epidermal inclusion cyst) is a term most often used for 2 similar types of cysts:    Epidermoid cysts. These cysts form slowly under the skin. They can be found on most parts of the body. But they are most often found on areas with more hair such as the scalp, face, upper back, and genitals.    Pilar cysts. These are similar to epidermoid cysts. But they start from a different part of the hair follicle. They are more likely to be on the scalp.  Here are some general facts about these cysts:    A cyst is a sac filled with material that is often cheesy, fatty, oily, or stringy. The material inside can be thick. Or it can be a liquid.    You can usually move the cyst slightly if you try.    The cysts can be smaller than a pea or as large as a few inches.    The cysts are usually not painful, unless they become inflamed or infected.    The area around the cyst may smell bad. If the cyst breaks open, the material inside it often smells bad as well.  Your cyst became infected and your healthcare provider wanted to treat it with antibiotics. If the antibiotics dont clear up the infection, the  cyst will need to be drained by making a small cut (incision). Local anesthesia will be used to numb the area.  Home care    Resist the temptation to squeeze or pop the cyst, stick a needle in it, or cut it open. This often leads to a worsening infection and scarring.    Take the antibiotic as directed until it is all used up.    Soak the affected area in hot water or apply a hot pack (a thin, clean towel soaked in hot water) for 20 minutes at a time. Do this 3 to 4 times a day.    Apply antibiotic cream or ointment 3 times a day.    You may use over-the-counter pain medicine to control pain, unless another medicine was given. If you have chronic liver or kidney disease or ever had a stomach ulcer or GI bleeding, talk with your healthcare provider before using these medicines.  Prevention  Once this infection has healed, reduce the risk of future infections by:    Keeping the cyst area clean by bathing or showering daily    Avoiding tight-fitting clothing in the cyst area  Follow-up care  Follow up with your healthcare provider, or as advised. If a gauze packing was put in your wound, it should be removed in a few days as advised by your healthcare provider. Check your wound every day for the signs listed below.  When to seek medical advice  Call your healthcare provider right away if any of these occur:    Pus coming from the cyst    Increasing redness around the wound    Increasing local pain or swelling    Fever of 100.4 F (38 C) or higher, or as directed by your provider  Date Last Reviewed: 10/5/2016    1485-1744 The [employer]. [address] [place] [address], [place] [address]. All rights reserved. This information is not intended as a substitute for professional medical care. Always follow your healthcare professional's instructions.           Patient Education     Fibrocystic Breast Changes (Presumed)  One or more lumps were found in your breasts. These are likely fibrocystic breast changes.   With this  condition, fibrous tissue and small cysts form in your breasts. They may increase and decrease in size with your menstrual cycle. Symptoms can include breast lumps that you can see and feel. You may also have breast pain, swelling, and tenderness.  The lumps associated with fibrocystic breast changes are not cancer, and do not lead to cancer. They are very common, and affect most women at some point in their lives.  Treatment for fibrocystic breast changes is rarely needed. Home care to relieve symptoms may be recommended, though.  If confirmation of the diagnosis is desired or needed, further evaluation may be done. This can include:    Another exam by your healthcare provider or a gynecologist    Imaging tests, such as a mammogram or ultrasound    Biopsy (procedure to remove small tissue samples from the breast lumps)  Youll be told more about these tests, if needed.  Home care    If you are having breast pain:  ? Take an over-the-counter pain reliever, if directed to by your provider.  ? Wear a well-fitted bra or sports bra for extra support. If you have breast pain at night, try wearing the bra during sleep.  ? Apply a warm compress (towel soaked in warm water) to the breast. You may also use a hot water bottle.    Check your breasts each day. Keep a log of whether the lump seems to be changing in size or tenderness with your period. This can help your healthcare provider learn more about your breast condition.    Ask your healthcare provider about lifestyle or diet changes or alternative treatments you can try to help treat this condition.  Follow-up care  Follow with your healthcare provider, or as directed. You may be advised to schedule another appointment for a breast exam with your provider or a gynecologist about 7 to 10 days after the start of your next period. If fibrocystic breast changes begin to cause symptoms that bother you, tell your provider. He or she may recommend other treatments.  When to  seek medical advice  Call your healthcare provider right away if any of these occur:    Fever of 100.4 F (38 C) or higher    Redness or swelling of the breasts    Discharge from the nipples    Visible changes in the skin over the nipples or breasts    Lumps grow larger, feel very hard, or have an irregular shape    New lumps form that look or feel different from current lumps  Date Last Reviewed: 3/1/2017    0077-7906 The [employer]. [address] [place] [address]. All rights reserved. This information is not intended as a substitute for professional medical care. Always follow your healthcare professional's instructions.           Patient Education     What Are Fibrocystic Breasts?    Do your breasts ever feel lumpy, sore, or tender? If so, you may have fibrocystic breasts. This is a very common condition. It is not a disease, and it is not cancer. Your healthcare provider can rule out problems and help you ease your symptoms.  Normal breasts  Your breasts are made up of 3 kinds of tissue:    Glandular tissue is made up of the milk ducts and glands.    Fibrous tissue supports the breast.    Fatty tissue fills the spaces between the other tissues. It gives the breast its size.  Fibrocystic breasts  Hormones are chemicals that control your menstrual cycle. Hormones can also make glandular tissue swell. This stretches fibrous tissue, making your breasts feel sore. Hormones may also cause one or more fluid-filled lumps to form. These lumps are called cysts. They may be large or small. Cysts are not cancer. This means they are benign. Just before your period, cysts may become larger. Your breasts may feel more sore.  What you may feel  Changes in hormone levels during your menstrual cycle affect your breasts. If you have fibrocystic breasts, your breasts may become sore or even painful. This can often happen before your period. Your breasts may also swell or feel lumpier at this time.  Caring for  your breasts  Breast self-awareness is the key to caring for your breasts. Self-awareness means knowing how your breasts normally look and feel. This helps you notice any changes right away. Call your provider if you notice new lumps or changes that feel different than normal. See your provider for regular exams. And have a mammogram as often as your provider suggests.   Date Last Reviewed: 9/1/2017 2000-2019 The [employer]. [address] [place] [address], [place] [address]. All rights reserved. This information is not intended as a substitute for professional medical care. Always follow your healthcare professional's instructions.           Patient Education     Breast Lump, Uncertain Cause    A lump was found in your breast. Most breast lumps are not cancer. They may be caused by normal changes in the breast tissue due to hormone variations that occur with your menstrual cycle. Some women may form lumps that are painful and tender. Others may form lumps that are painless.  At this time, is not possible to be certain of the cause of your lump without further evaluation. This could include:    Another exam by your healthcare provider or a gynecologist    Imaging tests, such as a mammogram or ultrasound    Biopsy (procedure to remove small tissue samples from the breast lump)  Your healthcare provider will explain any additional testing that is needed. Be sure to get answers to any questions you may have.  Home care  Until a diagnosis is made, you may be advised to do the following:    If you are having breast pain:  ? Take an over-the-counter pain reliever, if directed to by your provider.  ? Wear a well-fitted bra or sports bra for extra support. If you have breast pain at night, try wearing the bra during sleep.  ? Apply a warm compress (towel soaked in warm water) to the breast. You may also use a hot water bottle.    Check your breasts each day. Keep a log of whether the lump seems to be changing in size  or tenderness with your period. This can help your healthcare provider make the correct diagnosis.  Follow-up care  Follow up with your healthcare provider, or as directed. Keep all appointments. Also, prepare for any upcoming tests as directed.  When to seek medical advice  Call your healthcare provider right away if any of these occur:    Fever of 100.4 F (38 C) or higher    Redness or swelling of the breast    Discharge from the nipple    Visible changes in the skin over the nipple or breast    Lump grows larger, feels very hard, or has an irregular shape    New lumps form  Date Last Reviewed: 3/1/2017    1108-0729 [employer]. [address] [place] [address], [place] [address]. All rights reserved. This information is not intended as a substitute for professional medical care. Always follow your healthcare professional's instructions.

## 2021-06-27 NOTE — PROGRESS NOTES
Progress Notes by Ed Magaña PA-C at 5/19/2019  9:10 AM     Author: Ed Magaña PA-C Service: -- Author Type: Physician Assistant    Filed: 5/19/2019 11:53 AM Encounter Date: 5/19/2019 Status: Signed    : Ed Magaña PA-C (Physician Assistant)       Subjective:      Patient ID: Paulette Singer is a 33 y.o. female.    Chief Complaint:    HPI  Paulette Singer is a 33 y.o. female who presents today complaining of one day acute onset of Influenza like illness symptoms to include fever, sore throat, odynophagia, rhinorrhea, myalgias, arthralgias, headache and fatigue.  She has had a dry nonproductive cough over the last 1 week.  The new symptoms especially with the fever nasal congestion generalized myalgias and arthralgias are new onset.    Patient had acute onset of all the above symptoms.    Patient has not had a seasonal influenza immunization.    Last dose of antipyretic, Ibuprofen at 7:30 AM and her temperature has came down nicely temperature in the office is currently 98.7.    Anorexia: NO    Patient is taking some fluids and is micturating.      No past medical history on file.    No past surgical history on file.    No family history on file.    Social History     Tobacco Use   ? Smoking status: Never Smoker   ? Smokeless tobacco: Never Used   Substance Use Topics   ? Alcohol use: Not on file   ? Drug use: Not on file       Review of Systems  As above in HPI, otherwise balance of Review of Systems are negative.    Objective:     /71 (Patient Site: Right Arm, Patient Position: Sitting, Cuff Size: Adult Regular)   Pulse (!) 109   Temp 98.7  F (37.1  C) (Oral)   Resp 17   Wt 172 lb (78 kg)   SpO2 98%   Breastfeeding? No     Physical Exam  General: Patient is resting comfortably no acute distress is afebrile  HEENT: Head is normocephalic atraumatic   eyes are PERRL EOMI sclera anicteric   TMs are clear bilaterally  Throat is clear and no exudate  No cervical lymphadenopathy  present  LUNGS: With inspiratory and expiratory wheezes to auscultation bilaterally  HEART: Regular rate and rhythm  Skin: Without rash non-diaphoretic      Assessment:     Procedures    The primary encounter diagnosis was Nasal congestion. A diagnosis of Cough was also pertinent to this visit.    Plan:     1. Nasal congestion  Influenza A/B Rapid Test- Nasal Swab   2. Cough  azithromycin (ZITHROMAX Z-MABLE) 250 MG tablet    albuterol (PROAIR HFA;PROVENTIL HFA;VENTOLIN HFA) 90 mcg/actuation inhaler       Had a conversation with the patient stating that we will give her a respiratory antibiotic to help with her cough that she has had worsening over the last week.  I do think she has a viral illness that started out with the cough and now has the viral syndrome symptoms.  We will treat her symptomatically for cough with albuterol and over-the-counter dextromethorphan.  Instructions for return to clinic were gone over and questions were answered to patient's satisfaction before discharge.    Patient Instructions   You were seen today for acute bronchitis.     Symptom management:  - Get plenty of rest  - Avoid smoking and second hand smoke  - May take tylenol or ibuprofen for fever/discomfort  - Drink plenty of non-caffeinated fluids  - Use nasal steroid spray for sinus congestion  - Albuterol inhaler may be used every 6 hours as needed for chest tightness      Reasons to be seen in the emergency room:  - Develop a fever of 100.4 or higher  - Cough changes, coughing up blood, or become short of breath  - Neck stiffness  - Chest pain  - Severe headache  - Unable to tolerate eating or drinking fluids    Otherwise, if no symptom improvement after 5 days, follow-up with your primary care provider.      Patient Education     Viral Syndrome (Adult)  A viral illness may cause a number of symptoms. The symptoms depend on the part of the body that the virus affects. If it settles in your nose, throat, and lungs, it may cause  "cough, sore throat, congestion, and sometimes headache. If it settles in your stomach and intestinal tract, it may cause vomiting and diarrhea. Sometimes it causes vague symptoms like \"aching all over,\" feeling tired, loss of appetite, or fever.  A viral illness usually lasts 1 to 2 weeks, but sometimes it lasts longer. In some cases, a more serious infection can look like a viral syndrome in the first few days of the illness. You may need another exam and additional tests to know the difference. Watch for the warning signs listed below.  Home care  Follow these guidelines for taking care of yourself at home:    If symptoms are severe, rest at home for the first 2 to 3 days.    Stay away from cigarette smoke - both your smoke and the smoke from others.    You may use over-the-counter acetaminophen or ibuprofen for fever, muscle aching, and headache, unless another medicine was prescribed for this. If you have chronic liver or kidney disease or ever had a stomach ulcer or GI bleeding, talk with your doctor before using these medicines. No one who is younger than 18 and ill with a fever should take aspirin. It may cause severe disease or death.    Your appetite may be poor, so a light diet is fine. Avoid dehydration by drinking 8 to 12 8-ounce glasses of fluids each day. This may include water; orange juice; lemonade; apple, grape, and cranberry juice; clear fruit drinks; electrolyte replacement and sports drinks; and decaffeinated teas and coffee. If you have been diagnosed with a kidney disease, ask your doctor how much and what types of fluids you should drink to prevent dehydration. If you have kidney disease, drinking too much fluid can cause it build up in the your body and be dangerous to your health.    Over-the-counter remedies won't shorten the length of the illness but may be helpful for cough, sore throat; and nasal and sinus congestion. Don't use decongestants if you have high blood pressure.  Follow-up " care  Follow up with your healthcare provider if you do not improve over the next week.  Call 911  Call 911 if any of the following occur:    Convulsion    Feeling weak, dizzy, or like you are going to faint    Chest pain, shortness of breath, wheezing, or difficulty breathing  When to seek medical advice  Call your healthcare provider right away if any of these occur:    Cough with lots of colored sputum (mucus) or blood in your sputum    Chest pain, shortness of breath, wheezing, or difficulty breathing    Severe headache; face, neck, or ear pain    Severe, constant pain in the lower right side of your belly (abdominal)    Continued vomiting (cant keep liquids down)    Frequent diarrhea (more than 5 times a day); blood (red or black color) or mucus in diarrhea    Feeling weak, dizzy, or like you are going to faint    Extreme thirst    Fever of 100.4 F (38 C) or higher, or as directed by your healthcare provider  Date Last Reviewed: 9/25/2015 2000-2017 [employer]. [address] [place] [address]. All rights reserved. This information is not intended as a substitute for professional medical care. Always follow your healthcare professional's instructions.           Patient Education     Bronchitis, Antibiotic Treatment (Adult)    Bronchitis is an infection of the air passages (bronchial tubes) in your lungs. It often occurs when you have a cold. This illness is contagious during the first few days and is spread through the air by coughing and sneezing, or by direct contact (touching the sick person and then touching your own eyes, nose, or mouth).  Symptoms of bronchitis include cough with mucus (phlegm) and low-grade fever. Bronchitis usually lasts 7 to 14 days. Mild cases can be treated with simple home remedies. More severe infection is treated with an antibiotic.  Home care  Follow these guidelines when caring for yourself at home:    If your symptoms are severe, rest at home for the  first 2 to 3 days. When you go back to your usual activities, don't let yourself get too tired.    Do not smoke. Also avoid being exposed to secondhand smoke.    You may use over-the-counter medicines to control fever or pain, unless another medicine was prescribed. If you have chronic liver or kidney disease or have ever had a stomach ulcer or gastrointestinal bleeding, talk with your healthcare provider before using these medicines. Also talk to your provider if you are taking medicine to prevent blood clots. Aspirin should never be given to anyone younger than 18 years of age who is ill with a viral infection or fever. It may cause severe liver or brain damage.    Your appetite may be poor, so a light diet is fine. Avoid dehydration by drinking 6 to 8 glasses of fluids per day (such as water, soft drinks, sports drinks, juices, tea, or soup). Extra fluids will help loosen secretions in the nose and lungs.    Over-the-counter cough, cold, and sore-throat medicines will not shorten the length of the illness, but they may be helpful to reduce symptoms. (Note: Do not use decongestants if you have high blood pressure.)    Finish all antibiotic medicine. Do this even if you are feeling better after only a few days.  Follow-up care  Follow up with your healthcare provider, or as advised. If you had an X-ray or ECG (electrocardiogram), a specialist will review it. You will be notified of any new findings that may affect your care.  If you are age 65 or older, or if you have a chronic lung disease or condition that affects your immune system, or you smoke, ask your healthcare provider about getting a pneumococcal vaccine and a yearly flu shot (influenza vaccine).  When to seek medical advice  Call your healthcare provider right away if any of these occur:    Fever of 100.4 F (38 C) or higher, or as directed by your healthcare provider    Coughing up increased amounts of colored sputum    Weakness, drowsiness, headache,  facial pain, ear pain, or a stiff neck  Call 911  Call 911 if any of these occur.    Coughing up blood    Worsening weakness, drowsiness, headache, or stiff neck    Trouble breathing, wheezing, or pain with breathing  Date Last Reviewed: 9/13/2015 2000-2017 [employer]. [address] [place] [address]. All rights reserved. This information is not intended as a substitute for professional medical care. Always follow your healthcare professional's instructions.

## 2021-06-28 NOTE — PROGRESS NOTES
"Progress Notes by Hema Bae MD at 9/30/2019  9:50 AM     Author: Hema Bae MD Service: -- Author Type: Physician    Filed: 9/30/2019 10:30 AM Encounter Date: 9/30/2019 Status: Signed    : Hema Bae MD (Physician)           Click to link to [facility] HEART CARE NOTE    Thank you,  Provider, No Primary Care, for asking the [facility] Heart Care team to see Ms. Paulette Singer to evaluate Palpitations.      Assessment/Recommendations   Assessment:    1. Palpitations, likely due to PVCs - provided reassurance that these are likely benign. Discussed triggers and causes. Will quantify with a holter monitor.    Plan:  1. holter monitor.  2. Advised magnesium supplementation (diet or supplement)  3. Follow up as needed based on holter results or worsening symptoms.           History of Present Illness   Ms. Paulette Singer is a 34 y.o. generally healthy female who presents for evaluation of palpitations.    Mrs. Singer has noted palpitations for the past couple weeks.  They initially lasted for a couple seconds at a time and were very symptomatic.  Duration was up to 2 hours at a time.  Associated with \"takes my breath away\" and some mild lightheadedness.  Does not endorse that these are sustained rapid heartbeats rather \"hiccups\".  She had 2 ER visits within the span of 2 days for the symptoms.  While in the ER and on monitor she was identified to have her symptoms correlate with PVCs.  Now she is having symptoms and feeling these beats numerous times throughout the day but they are less severe than they were at onset in intensity.  She has noted that these occur more frequently when she is stressed or has not had much sleep.  Also worse with coffee.      Other than noted above, Ms. Singer denies any chest pain/pressure/tightness, shortness of breath at rest or with exertion, light headedness/dizziness, pre-syncope, syncope, lower extremity swelling, " palpitations, paroxysmal nocturnal dyspnea (PND), or orthopnea.     Cardiac Problems and Cardiac Diagnostics     Most Recent Cardiac testing:  ECG dated 9/22/19 (personaly reviewed and interpreted): sinus rhythm with sinus arrhythmia. Normal ECG. (all three ECGs from 9/20-9/22 have the same findings).       Medications  Allergies   Current Outpatient Medications   Medication Sig Dispense Refill   ? albuterol (PROAIR HFA;PROVENTIL HFA;VENTOLIN HFA) 90 mcg/actuation inhaler Inhale 2 puffs every 6 (six) hours as needed for wheezing. Use with spacer. 1 each 0   ? azithromycin (ZITHROMAX Z-MABLE) 250 MG tablet Take 2 tablets (500 mg) on  Day 1,  followed by 1 tablet (250 mg) once daily on Days 2 through 5. 6 tablet 0   ? ibuprofen (ADVIL,MOTRIN) 200 MG tablet Take 600 mg by mouth every 6 (six) hours as needed for pain.       No current facility-administered medications for this visit.       Allergies   Allergen Reactions   ? Amoxicillin Rash   ? Penicillins Rash     Has used other penicillin without reaction          Physical Examination Review of Systems   Vitals:    09/30/19 0952   BP: 120/80   Pulse: 64   Resp: 16     Body mass index is 26.78 kg/m .  Wt Readings from Last 3 Encounters:   09/30/19 171 lb (77.6 kg)   09/22/19 174 lb (78.9 kg)   05/19/19 172 lb (78 kg)       General Appearance:   Pleasant female, appears stated age. no acute distress, healthy body habitus   ENT/Mouth: membranes moist, no apparent gingival bleeding.      EYES:  no scleral icterus, normal conjunctivae   Neck: no carotid bruits. No anterior cervical lymphadenopaty   Respiratory:   lungs are clear to auscultation, no rales or wheezing, equal chest wall expansion    Cardiovascular:   Regular rhythm, normal rate. Normal first and second heart sounds with no murmurs, rubs, or gallops; the carotid, radial and posterior tibial pulses are intact, Jugular venous pressure normal, no edema bilaterally    Abdomen/GI:  no organomegaly, masses, bruits, or  tenderness; bowel sounds are present   Extremities: no cyanosis or clubbing   Skin: no xanthelasma, warm.    Heme/lymph/ Immunology No apparent bleeding noted.   Neurologic: Alert and oriented. normal gait, no tremors     Psychiatric: Pleasant, calm, appropriate affect.    A complete 10 system review of systems was performed and is negative except as mentioned in the HPI or below:  General: WNL  Eyes: WNL  Ears/Nose/Throat: WNL  Lungs: WNL  Heart: Shortness of Breath with activity, Irregular Heartbeat  Stomach: WNL  Bladder: WNL  Muscle/Joints: Muscle Weakness  Skin: WNL  Nervous System: Daytime Sleepiness  Mental Health: Anxiety     Blood: WNL       Past History   Past Medical History:   Past Medical History:   Diagnosis Date   ? 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 Hyperthyroidism in pregnancy, antepartum        Past Surgical History:   Past Surgical History:   Procedure Laterality Date   ? HYSTERECTOMY         Family History: History reviewed. No pertinent family history. denied family history of heart problems, especially heart rhythm problems.    Social History:   Social History     Socioeconomic History   ? Marital status:      Spouse name: Not on file   ? Number of children: Not on file   ? Years of education: Not on file   ? Highest education level: Not on file   Occupational History   ? Not on file   Social Needs   ? Financial resource strain: Not on file   ? Food insecurity:     Worry: Not on file     Inability: Not on file   ? Transportation needs:     Medical: Not on file     Non-medical: Not on file   Tobacco Use   ? Smoking status: Never Smoker   ? Smokeless tobacco: Never Used   Substance and Sexual Activity   ? Alcohol use: Not on file   ? Drug use: Not on file   ? Sexual activity: Not on file   Lifestyle   ? Physical activity:     Days per week: Not on file     Minutes per session: Not on file   ? Stress: Not on file   Relationships   ? Social connections:     Talks on phone: Not on file     Gets  together: Not on file     Attends Sabianist service: Not on file     Active member of club or organization: Not on file     Attends meetings of clubs or organizations: Not on file     Relationship status: Not on file   ? Intimate partner violence:     Fear of current or ex partner: Not on file     Emotionally abused: Not on file     Physically abused: Not on file     Forced sexual activity: Not on file   Other Topics Concern   ? Not on file   Social History Narrative   ? Not on file    , stays at home. Has three boys ages 2, 4, 6.          Lab Results    Chemistry/lipid CBC Cardiac Enzymes/BNP/TSH/INR   Lab Results   Component Value Date    CREATININE 0.82 09/22/2019    BUN 16 09/22/2019    K 3.9 09/22/2019     09/22/2019     09/22/2019    CO2 29 09/22/2019    Lab Results   Component Value Date    WBC 6.9 09/22/2019    HGB 15.3 09/22/2019    HCT 45.4 09/22/2019    MCV 88 09/22/2019     09/22/2019    Lab Results   Component Value Date    TSH 0.71 09/22/2019          Hema Bae MD  Non-invasive Cardiology  [facility]

## 2021-06-29 NOTE — PROGRESS NOTES
Progress Notes by Ed Magaña PA-C at 2020  5:20 PM     Author: Ed Magaña PA-C Service: -- Author Type: Physician Assistant    Filed: 2020  2:53 PM Encounter Date: 2020 Status: Signed    : Ed Magaña PA-C (Physician Assistant)       Subjective:      Patient ID: Paulette Singer is a 35 y.o. female.    Chief Complaint:    HPI  Paulette Singer is a 35 y.o. female who presents today complaining of two complaints.  Patient stating that she had some swelling in her bilateral armpits that started over the last three to four days.  Left is much worse than the right.  She noticed that there is a large red painful lump in the left axilla.  There is a small area that is painful to palpation and there are 3 other small swellings on the left axilla.  Patient also had a second complaint that she feels that she has some warmth on the breastbone and breast tenderness in her left and right breast that started after the swelling in the axillae.  She did not have any skin changes, puckering, drainage from the nipple or overt breast pain or swelling.  She has had no constitutional symptoms to include fever chills night sweats or fatigue or weight loss.    She periodically does breast self exams two to three times per year and has not had noted breast lumps.      Past Medical History:   Diagnosis Date   ? Chronic kidney disease    ? 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 Hiatal hernia     as a    ? Hyperthyroidism in pregnancy, antepartum    ? PONV (postoperative nausea and vomiting)        Past Surgical History:   Procedure Laterality Date   ? HYSTERECTOMY     ? MS LAP,RMV  ADNEXAL STRUCTURE Left 2020    Procedure: LAPAROSCOPIC EXCISION OF ENDOMETREOSIS, REPAIR VAGINAL CUFF;  Surgeon: Jim Ramirez MD;  Location: [facility];  Service: Gynecology       No family history on file.    Social History     Tobacco Use   ? Smoking status: Never Smoker   ? Smokeless tobacco: Never Used   Substance Use  Topics   ? Alcohol use: Yes   ? Drug use: Never       Review of Systems  As above in HPI, otherwise balance of Review of Systems are negative.    Objective:     /80 (Patient Site: Right Arm, Patient Position: Sitting, Cuff Size: Adult Regular)   Pulse 80   Temp 98.9  F (37.2  C) (Oral)   Resp 20   Wt 160 lb (72.6 kg)   SpO2 98%   BMI 25.06 kg/m      Physical Exam  General: Patient is resting comfortably no acute distress is afebrile  HEENT: Head is normocephalic atraumatic   eyes are PERRL EOMI sclera anicteric   Skin: Without rash non-diaphoretic  Breast: Examination of left axilla shows that there is a 1 cm area of firm MOBILE erythematous mass appears to be consistent with sebaceous cyst.  Also on the left axilla there are 3 smaller 1/2 cm areas of swelling.  These appear to be superficial on the skin.  They are not deeper.  I do not believe these are lymph nodes.  Inspection of the manubrium of the chest does not show any erythema or warmth to touch.  Skin appears to be intact and there is no changes noted on the left or right breast.  Breast examination was performed in the office.  There was fibrocystic ages noted on the left and the right there is a larger bump and cystic changes on the left than the right.  The examination was well-tolerated nonpainful for the patient.  No noted right axillary lymphadenopathy.  All of the above examination was done with supervised chaperoned exam with our clinical assistant Jessie.      Assessment:     Procedures    The encounter diagnosis was Sebaceous cyst of left axilla.    Plan:     1. Sebaceous cyst of left axilla  cefdinir (OMNICEF) 300 MG capsule       MDM:   I had a long conversation with the patient stating that is possible that the symptoms were including lymphadenopathy with possible problems with the breast.  It is unusual to have sebaceous cyst in the axilla and have associated breast tenderness.  She does not have a possibility of pregnancy since she  no longer has a uterus.  Additionally, she did not know that she had a history of fibrocystic breast changes.  I do think her breast exam was consistent with fibrocystic breast changes.  She did not have any pain or tenderness with examination.  Patient will be treated for an infected sebaceous cyst.  This will help with the lymphadenopathy in the left axilla.  Hot packs to the area 3 times per day.  They were not amenable to I&D at this time since they were not fluctuant.  Size was also very small.  Patient will follow-up on Monday with her female care provider to see how she is improving with her course of treatment and to have a conversation about imaging with fibrocystic breast disease and breast examination.     Patient Instructions     Hot packs to the area 3 times per day  Take antibiotic as written.  Follow-up with your female care provider next week for breast exam and to ensure that the hot packs and antibiotics are resolving the cyst on the left axilla.      Patient Education     Epidermoid Cyst (Sebaceous Cyst), Infected (Antibiotic Treatment)  You have an epidermoid cyst. This is a small, painless lump under your skin. An epidermoid cyst (often called a sebaceous cyst, epidermal cyst, or epidermal inclusion cyst) is a term most often used for 2 similar types of cysts:    Epidermoid cysts. These cysts form slowly under the skin. They can be found on most parts of the body. But they are most often found on areas with more hair such as the scalp, face, upper back, and genitals.    Pilar cysts. These are similar to epidermoid cysts. But they start from a different part of the hair follicle. They are more likely to be on the scalp.  Here are some general facts about these cysts:    A cyst is a sac filled with material that is often cheesy, fatty, oily, or stringy. The material inside can be thick. Or it can be a liquid.    You can usually move the cyst slightly if you try.    The cysts can be smaller than a  pea or as large as a few inches.    The cysts are usually not painful, unless they become inflamed or infected.    The area around the cyst may smell bad. If the cyst breaks open, the material inside it often smells bad as well.  Your cyst became infected and your healthcare provider wanted to treat it with antibiotics. If the antibiotics dont clear up the infection, the cyst will need to be drained by making a small cut (incision). Local anesthesia will be used to numb the area.  Home care    Resist the temptation to squeeze or pop the cyst, stick a needle in it, or cut it open. This often leads to a worsening infection and scarring.    Take the antibiotic as directed until it is all used up.    Soak the affected area in hot water or apply a hot pack (a thin, clean towel soaked in hot water) for 20 minutes at a time. Do this 3 to 4 times a day.    Apply antibiotic cream or ointment 3 times a day.    You may use over-the-counter pain medicine to control pain, unless another medicine was given. If you have chronic liver or kidney disease or ever had a stomach ulcer or GI bleeding, talk with your healthcare provider before using these medicines.  Prevention  Once this infection has healed, reduce the risk of future infections by:    Keeping the cyst area clean by bathing or showering daily    Avoiding tight-fitting clothing in the cyst area  Follow-up care  Follow up with your healthcare provider, or as advised. If a gauze packing was put in your wound, it should be removed in a few days as advised by your healthcare provider. Check your wound every day for the signs listed below.  When to seek medical advice  Call your healthcare provider right away if any of these occur:    Pus coming from the cyst    Increasing redness around the wound    Increasing local pain or swelling    Fever of 100.4 F (38 C) or higher, or as directed by your provider  Date Last Reviewed: 10/5/2016    6682-9785 [employer] [address]  [place] [address]. All rights reserved. This information is not intended as a substitute for professional medical care. Always follow your healthcare professional's instructions.           Patient Education     Fibrocystic Breast Changes (Presumed)  One or more lumps were found in your breasts. These are likely fibrocystic breast changes.   With this condition, fibrous tissue and small cysts form in your breasts. They may increase and decrease in size with your menstrual cycle. Symptoms can include breast lumps that you can see and feel. You may also have breast pain, swelling, and tenderness.  The lumps associated with fibrocystic breast changes are not cancer, and do not lead to cancer. They are very common, and affect most women at some point in their lives.  Treatment for fibrocystic breast changes is rarely needed. Home care to relieve symptoms may be recommended, though.  If confirmation of the diagnosis is desired or needed, further evaluation may be done. This can include:    Another exam by your healthcare provider or a gynecologist    Imaging tests, such as a mammogram or ultrasound    Biopsy (procedure to remove small tissue samples from the breast lumps)  Youll be told more about these tests, if needed.  Home care    If you are having breast pain:  ? Take an over-the-counter pain reliever, if directed to by your provider.  ? Wear a well-fitted bra or sports bra for extra support. If you have breast pain at night, try wearing the bra during sleep.  ? Apply a warm compress (towel soaked in warm water) to the breast. You may also use a hot water bottle.    Check your breasts each day. Keep a log of whether the lump seems to be changing in size or tenderness with your period. This can help your healthcare provider learn more about your breast condition.    Ask your healthcare provider about lifestyle or diet changes or alternative treatments you can try to help treat this  condition.  Follow-up care  Follow with your healthcare provider, or as directed. You may be advised to schedule another appointment for a breast exam with your provider or a gynecologist about 7 to 10 days after the start of your next period. If fibrocystic breast changes begin to cause symptoms that bother you, tell your provider. He or she may recommend other treatments.  When to seek medical advice  Call your healthcare provider right away if any of these occur:    Fever of 100.4 F (38 C) or higher    Redness or swelling of the breasts    Discharge from the nipples    Visible changes in the skin over the nipples or breasts    Lumps grow larger, feel very hard, or have an irregular shape    New lumps form that look or feel different from current lumps  Date Last Reviewed: 3/1/2017    1328-2281 [employer]. [address] [place] [address]. All rights reserved. This information is not intended as a substitute for professional medical care. Always follow your healthcare professional's instructions.           Patient Education     What Are Fibrocystic Breasts?    Do your breasts ever feel lumpy, sore, or tender? If so, you may have fibrocystic breasts. This is a very common condition. It is not a disease, and it is not cancer. Your healthcare provider can rule out problems and help you ease your symptoms.  Normal breasts  Your breasts are made up of 3 kinds of tissue:    Glandular tissue is made up of the milk ducts and glands.    Fibrous tissue supports the breast.    Fatty tissue fills the spaces between the other tissues. It gives the breast its size.  Fibrocystic breasts  Hormones are chemicals that control your menstrual cycle. Hormones can also make glandular tissue swell. This stretches fibrous tissue, making your breasts feel sore. Hormones may also cause one or more fluid-filled lumps to form. These lumps are called cysts. They may be large or small. Cysts are not cancer. This means  they are benign. Just before your period, cysts may become larger. Your breasts may feel more sore.  What you may feel  Changes in hormone levels during your menstrual cycle affect your breasts. If you have fibrocystic breasts, your breasts may become sore or even painful. This can often happen before your period. Your breasts may also swell or feel lumpier at this time.  Caring for your breasts  Breast self-awareness is the key to caring for your breasts. Self-awareness means knowing how your breasts normally look and feel. This helps you notice any changes right away. Call your provider if you notice new lumps or changes that feel different than normal. See your provider for regular exams. And have a mammogram as often as your provider suggests.   Date Last Reviewed: 9/1/2017 2000-2019 [employer]. [address] [place] [address]. All rights reserved. This information is not intended as a substitute for professional medical care. Always follow your healthcare professional's instructions.           Patient Education     Breast Lump, Uncertain Cause    A lump was found in your breast. Most breast lumps are not cancer. They may be caused by normal changes in the breast tissue due to hormone variations that occur with your menstrual cycle. Some women may form lumps that are painful and tender. Others may form lumps that are painless.  At this time, is not possible to be certain of the cause of your lump without further evaluation. This could include:    Another exam by your healthcare provider or a gynecologist    Imaging tests, such as a mammogram or ultrasound    Biopsy (procedure to remove small tissue samples from the breast lump)  Your healthcare provider will explain any additional testing that is needed. Be sure to get answers to any questions you may have.  Home care  Until a diagnosis is made, you may be advised to do the following:    If you are having breast pain:  ? Take an  over-the-counter pain reliever, if directed to by your provider.  ? Wear a well-fitted bra or sports bra for extra support. If you have breast pain at night, try wearing the bra during sleep.  ? Apply a warm compress (towel soaked in warm water) to the breast. You may also use a hot water bottle.    Check your breasts each day. Keep a log of whether the lump seems to be changing in size or tenderness with your period. This can help your healthcare provider make the correct diagnosis.  Follow-up care  Follow up with your healthcare provider, or as directed. Keep all appointments. Also, prepare for any upcoming tests as directed.  When to seek medical advice  Call your healthcare provider right away if any of these occur:    Fever of 100.4 F (38 C) or higher    Redness or swelling of the breast    Discharge from the nipple    Visible changes in the skin over the nipple or breast    Lump grows larger, feels very hard, or has an irregular shape    New lumps form  Date Last Reviewed: 3/1/2017    6596-4509 [employer]. [address] [place] [address], [place] [address]. All rights reserved. This information is not intended as a substitute for professional medical care. Always follow your healthcare professional's instructions.

## 2021-07-19 ENCOUNTER — HOSPITAL ENCOUNTER (OUTPATIENT)
Dept: NUCLEAR MEDICINE | Facility: CLINIC | Age: 36
Setting detail: NUCLEAR MEDICINE
Discharge: HOME | End: 2021-07-19
Attending: UROLOGY | Admitting: UROLOGY
Payer: COMMERCIAL

## 2021-07-19 DIAGNOSIS — Q62.5 DUPLICATED RIGHT RENAL COLLECTING SYSTEM: ICD-10-CM

## 2021-07-19 PROCEDURE — 343N000001 HC RX 343: Performed by: UROLOGY

## 2021-07-19 PROCEDURE — 250N000011 HC RX IP 250 OP 636: Performed by: UROLOGY

## 2021-07-19 PROCEDURE — 78708 K FLOW/FUNCT IMAGE W/DRUG: CPT

## 2021-07-19 PROCEDURE — A9562 TC99M MERTIATIDE: HCPCS | Performed by: UROLOGY

## 2021-07-19 RX ORDER — FUROSEMIDE 10 MG/ML
40 INJECTION INTRAMUSCULAR; INTRAVENOUS ONCE
Status: COMPLETED | OUTPATIENT
Start: 2021-07-19 | End: 2021-07-19

## 2021-07-19 RX ADMIN — TECHNESCAN TC 99M MERTIATIDE 8.9 MILLICURIE: 1 INJECTION, POWDER, LYOPHILIZED, FOR SOLUTION INTRAVENOUS at 15:38

## 2021-07-19 RX ADMIN — FUROSEMIDE 40 MG: 10 INJECTION, SOLUTION INTRAVENOUS at 15:37

## 2021-07-29 ENCOUNTER — VIRTUAL VISIT (OUTPATIENT)
Dept: UROLOGY | Facility: CLINIC | Age: 36
End: 2021-07-29
Payer: COMMERCIAL

## 2021-07-29 DIAGNOSIS — Q62.5 DUPLICATED RIGHT RENAL COLLECTING SYSTEM: Primary | ICD-10-CM

## 2021-07-29 PROCEDURE — 99213 OFFICE O/P EST LOW 20 MIN: CPT | Mod: 95 | Performed by: UROLOGY

## 2021-07-29 NOTE — PROGRESS NOTES
"[facility]   CHIEF COMPLAINT   It was my pleasure to see Paulette Singer who is a 36 year old female for follow-up of LEFT ureteroureterostomy.      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  She is status post a robotic ureteroureterostomy on 4/12/2021.  During this surgery she had placement of a 5 Kosovan by 26 cm ureteral stent into each of the upper and lower pole moieties. She then underwent cystoscopy, Retrograde Pyelogram and ureteral stent removal on 5/10/21.     6/10/21:  Doing very well since the stents removed   She notes a near complete resolution of her prior left flank and groin pain as well as her pelvic floor dysfunction.  She notes now she will only have a very infrequent left abdominal \"twinge\"which seems possibly related to either her menstrual cycle or bowel movements.  She is extremely happy with the outcome from surgery and very grateful    TODAY 7/29/21:  Continues to have infrequent twinge  She has never been so active as this summer and is overall feeling great    PHYSICAL EXAM  Patient is a 36 year old  female   Vitals: Last menstrual period 07/30/2018, not currently breastfeeding.  There is no height or weight on file to calculate BMI.  General Appearance Adult:   Alert, no acute distress, oriented  HENT: throat/mouth:normal, good dentition  Lungs: no respiratory distress, or pursed lip breathing  Heart: No obvious jugular venous distension present  Abdomen: non - distended  Musculoskeltal: extremities normal, no peripheral edema  Skin: no suspicious lesions or rashes  Neuro: Alert, oriented, speech and mentation normal  Psych: affect and mood normal  Gait: Normal     UA RESULTS:  Recent Labs   Lab Test 08/06/18  1147 03/17/17  1545 03/17/17  1545   COLOR Straw   < > Yellow "   APPEARANCE Clear   < > Clear   URINEGLC Negative   < > Negative   URINEBILI Negative   < > Negative   URINEKETONE Negative   < > Negative   SG 1.005   < > 1.010   UBLD Negative   < > Moderate*   URINEPH 7.0   < > 5.5   PROTEIN Negative   < > Negative   UROBILINOGEN  --   --  0.2   NITRITE Negative   < > Negative   LEUKEST Negative   < > Small*   RBCU <1   < > 2-5*   WBCU 1   < > 5-10*    < > = values in this interval not displayed.      Creatinine   Date Value Ref Range Status   04/13/2021 0.84 0.52 - 1.04 mg/dL Final     IMAGING:  All pertinent imaging reviewed:    All imaging studies reviewed by me.  I reviewed her intraoperative fluoroscopy from 5/10/2021    NM RENAL 7/19/21:  FINDINGS: Differential renal function of 47% on the left and 53% on the right. Prompt clearance of radiotracer from the duplicated left renal collecting system (Lasix T1/2: 17 minutes) and duplicated right renal collecting system (Lasix T1/2: 14 minutes)   without evidence of obstruction.                                                             IMPRESSION:      Duplicated renal collecting systems without evidence of obstruction.    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  She is now status post a robotic ureteroureterostomy on 4/12/2021 followed by cystoscopy, left retrograde pyelogram, and left ureteral stent removal on 5/10/2021.    -She is doing extremely well after surgery with complete resolution of her prior left flank and groin pain  -She is very happy with the outcome from surgery and very thankful  -We reviewed her nuclear medicine renal scan which is very reassuring with normal split function and no evidence of obstruction.  -We discussed that at this point she may follow-up with me on a as needed basis      Time  spent: 20 minutes spent on the date of the encounter doing chart review, history and exam, documentation and further activities as noted above.    Brendan Hanson MD   Urology  [facility]  [facility] Phone: [phone]  [facility] Phone: [phone]      Paulette Singer  who is being evaluated via a billable video visit.      How would you like to obtain your AVS? MyChart  If the video visit is dropped, the invitation should be resent by: Text to cell phone: [phone]  Will anyone else be joining your video visit? No    Video-Visit Details    Type of service:  Video Visit    Video Start Time: 3:05 PM    Video End Time:3:15 PM    Originating Location (pt. Location): Home    Distant Location (provider location):  [facility]     Platform used for Video Visit: [employer]

## 2021-09-18 ENCOUNTER — HEALTH MAINTENANCE LETTER (OUTPATIENT)
Age: 36
End: 2021-09-18

## 2022-01-08 ENCOUNTER — HEALTH MAINTENANCE LETTER (OUTPATIENT)
Age: 37
End: 2022-01-08

## 2022-04-04 ENCOUNTER — MYC MEDICAL ADVICE (OUTPATIENT)
Dept: UROLOGY | Facility: CLINIC | Age: 37
End: 2022-04-04
Payer: COMMERCIAL

## 2022-04-05 NOTE — CONFIDENTIAL NOTE
Brendan Hanson MD  [facility] [place] 9 hours ago (10:18 PM)     [facility] team,     Yeah, I don't think his would be related to the ureters at all. So, I'd recommend follow up with her PCP and/or OB/GYN.     Thanks,   Brendan Hanson M.D.      Received the above message from Dr. Hanson. My chart message sent to patient.    Sandy Nunez RN, BSN

## 2022-11-19 ENCOUNTER — HEALTH MAINTENANCE LETTER (OUTPATIENT)
Age: 37
End: 2022-11-19

## 2023-01-07 ENCOUNTER — HOSPITAL ENCOUNTER (EMERGENCY)
Facility: CLINIC | Age: 38
Discharge: HOME | End: 2023-01-07
Attending: FAMILY MEDICINE | Admitting: FAMILY MEDICINE
Payer: COMMERCIAL

## 2023-01-07 ENCOUNTER — APPOINTMENT (OUTPATIENT)
Dept: CT IMAGING | Facility: CLINIC | Age: 38
End: 2023-01-07
Attending: FAMILY MEDICINE
Payer: COMMERCIAL

## 2023-01-07 VITALS
OXYGEN SATURATION: 97 % | HEART RATE: 72 BPM | DIASTOLIC BLOOD PRESSURE: 75 MMHG | SYSTOLIC BLOOD PRESSURE: 117 MMHG | BODY MASS INDEX: 24.48 KG/M2 | RESPIRATION RATE: 16 BRPM | TEMPERATURE: 97.8 F | HEIGHT: 67 IN | WEIGHT: 156 LBS

## 2023-01-07 DIAGNOSIS — S06.0X0A CONCUSSION WITHOUT LOSS OF CONSCIOUSNESS, INITIAL ENCOUNTER: ICD-10-CM

## 2023-01-07 PROCEDURE — 72125 CT NECK SPINE W/O DYE: CPT

## 2023-01-07 PROCEDURE — 70450 CT HEAD/BRAIN W/O DYE: CPT

## 2023-01-07 PROCEDURE — 99284 EMERGENCY DEPT VISIT MOD MDM: CPT | Mod: 25

## 2023-01-07 ASSESSMENT — ENCOUNTER SYMPTOMS
CONFUSION: 1
NECK PAIN: 1
NAUSEA: 0
NECK STIFFNESS: 1
VOMITING: 0

## 2023-01-07 ASSESSMENT — ACTIVITIES OF DAILY LIVING (ADL): ADLS_ACUITY_SCORE: 35

## 2023-01-08 NOTE — ED TRIAGE NOTES
Pt was sledding and hit her head around 1400 today. Pt felt okay initially, but has had increasing confusion and feeling forgetful this evening. States she made a couple of wrong turns and would forget where she was or why she was there.      Triage Assessment     Row Name 01/07/23 2024       Triage Assessment (Adult)    Airway WDL WDL       Respiratory WDL    Respiratory WDL WDL       Skin Circulation/Temperature WDL    Skin Circulation/Temperature WDL WDL       Cardiac WDL    Cardiac WDL WDL       Peripheral/Neurovascular WDL    Peripheral Neurovascular WDL WDL       Cognitive/Neuro/Behavioral WDL    Cognitive/Neuro/Behavioral WDL WDL

## 2023-01-08 NOTE — ED PROVIDER NOTES
EMERGENCY DEPARTMENT ENCOUNTER      NAME: Paulette Singer  AGE: 37 year old female  YOB: 1985  MRN: [id number]  EVALUATION DATE & TIME: 1/7/2023  8:26 PM    PCP: No Ref-Primary, Physician    ED PROVIDER: Montana Frost M.D.    Chief Complaint   Patient presents with     Head Injury       FINAL IMPRESSION:  1. Concussion without loss of consciousness, initial encounter        ED COURSE & MEDICAL DECISION MAKING:    Pertinent Labs & Imaging studies independently interpreted by me. (See chart for details)    8:28 PM I met with the patient, obtained history, performed an initial exam, and discussed options and plan for diagnostics and treatment here in the ED. Patient seen and examined, external records reviewed.  Differential diagnosis includes but not limited to intracranial hemorrhage, skull fracture, contusion, scalp laceration, concussion, facial fracture, nasal fracture, mandible fracture, dental fracture.  Patient presents with head injury and subsequently slowly developing confusion and lightheadedness.  On exam here, no focal neurologic deficits.  CT scan of the head is ordered.  Also complaining of some pain in the shoulders and neck, CT scan of the cervical spine is ordered as well.  Considered further imaging with CT of the thoracic and lumbar spines but no pain or midline tenderness in these areas, no other injuries.  9:30 PM CT scan of the head and cervical spine negative.  Patient updated with diagnosis of concussion and plan for outpatient follow-up.  Discussed sequelae of this injury and possible timeline of weeks to months for symptom resolution.  Referral to concussion clinic.      At the conclusion of the encounter I discussed the results of all of the tests and the disposition. The questions were answered. The patient or family acknowledged understanding and was agreeable with the care plan.     Medical Decision Making    History:    Supplemental history from: N/A    External  Record(s) reviewed: Documented in HPI, if applicable.    Work Up:    Chart documentation includes differential considered and any EKGs or imaging independently interpreted by provider.    In additional to work up documented, I considered the following work up: See chart documentation, if applicable.    External consultation:    Discussion of management with another provider: See chart documentation, if applicable    Complicating factors:    Care impacted by chronic illness: N/A    Care affected by social determinants of health: N/A    Disposition considerations: Discharge. No recommendations on prescription strength medication(s). I considered admission, but ultimately discharged patient .        PROCEDURES:       MEDICATIONS GIVEN IN THE EMERGENCY:  Medications - No data to display    NEW PRESCRIPTIONS STARTED AT TODAY'S ER VISIT  New Prescriptions    No medications on file       =================================================================    Hospitals in [place]    Patient information was obtained from: Patient       Paulette Singer is a 37 year old female with a pertinent history of CKD s/p cystoscopy, retrogrades, ureteroscopy combined with stent placement, hysterectomy who presents to this ED by walking for evaluation of head injury.     Today at 2 or 3 PM, patient was sledding with her kid and when hit her head. She denied any loss of consciousness and states she felt fine afterwards. Patient took ibuprofen when she got home. Throughout the evening, patient states she has been feeling more disoriented and confused. For example, when patient went to for; to (do) Centers, she did not remember the drive there or the reason why she went. When she went tanning at a spot she was familiar with, she was unable to remember the hours it was opened and took multiple wrong turns getting there. Patient then decided to go home to rest and while she was making dinner, patient states she made 30 continuous trips to the refrigerator but constantly  forgot what she needed to grab or what she was even making.     At present, patient endorses lightheadedness, neck pain, and neck stiffness. She additionally endorsed slight pain in the area where she got injured (top of right head). Patient denies nausea, vomiting, or any other complaints at this time.     REVIEW OF SYSTEMS   Review of Systems   Gastrointestinal: Negative for nausea and vomiting.   Musculoskeletal: Positive for neck pain and neck stiffness.        Positive for pain around right occipital parietal region.    Neurological:        Denies loss of consciousness after injury.    Psychiatric/Behavioral: Positive for confusion.      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      Headache 2007     Hernia, abdominal      Hiatal hernia     as a      Hyperthyroidism in pregnancy, antepartum      Low ferritin 2016    3rd pregnancy     Migraines age 17    migraine/sinus headaches, better with excedrin-as child     Mixed stress and urge urinary incontinence 2018     Palpitations      Plantar fasciitis 2009    See podiatry consult     PONV (postoperative nausea and vomiting)      PONV (postoperative nausea and vomiting)      Uterine prolapse 2018       PAST SURGICAL HISTORY:  Past Surgical History:   Procedure Laterality Date     COMBINED CYSTOSCOPY, RETROGRADES, URETEROSCOPY, INSERT STENT Left 5/10/2021    Procedure: CYSTOSCOPY, WITH LEFT RETROGRADE PYELOGRAM AND URETERAL STENT REMOVAL;  Surgeon: Brendan Hanson MD;  Location:  OR     CYSTOSCOPY N/A 2018    Procedure: CYSTOSCOPY;;  Surgeon: Ed Noriega MD;  Location:  OR     CYSTOSCOPY, INJECT VESICOURETERAL REFLUX GEL N/A 2017    Procedure: CYSTOSCOPY, INJECT VESICOURETERAL REFLUX GEL;  CYSTOSCOPY WITH  SUBMUCOSAL INJECTION OF DEFLUX ;  Surgeon: Jeferson White MD;  Location:  OR     CYSTOSCOPY, RETROGRADES, COMBINED Bilateral 4/19/2017    Procedure: COMBINED CYSTOSCOPY, RETROGRADES;  CYSTOSCOPY, LEFT URETEROSCOPY, LEFT STENT PLACEMENT & RETRIEVAL, BILATERAL RETROGRADES;  Surgeon: Jeferson White MD;  Location:  OR     CYSTOSCOPY, URETEROSCOPY, COMBINED Left 4/19/2017    Procedure: COMBINED CYSTOSCOPY, URETEROSCOPY;;  Surgeon: Jeferson White MD;  Location:  OR     DAVINCI PELVIC PROCEDURE Left 4/12/2021    Procedure: ROBOTIC ASSISTED LEFT URETEROURETEROSTOMY, CYSTOSCOPY, LEFT URETERAL STENT PLACEMENT;  Surgeon: Brendan Hanson MD;  Location:  OR     HERNIA REPAIR, INGUINAL RT/LT      bilateral as a baby     HYSTERECTOMY       HYSTERECTOMY VAGINAL N/A 7/30/2018    Procedure: HYSTERECTOMY VAGINAL;  Total Vaginal Hysterectomy, Bilateral salpingectomy;  Surgeon: Ed Noriega MD;  Location:  OR     MOUTH SURGERY      [place] teeth     PELVIS LAPAROSCOPY,DX  6/2009    normal     AL LAP,RMV  ADNEXAL STRUCTURE Left 5/21/2020    Procedure: LAPAROSCOPIC EXCISION OF ENDOMETREOSIS, REPAIR VAGINAL CUFF;  Surgeon: Jim Ramirez MD;  Location: [facility];  Service: Gynecology     [facility] UGI ENDOSCOPY, SIMPLE EXAM  09/11/2007       CURRENT MEDICATIONS:    No current facility-administered medications for this encounter.     No current outpatient medications on file.       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 No      Service No     Blood Transfusions No     Caffeine Concern No     Occupational Exposure Yes     Comment: Holistic healing     Hobby Hazards No     Sleep Concern No     Stress Concern No     Weight Concern No     Special Diet No     Back Care No     Exercise Yes     Seat Belt Yes   Social History Narrative    Lives in [place] with , Timur and their two sons.  No concerns about domestic violence.  No smokers in the home.  No indoor cats/kittens.       VITALS:  BP (!) 148/81   Pulse 74   Temp 97.8  F (36.6  C) (Temporal)   Resp 16   Ht 1.702 m (5' 7\")   Wt 70.8 kg (156 lb)   LMP 07/30/2018   SpO2 99%   BMI 24.43 kg/m      PHYSICAL EXAM:  Physical Exam  Vitals and nursing note reviewed.   Constitutional:       Appearance: Normal appearance.   HENT:      Head: Normocephalic.      Comments: Mild right occipital parietal region tenderness.      Right Ear: External ear normal.      Left Ear: External ear normal.      Nose: Nose normal.   Eyes:      Extraocular Movements: Extraocular movements intact.      Conjunctiva/sclera: Conjunctivae normal.   Pulmonary:      Effort: Pulmonary effort is normal.   Musculoskeletal:         General: Normal range of motion.      Cervical back: Normal range of motion.      Right lower leg: No edema.      Left lower leg: No edema.   Skin:     General: Skin is warm and dry.   Neurological:      General: No focal deficit present.      Mental Status: She is alert and oriented to person, place, and time. Mental status is at baseline.   Psychiatric:         Mood and Affect: Mood normal.         Behavior: " Behavior normal.         Thought Content: Thought content normal.          LAB:  All pertinent labs reviewed and interpreted.  Results for orders placed or performed during the hospital encounter of 01/07/23   Head CT w/o contrast    Impression    IMPRESSION:  1.  Normal head CT.   Cervical spine CT w/o contrast    Impression    IMPRESSION:  1.  No fracture or subluxation of the cervical spine.         RADIOLOGY:  Reviewed all pertinent imaging. Please see official radiology report.  Cervical spine CT w/o contrast   Final Result   IMPRESSION:   1.  No fracture or subluxation of the cervical spine.         Head CT w/o contrast   Final Result   IMPRESSION:   1.  Normal head CT.          EKG:    None    I, Eva Simmons, am serving as a scribe to document services personally performed by Dr. Frost based on my observation and the provider's statements to me. I, Montana Frost MD attest that Eva Simmons is acting in a scribe capacity, has observed my performance of the services and has documented them in accordance with my direction.    Montana Frost M.D.  Emergency Medicine  [facility] EMERGENCY ROOM  [address] [facility] [id number] [phone]  Dept: [phone]     Montana Frost MD  01/07/23 [id number]

## 2023-04-09 ENCOUNTER — HEALTH MAINTENANCE LETTER (OUTPATIENT)
Age: 38
End: 2023-04-09

## 2023-07-17 ENCOUNTER — TRANSFERRED RECORDS (OUTPATIENT)
Dept: HEALTH INFORMATION MANAGEMENT | Facility: CLINIC | Age: 38
End: 2023-07-17

## 2023-11-21 ENCOUNTER — OFFICE VISIT (OUTPATIENT)
Dept: FAMILY MEDICINE | Facility: CLINIC | Age: 38
End: 2023-11-21
Payer: COMMERCIAL

## 2023-11-21 VITALS
SYSTOLIC BLOOD PRESSURE: 112 MMHG | TEMPERATURE: 98.1 F | RESPIRATION RATE: 16 BRPM | HEART RATE: 78 BPM | OXYGEN SATURATION: 99 % | DIASTOLIC BLOOD PRESSURE: 73 MMHG

## 2023-11-21 DIAGNOSIS — R05.1 ACUTE COUGH: Primary | ICD-10-CM

## 2023-11-21 PROCEDURE — 99203 OFFICE O/P NEW LOW 30 MIN: CPT | Performed by: PHYSICIAN ASSISTANT

## 2023-11-21 RX ORDER — ESTRADIOL 0.1 MG/G
CREAM VAGINAL
COMMUNITY
Start: 2023-11-17 | End: 2024-07-29

## 2023-11-21 RX ORDER — ALBUTEROL SULFATE 90 UG/1
2 AEROSOL, METERED RESPIRATORY (INHALATION) EVERY 6 HOURS
Qty: 18 G | Refills: 0 | Status: SHIPPED | OUTPATIENT
Start: 2023-11-21 | End: 2024-07-29

## 2023-11-21 RX ORDER — BENZONATATE 100 MG/1
100 CAPSULE ORAL 3 TIMES DAILY PRN
Qty: 30 CAPSULE | Refills: 0 | Status: SHIPPED | OUTPATIENT
Start: 2023-11-21 | End: 2023-12-01

## 2023-11-21 NOTE — PROGRESS NOTES
Patient presents with:  Cough: Cough sob fatigue       Clinical Decision Making:  Patient is treated for respiratory illness with albuterol inhaler, Tessalon Perles and over-the-counter Delsym for cough. Expected course of resolution and indication for return was gone over and questions were answered to patient/parent's satisfaction before discharge.        ICD-10-CM    1. Acute cough  R05.1 albuterol (PROAIR HFA/PROVENTIL HFA/VENTOLIN HFA) 108 (90 Base) MCG/ACT inhaler     benzonatate (TESSALON) 100 MG capsule          Patient Instructions   You were seen today for acute bronchitis. This is likely due to a viral illness.    Symptom management:  - Get plenty of rest  - Avoid smoking and second hand smoke  - May take tylenol or ibuprofen for fever/discomfort  - Drink plenty of non-caffeinated fluids  - Use nasal steroid spray for sinus congestion  - Albuterol inhaler may be used every 6 hours as needed for chest tightness      Reasons to be seen in the emergency room:  - Develop a fever of 100.4 or higher  - Cough changes, coughing up blood, or become short of breath  - Neck stiffness  - Chest pain  - Severe headache  - Unable to tolerate eating or drinking fluids    Otherwise, if no symptom improvement after 5 days, follow-up with your primary care provider.        HPI:  Paulette Singer is a 38 year old female who presents today for 3-week history of dry nonproductive cough.  Initially patient has had headache fever and myalgias but those have abated.  Currently there is no fever muscle aches joint aches sore throat vomiting or diarrhea.  Continues to eat and drink normally.  No COVID testing was performed at home.  Use of vitamin C and supplemental immune boosters were used over-the-counter.    History obtained from chart review and the patient.    Problem List:  2021-04: Encounter for removal of ureteral stent  2021-03: Left flank pain  2021-03: Duplicated left renal collecting system  2021-03: Hydronephrosis of  left kidney  2018: Cystocele, midline  2018: Mixed stress and urge urinary incontinence  2018: Uterine prolapse  2017: Recurrent urinary tract infection  2017: Abnormal CAT scan  2017: Pregnancy  2016: History of oligohydramnios in prior pregnancy, currently   pregnant in third trimester  2016: Low ferritin  2016: Immunization refused-INFLUENZA & TDAP 2016: Encounter for supervision of other normal pregnancy,   unspecified trimester  2015: Interface dermatitis-forehead positive biopsy  2015: 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  2010-10: CARDIOVASCULAR SCREENING; LDL GOAL LESS THAN 160  2009: Plantar fasciitis  2008: Presence of intrauterine contraceptive device  2007: Other symptoms involving urinary system  2007: Headache  2007: Postconcussion syndrome  2007: Metrorrhagia  2007: Premenstrual tension syndrome      Past Medical History:   Diagnosis Date    Abnormal CAT scan 2017    bilateral duplicate renal    Chronic kidney disease     Cystocele, midline 2018    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     Headache 2007    Hernia, abdominal     Hiatal hernia     as a     Hyperthyroidism in pregnancy, antepartum     Low ferritin 2016    3rd pregnancy    Migraines age 17    migraine/sinus headaches, better with excedrin-as child    Mixed stress and urge urinary incontinence 2018    Palpitations     Plantar fasciitis 2009    See podiatry consult    PONV (postoperative nausea and vomiting)     PONV (postoperative nausea and vomiting)     Uterine prolapse 2018       Social History     Tobacco Use    Smoking status: Never    Smokeless tobacco: Never   Substance Use Topics    Alcohol use: Yes     Alcohol/week: 0.0 standard drinks of alcohol     Comment: glass a week       Review of Systems  As above in HPI otherwise negative.    Vitals:    23 1055   BP: 112/73   Pulse: 78   Resp: 16   Temp: 98.1  F (36.7  C)   TempSrc: Oral   SpO2: 99%        General: Patient is resting comfortably no acute distress is afebrile  HEENT: Head is normocephalic atraumatic   eyes are PERRL EOMI sclera anicteric   TMs are clear bilaterally  Throat is with mild pharyngeal wall erythema and no exudate  No cervical lymphadenopathy present  LUNGS: Clear to auscultation bilaterally dentition is breath sounds appreciated.  Normal respiratory effort and excursion.  HEART: Regular rate and rhythm  Skin: Without rash non-diaphoretic    Physical Exam    At the end of the encounter, I discussed results, diagnosis, medications. Discussed red flags for immediate return to clinic/ER, as well as indications for follow up if no improvement. Patient understood and agreed to plan. Patient was stable for discharge.

## 2024-01-26 ENCOUNTER — TRANSFERRED RECORDS (OUTPATIENT)
Dept: HEALTH INFORMATION MANAGEMENT | Facility: CLINIC | Age: 39
End: 2024-01-26

## 2024-06-16 ENCOUNTER — HEALTH MAINTENANCE LETTER (OUTPATIENT)
Age: 39
End: 2024-06-16

## 2024-07-13 ENCOUNTER — HOSPITAL ENCOUNTER (EMERGENCY)
Facility: CLINIC | Age: 39
Discharge: HOME | End: 2024-07-14
Attending: EMERGENCY MEDICINE | Admitting: EMERGENCY MEDICINE
Payer: COMMERCIAL

## 2024-07-13 DIAGNOSIS — I49.3 PVC'S (PREMATURE VENTRICULAR CONTRACTIONS): ICD-10-CM

## 2024-07-13 DIAGNOSIS — R00.2 PALPITATIONS: ICD-10-CM

## 2024-07-13 LAB
ALBUMIN SERPL BCG-MCNC: 4.4 G/DL (ref 3.5–5.2)
ALP SERPL-CCNC: 58 U/L (ref 40–150)
ALT SERPL W P-5'-P-CCNC: 10 U/L (ref 0–50)
ANION GAP SERPL CALCULATED.3IONS-SCNC: 10 MMOL/L (ref 7–15)
AST SERPL W P-5'-P-CCNC: 24 U/L (ref 0–45)
BASOPHILS # BLD AUTO: 0.1 10E3/UL (ref 0–0.2)
BASOPHILS NFR BLD AUTO: 1 %
BILIRUB SERPL-MCNC: 0.5 MG/DL
BUN SERPL-MCNC: 19.2 MG/DL (ref 6–20)
CALCIUM SERPL-MCNC: 9.3 MG/DL (ref 8.6–10)
CHLORIDE SERPL-SCNC: 104 MMOL/L (ref 98–107)
CREAT SERPL-MCNC: 0.93 MG/DL (ref 0.51–0.95)
D DIMER PPP FEU-MCNC: <=0.27 UG/ML FEU (ref 0–0.5)
DEPRECATED HCO3 PLAS-SCNC: 27 MMOL/L (ref 22–29)
EGFRCR SERPLBLD CKD-EPI 2021: 81 ML/MIN/1.73M2
EOSINOPHIL # BLD AUTO: 0.1 10E3/UL (ref 0–0.7)
EOSINOPHIL NFR BLD AUTO: 1 %
ERYTHROCYTE [DISTWIDTH] IN BLOOD BY AUTOMATED COUNT: 12.6 % (ref 10–15)
GLUCOSE SERPL-MCNC: 103 MG/DL (ref 70–99)
HCT VFR BLD AUTO: 41 % (ref 35–47)
HGB BLD-MCNC: 14.3 G/DL (ref 11.7–15.7)
IMM GRANULOCYTES # BLD: 0 10E3/UL
IMM GRANULOCYTES NFR BLD: 0 %
LYMPHOCYTES # BLD AUTO: 3.3 10E3/UL (ref 0.8–5.3)
LYMPHOCYTES NFR BLD AUTO: 41 %
MAGNESIUM SERPL-MCNC: 2 MG/DL (ref 1.7–2.3)
MCH RBC QN AUTO: 29.5 PG (ref 26.5–33)
MCHC RBC AUTO-ENTMCNC: 34.9 G/DL (ref 31.5–36.5)
MCV RBC AUTO: 85 FL (ref 78–100)
MONOCYTES # BLD AUTO: 0.5 10E3/UL (ref 0–1.3)
MONOCYTES NFR BLD AUTO: 7 %
NEUTROPHILS # BLD AUTO: 4 10E3/UL (ref 1.6–8.3)
NEUTROPHILS NFR BLD AUTO: 50 %
NRBC # BLD AUTO: 0 10E3/UL
NRBC BLD AUTO-RTO: 0 /100
PLATELET # BLD AUTO: 229 10E3/UL (ref 150–450)
POTASSIUM SERPL-SCNC: 3.5 MMOL/L (ref 3.4–5.3)
PROT SERPL-MCNC: 6.3 G/DL (ref 6.4–8.3)
RBC # BLD AUTO: 4.85 10E6/UL (ref 3.8–5.2)
SODIUM SERPL-SCNC: 141 MMOL/L (ref 135–145)
TROPONIN T SERPL HS-MCNC: <6 NG/L
TSH SERPL DL<=0.005 MIU/L-ACNC: 1.3 UIU/ML (ref 0.3–4.2)
WBC # BLD AUTO: 8 10E3/UL (ref 4–11)

## 2024-07-13 PROCEDURE — 85025 COMPLETE CBC W/AUTO DIFF WBC: CPT | Performed by: EMERGENCY MEDICINE

## 2024-07-13 PROCEDURE — 93005 ELECTROCARDIOGRAM TRACING: CPT | Performed by: EMERGENCY MEDICINE

## 2024-07-13 PROCEDURE — 36415 COLL VENOUS BLD VENIPUNCTURE: CPT | Performed by: EMERGENCY MEDICINE

## 2024-07-13 PROCEDURE — 85379 FIBRIN DEGRADATION QUANT: CPT | Performed by: EMERGENCY MEDICINE

## 2024-07-13 PROCEDURE — 82040 ASSAY OF SERUM ALBUMIN: CPT | Performed by: EMERGENCY MEDICINE

## 2024-07-13 PROCEDURE — 83735 ASSAY OF MAGNESIUM: CPT | Performed by: EMERGENCY MEDICINE

## 2024-07-13 PROCEDURE — 99284 EMERGENCY DEPT VISIT MOD MDM: CPT | Mod: 25

## 2024-07-13 PROCEDURE — 96360 HYDRATION IV INFUSION INIT: CPT

## 2024-07-13 PROCEDURE — 84484 ASSAY OF TROPONIN QUANT: CPT | Performed by: EMERGENCY MEDICINE

## 2024-07-13 PROCEDURE — 258N000003 HC RX IP 258 OP 636: Performed by: EMERGENCY MEDICINE

## 2024-07-13 PROCEDURE — 84443 ASSAY THYROID STIM HORMONE: CPT | Performed by: EMERGENCY MEDICINE

## 2024-07-13 RX ADMIN — SODIUM CHLORIDE 1000 ML: 9 INJECTION, SOLUTION INTRAVENOUS at 23:06

## 2024-07-13 ASSESSMENT — COLUMBIA-SUICIDE SEVERITY RATING SCALE - C-SSRS
6. HAVE YOU EVER DONE ANYTHING, STARTED TO DO ANYTHING, OR PREPARED TO DO ANYTHING TO END YOUR LIFE?: NO
2. HAVE YOU ACTUALLY HAD ANY THOUGHTS OF KILLING YOURSELF IN THE PAST MONTH?: NO
1. IN THE PAST MONTH, HAVE YOU WISHED YOU WERE DEAD OR WISHED YOU COULD GO TO SLEEP AND NOT WAKE UP?: NO

## 2024-07-13 ASSESSMENT — ENCOUNTER SYMPTOMS
LIGHT-HEADEDNESS: 1
PALPITATIONS: 1

## 2024-07-13 ASSESSMENT — ACTIVITIES OF DAILY LIVING (ADL): ADLS_ACUITY_SCORE: 38

## 2024-07-14 VITALS
TEMPERATURE: 98.2 F | WEIGHT: 150 LBS | OXYGEN SATURATION: 100 % | RESPIRATION RATE: 15 BRPM | DIASTOLIC BLOOD PRESSURE: 67 MMHG | SYSTOLIC BLOOD PRESSURE: 126 MMHG | HEIGHT: 67 IN | BODY MASS INDEX: 23.54 KG/M2 | HEART RATE: 51 BPM

## 2024-07-14 LAB
ATRIAL RATE - MUSE: 60 BPM
DIASTOLIC BLOOD PRESSURE - MUSE: 71 MMHG
INTERPRETATION ECG - MUSE: NORMAL
P AXIS - MUSE: 72 DEGREES
PR INTERVAL - MUSE: 120 MS
QRS DURATION - MUSE: 78 MS
QT - MUSE: 430 MS
QTC - MUSE: 430 MS
R AXIS - MUSE: 79 DEGREES
SYSTOLIC BLOOD PRESSURE - MUSE: 116 MMHG
T AXIS - MUSE: 72 DEGREES
VENTRICULAR RATE- MUSE: 60 BPM

## 2024-07-14 NOTE — ED PROVIDER NOTES
NAME: Paulette Singer  AGE: 39 year old female  YOB: 1985  MRN: [id number]  EVALUATION DATE & TIME: 7/13/2024 10:15 PM    PCP: Joey Campos    ED PROVIDER: Usama Siu M.D.      Chief Complaint   Patient presents with    Palpitations    Shortness of Breath    Dizziness     FINAL IMPRESSION:  1. PVC's (premature ventricular contractions)    2. Palpitations        MEDICAL DECISION MAKING:    10:31 PM I met with the patient, obtained history, performed an initial exam, and discussed options and plan for diagnostics and treatment here in the ED.   11:38 PM We discussed the plan for discharge and the patient is agreeable. Reviewed supportive cares, symptomatic treatment, outpatient follow up, and reasons to return to the Emergency Department. Patient to be discharged by ED RN.     Patient was clinically assessed and consented to treatment. After assessment, medical decision making and workup were discussed with the patient. The patient was agreeable to plan for testing, workup, and treatment.  Pertinent Labs & Imaging studies reviewed. (See chart for details)       Medical Decision Making  Obtained supplemental history:Supplemental history obtained?: Documented in chart  Reviewed external records: External records reviewed?: Documented in chart  Care impacted by chronic illness:Chronic Kidney Disease  Care significantly affected by social determinants of health:Access to Medical Care  Did you consider but not order tests?: In addition to work-up documented, I considered the following work up:   Did you interpret images independently?: Independent interpretation of ECG and images noted in documentation, when applicable.  Consultation discussion with other provider:Did you involve another provider (consultant, , pharmacy, etc.)?: No  Discharge. No recommendations on prescription strength medication(s). See documentation for any additional details.    Paulette Singer is a 39 year old female  who presents with palpitations, shortness of breath, dizziness.   Differential diagnosis includes but not limited to atrial fibrillation, electrolyte imbalance, dehydration, anemia, PVCs, pulmonary embolism.  Patient is a otherwise healthy 39-year-old female who presents with feelings of palpitations.  Patient reported intermittent palpitations and while I was talking her she did report feeling 1 at which time I noted on the monitor patient had a PVC.  She reports they are more flutters that are intermittent which would fit with the PVCs that I am seeing intermittently about every 30-60 seconds on the monitor.  Patient does not ingest the same amount of caffeine and not on any special diet.  She does report recent trip with some significant exertion outside in the heat and humidity the day before.  She reports trying to drink plenty of fluids as well as adding electrolytes to the water she was drinking but mucous membranes were dry.  EKG done here did not show any acute abnormalities or PVCs were caught.  There were noted on the monitor and rhythm strip.  Labs were done and showed normal electrolytes, negative troponin and negative D-dimer.  Patient reassured by these and she did receive a liter of fluids after which I noted a few less PVCs.  She did have 1 while is watching but that was over several minutes.  I went and discussed with patient which she reports still feeling a slight flutter here and there but had been resting comfortably in the ER and I feel some of this could be hydration well.  She has maintained oxygen saturation on the monitor and at this time with a negative D-dimer I would not proceed with CTA given the low risk for PE.  As well I do not feel repeat troponin will be necessary given that her symptoms have been going on for over a day.  After discussion with patient and reassurance patient will be discharged home with follow-up to her primary doctor or rapid access clinic to have a event  monitor or Holter monitor placed.  I did consider low-dose beta-blocker however patient's resting heart rate was in the 50s to 60s when she was sleeping and I do not feel the beta-blocker would be appropriate given that she has such a low heart rate to begin with.  Patient otherwise comfortable and reassured with full discussion after workup and will be discharged home.    0 minutes of critical care time    MEDICATIONS GIVEN IN THE EMERGENCY:  Medications   sodium chloride 0.9% BOLUS 1,000 mL (0 mLs Intravenous Stopped 24 0003)       NEW PRESCRIPTIONS STARTED AT TODAY'S ER VISIT:  Discharge Medication List as of 2024 12:04 AM             =================================================================    HPI    Patient information was obtained from: Patient    Use of : N/A       Pauletteannalee Singer is a 39 year old female with a past medical history of CKD, who presents palpitations.    Patient reports palpitations that started three days ago. Initially, her symptoms were intermittent. However, since onset, her palpitations have gotten worse and have become more frequent. Patient states that earlier this afternoon, it has been a constant feeling that has made her feel weak and lightheaded, prompting ED visit. Otherwise, she denies any recent excess caffeine intake. No other complaints at this time.    REVIEW OF SYSTEMS   Review of Systems   Cardiovascular:  Positive for palpitations.   Neurological:  Positive for light-headedness.   All other systems reviewed and are negative.       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     Headache 2007    Hernia, abdominal     Hiatal hernia     as a     Hyperthyroidism in pregnancy, antepartum     Low ferritin 2016    3rd pregnancy    Migraines age 17    migraine/sinus headaches,  better with excedrin-as child    Mixed stress and urge urinary incontinence 7/23/2018    Palpitations     Plantar fasciitis 2/25/2009    See podiatry consult    PONV (postoperative nausea and vomiting)     PONV (postoperative nausea and vomiting)     Uterine prolapse 7/23/2018       PAST SURGICAL HISTORY:  Past Surgical History:   Procedure Laterality Date    COMBINED CYSTOSCOPY, RETROGRADES, URETEROSCOPY, INSERT STENT Left 5/10/2021    Procedure: CYSTOSCOPY, WITH LEFT RETROGRADE PYELOGRAM AND URETERAL STENT REMOVAL;  Surgeon: Brendan Hanson MD;  Location:  OR    CYSTOSCOPY N/A 7/30/2018    Procedure: CYSTOSCOPY;;  Surgeon: Ed Noriega MD;  Location: [facility]    CYSTOSCOPY, INJECT VESICOURETERAL REFLUX GEL N/A 6/7/2017    Procedure: CYSTOSCOPY, INJECT VESICOURETERAL REFLUX GEL;  CYSTOSCOPY WITH SUBMUCOSAL INJECTION OF DEFLUX ;  Surgeon: Jeferson White MD;  Location:  OR    CYSTOSCOPY, RETROGRADES, COMBINED Bilateral 4/19/2017    Procedure: COMBINED CYSTOSCOPY, RETROGRADES;  CYSTOSCOPY, LEFT URETEROSCOPY, LEFT STENT PLACEMENT & RETRIEVAL, BILATERAL RETROGRADES;  Surgeon: Jeferson White MD;  Location:  OR    CYSTOSCOPY, URETEROSCOPY, COMBINED Left 4/19/2017    Procedure: COMBINED CYSTOSCOPY, URETEROSCOPY;;  Surgeon: Jeferson White MD;  Location:  OR    DAVINCI PELVIC PROCEDURE Left 4/12/2021    Procedure: ROBOTIC ASSISTED LEFT URETEROURETEROSTOMY, CYSTOSCOPY, LEFT URETERAL STENT PLACEMENT;  Surgeon: Brendan Hanson MD;  Location:  OR    HERNIA REPAIR, INGUINAL RT/LT      bilateral as a baby    HYSTERECTOMY      HYSTERECTOMY VAGINAL N/A 7/30/2018    Procedure: HYSTERECTOMY VAGINAL;  Total Vaginal Hysterectomy, Bilateral salpingectomy;  Surgeon: Ed Noriega MD;  Location: [facility] OR    MOUTH SURGERY      [place] teeth    PELVIS LAPAROSCOPY,DX  6/2009    normal    DE LAP,RMV  ADNEXAL STRUCTURE Left 5/21/2020    Procedure: LAPAROSCOPIC EXCISION OF ENDOMETREOSIS, REPAIR VAGINAL CUFF;   Surgeon: Jim Ramirez MD;  Location: [facility];  Service: Gynecology    [facility] UGI ENDOSCOPY, SIMPLE EXAM  09/11/2007       CURRENT MEDICATIONS:    No current facility-administered medications for this encounter.    Current Outpatient Medications:     albuterol (PROAIR HFA/PROVENTIL HFA/VENTOLIN HFA) 108 (90 Base) MCG/ACT inhaler, Inhale 2 puffs into the lungs every 6 hours for 30 days, Disp: 18 g, Rfl: 0    estradiol (ESTRACE) 0.1 MG/GM vaginal cream, 1 gram Vaginal nightly x 2 weeks, then 2-3 times per week for 30 days, Disp: , Rfl:     ALLERGIES:  Allergies   Allergen Reactions    Amoxicillin Rash    Pcn [Penicillins] Rash       FAMILY HISTORY:  Family History   Problem Relation Age of Onset    Depression Mother     Thyroid Disease Mother     Gynecology Sister         endometriosis    Cancer Paternal Grandmother         eye       SOCIAL HISTORY:   Social History     Socioeconomic History    Marital status:     Number of children: 0   Occupational History    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 No     Service No    Blood Transfusions No    Caffeine Concern No    Occupational Exposure Yes     Comment: Holistic healing    Hobby Hazards No    Sleep Concern No    Stress Concern No    Weight Concern No    Special Diet No    Back Care No    Exercise Yes    Seat Belt Yes   Social History Narrative    Lives in [place] with , Timur and their two sons.  No concerns about domestic violence.  No smokers in the home.  No indoor cats/kittens.       PHYSICAL EXAM:    Vitals: /67    "Pulse 51   Temp 98.2  F (36.8  C) (Oral)   Resp 15   Ht 1.702 m (5' 7\")   Wt 68 kg (150 lb)   LMP 07/30/2018   SpO2 100%   BMI 23.49 kg/m     Physical Exam  Vitals and nursing note reviewed.   Constitutional:       General: She is not in acute distress.     Appearance: She is well-developed and normal weight. She is not ill-appearing or toxic-appearing.   HENT:      Head: Normocephalic.      Mouth/Throat:      Pharynx: Oropharynx is clear.      Comments: Dry lips and mucous membrane  Eyes:      Extraocular Movements: Extraocular movements intact.      Pupils: Pupils are equal, round, and reactive to light.   Neck:      Thyroid: No thyromegaly.   Cardiovascular:      Rate and Rhythm: Normal rate and regular rhythm. Extrasystoles (Occasional extra beat) are present.     Pulses: Normal pulses.      Heart sounds: Normal heart sounds.   Pulmonary:      Effort: Pulmonary effort is normal. No tachypnea, bradypnea, accessory muscle usage or respiratory distress.      Breath sounds: Normal breath sounds. No decreased breath sounds.   Chest:      Chest wall: No tenderness.   Abdominal:      Palpations: Abdomen is soft.      Tenderness: There is no abdominal tenderness.   Musculoskeletal:      Cervical back: Normal range of motion and neck supple.      Right lower leg: No tenderness. No edema.      Left lower leg: No tenderness. No edema.   Skin:     General: Skin is warm and dry.      Coloration: Skin is not cyanotic or pale.   Neurological:      General: No focal deficit present.      Mental Status: She is alert and oriented to person, place, and time.      Motor: No weakness.   Psychiatric:         Behavior: Behavior normal.           LAB:  All pertinent labs reviewed and interpreted.  Labs Ordered and Resulted from Time of ED Arrival to Time of ED Departure   COMPREHENSIVE METABOLIC PANEL - Abnormal       Result Value    Sodium 141      Potassium 3.5      Carbon Dioxide (CO2) 27      Anion Gap 10      Urea Nitrogen " 19.2      Creatinine 0.93      GFR Estimate 81      Calcium 9.3      Chloride 104      Glucose 103 (*)     Alkaline Phosphatase 58      AST 24      ALT 10      Protein Total 6.3 (*)     Albumin 4.4      Bilirubin Total 0.5     D DIMER QUANTITATIVE - Normal    D-Dimer Quantitative <=0.27     MAGNESIUM - Normal    Magnesium 2.0     TROPONIN T, HIGH SENSITIVITY - Normal    Troponin T, High Sensitivity <6     TSH WITH FREE T4 REFLEX - Normal    TSH 1.30     CBC WITH PLATELETS AND DIFFERENTIAL    WBC Count 8.0      RBC Count 4.85      Hemoglobin 14.3      Hematocrit 41.0      MCV 85      MCH 29.5      MCHC 34.9      RDW 12.6      Platelet Count 229      % Neutrophils 50      % Lymphocytes 41      % Monocytes 7      % Eosinophils 1      % Basophils 1      % Immature Granulocytes 0      NRBCs per 100 WBC 0      Absolute Neutrophils 4.0      Absolute Lymphocytes 3.3      Absolute Monocytes 0.5      Absolute Eosinophils 0.1      Absolute Basophils 0.1      Absolute Immature Granulocytes 0.0      Absolute NRBCs 0.0         RADIOLOGY:  No orders to display       EKG:   Performed at: 13-Jul-2024, 22:29:45.  Impression: Sinus rhythm with normal EKG, no signs of significant ST elevation ischemia, no irregular rhythm, no T wave changes, no ectopy  Rate: 60 bpm  Rhythm: Sinus rhythm  QRS Interval: 78  QTc Interval: 430  Comparison: Compared with ECG of 03-Dec-2020, 10:58. No significant change was found.  I have independently reviewed and interpreted the EKG(s) documented above.     PROCEDURES:   Procedures       I, Jerica Bojorquez, am serving as a scribe to document services personally performed by Dr. Usama Siu  based on my observation and the provider's statements to me. I, Usama Siu MD attest that Jerica Bojorquez is acting in a scribe capacity, has observed my performance of the services and has documented them in accordance with my direction.      Usama Siu M.D.  Emergency Medicine  [facility]  Emergency Department       Usama Siu MD  07/15/24 4230

## 2024-07-14 NOTE — ED TRIAGE NOTES
"Patient arrives to the ER with c/o a fluttery feeling her her chest that causes shortness of breath. She states that it started a few days ago, but was intermittent. States that since earlier this afternoon, it has been a constant feeling that has made her feel weak and like she was going to pass out.      Triage Assessment (Adult)       Row Name 07/13/24 9313          Triage Assessment    Airway WDL WDL        Respiratory WDL    Respiratory WDL X;rhythm/pattern     Rhythm/Pattern, Respiratory shortness of breath  shortness of breath when feeling palpitations        Skin Circulation/Temperature WDL    Skin Circulation/Temperature WDL WDL        Cardiac WDL    Cardiac WDL X  \"fluttery\" feeling in her chest        Peripheral/Neurovascular WDL    Peripheral Neurovascular WDL WDL        Cognitive/Neuro/Behavioral WDL    Cognitive/Neuro/Behavioral WDL WDL                     "

## 2024-07-29 ENCOUNTER — OFFICE VISIT (OUTPATIENT)
Dept: CARDIOLOGY | Facility: CLINIC | Age: 39
End: 2024-07-29
Attending: EMERGENCY MEDICINE
Payer: COMMERCIAL

## 2024-07-29 VITALS
HEIGHT: 67 IN | WEIGHT: 151 LBS | SYSTOLIC BLOOD PRESSURE: 122 MMHG | BODY MASS INDEX: 23.7 KG/M2 | HEART RATE: 64 BPM | DIASTOLIC BLOOD PRESSURE: 72 MMHG | OXYGEN SATURATION: 99 % | RESPIRATION RATE: 16 BRPM

## 2024-07-29 DIAGNOSIS — I49.3 PVC'S (PREMATURE VENTRICULAR CONTRACTIONS): Primary | ICD-10-CM

## 2024-07-29 DIAGNOSIS — R00.2 PALPITATIONS: ICD-10-CM

## 2024-07-29 PROCEDURE — 99203 OFFICE O/P NEW LOW 30 MIN: CPT | Performed by: INTERNAL MEDICINE

## 2024-07-29 NOTE — PROGRESS NOTES
Click to link to [facility] HEART CARE NOTE    Thank you, Dr. Siu, for asking me to see Paulette Singer in consultation at [facility] to evaluate palpitations.      Assessment/Plan:   Palpitations: The patient developed palpitations as fluttering heartbeats after she drank energy drinks.  After she quit, her palpitations have been significantly improved.  She was evaluated for palpitations 5 years ago and treated with PVCs.  She had a ER evaluation which was negative.  On telemetry monitor in emergency room, she did have occasional PVCs.  We discussed benign phenomenon of PVCs without structural heart disease.  Discussed avoid large amount of caffeine, energy drinks, alcohol, stress, doing lifestyle modification.  Also offered atenolol as needed for palpitations, but the patient declined.  No indication for further cardiac evaluation at this time.  If she has more frequent symptoms again, she will call me back.    Thank you for the opportunity to be involved in the care of Paulette Singer. If you have any questions, please feel free to contact me.  I will see the patient again as needed    Much or all of the text in this note was generated through the use of Dragon Dictate voice-to-text software. Errors in spelling or words which seem out of context are unintentional.   Sound alike errors, in particular, may have escaped editing.       History of Present Illness:   It is my pleasure to see Paulette Singer at the [facility] clinic for evaluation of New Patient. Paulette Singer is a 39 year old female without significant past medical history.    The patient developed palpitations for several days leading to ER visit.  She states that she drank energy drinks prior to developing palpitations.  Since ER visit, over last 2 weeks, her palpitations have been significantly improved.  She described her palpitations as fluttering heartbeats,  associated with shortness of breath, lightheadedness, dizziness.  She denies chest pain, orthopnea, PND or leg edema.  Her blood pressure and heart rate are in normal range today.    Her ER evaluation was impressive.  On her ER note, she did have occasional PVC on telemetry monitor.  She was evaluated for palpitations 5 years ago and managed PVC at that time.    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    Immunization refused-INFLUENZA & TDAP [id number]    Abnormal CAT scan    Recurrent urinary tract infection    Cystocele, midline    Mixed stress and urge urinary incontinence    Uterine prolapse    Left flank pain    Duplicated left renal collecting system    Hydronephrosis of left kidney    Encounter for removal of ureteral stent       Past Surgical History:     Past Surgical History:   Procedure Laterality Date    COMBINED CYSTOSCOPY, RETROGRADES, URETEROSCOPY, INSERT STENT Left 5/10/2021    Procedure: CYSTOSCOPY, WITH LEFT RETROGRADE PYELOGRAM AND URETERAL STENT REMOVAL;  Surgeon: Brendan Hanson MD;  Location:  OR    CYSTOSCOPY N/A 7/30/2018    Procedure: CYSTOSCOPY;;  Surgeon: Ed Noriega MD;  Location:  OR    CYSTOSCOPY, INJECT VESICOURETERAL REFLUX GEL N/A 6/7/2017    Procedure: CYSTOSCOPY, INJECT VESICOURETERAL REFLUX GEL;  CYSTOSCOPY WITH SUBMUCOSAL INJECTION OF DEFLUX ;  Surgeon: Jeferson White MD;  Location:  OR    CYSTOSCOPY, RETROGRADES, COMBINED Bilateral 4/19/2017    Procedure: COMBINED CYSTOSCOPY, RETROGRADES;  CYSTOSCOPY, LEFT URETEROSCOPY, LEFT STENT PLACEMENT & RETRIEVAL, BILATERAL RETROGRADES;  Surgeon: Jeferson White MD;  Location:  OR    CYSTOSCOPY, URETEROSCOPY, COMBINED Left 4/19/2017    Procedure: COMBINED CYSTOSCOPY, URETEROSCOPY;;  Surgeon: Jeferson White MD;  Location:  OR    DAVINCI PELVIC PROCEDURE Left  "4/12/2021    Procedure: ROBOTIC ASSISTED LEFT URETEROURETEROSTOMY, CYSTOSCOPY, LEFT URETERAL STENT PLACEMENT;  Surgeon: Brendan Hanson MD;  Location: [facility] OR    HERNIA REPAIR, INGUINAL RT/LT      bilateral as a baby    HYSTERECTOMY      HYSTERECTOMY VAGINAL N/A 7/30/2018    Procedure: HYSTERECTOMY VAGINAL;  Total Vaginal Hysterectomy, Bilateral salpingectomy;  Surgeon: Ed Noriega MD;  Location: [facility] OR    MOUTH SURGERY      [place] teeth    PELVIS LAPAROSCOPY,DX  6/2009    normal    TX LAP,RMV  ADNEXAL STRUCTURE Left 5/21/2020    Procedure: LAPAROSCOPIC EXCISION OF ENDOMETREOSIS, REPAIR VAGINAL CUFF;  Surgeon: Jim Ramirez MD;  Location: [facility];  Service: Gynecology    [facility] UGI ENDOSCOPY, SIMPLE EXAM  09/11/2007       Family History:     Family History   Problem Relation Age of Onset    Depression Mother     Thyroid Disease Mother     Gynecology Sister         endometriosis    Cancer Paternal Grandmother         eye       Social History:    reports that she has never smoked. She has never been exposed to tobacco smoke. She has never used smokeless tobacco. She reports current alcohol use. She reports that she does not use drugs.    Review of Systems:   12 systems are reviewed negative except for in HPI.    Meds:     Current Outpatient Medications:     Multiple Vitamin (MULTIVITAMIN ADULT PO), Take 1 tablet by mouth daily, Disp: , Rfl:      Allergies:   Amoxicillin and Pcn [penicillins]    Objective:      Physical Exam  68.5 kg (151 lb)  1.702 m (5' 7\")  Body mass index is 23.65 kg/m .  /72 (BP Location: Right arm, Patient Position: Sitting, Cuff Size: Adult Regular)   Pulse 64   Resp 16   Ht 1.702 m (5' 7\")   Wt 68.5 kg (151 lb)   LMP 07/30/2018   SpO2 99%   BMI 23.65 kg/m      General Appearance:   Awake, Alert, No acute distress.   HEENT:  Pupil equal, reactive to light. No scleral icterus; the mucous membranes were moist. No oral ulcers or thrush.    Neck: No cervical bruits. No " JVD. No thyromegaly. No lymph node enlargement or tenderness.   Chest: The spine was straight. The chest was symmetric.   Lungs:   Respirations unlabored. Lungs are clear to auscultation. No crackles. No wheezing.   Cardiovascular:   RRR, normal first and second heart sounds with no murmurs. No rubs or gallops.    Abdomen:  Soft. No tenderness. Non-distended. Bowels sounds are present   Extremities: Equal posterior tibial pulses. No leg edema.   Skin: No rashes or ulcers. Warm, Dry.   Musculoskeletal: No tenderness. No deformity.   Neurologic: Mood and affect are appropriate. No focal deficits.         EKG:  Personally reivewed  Normal sinus rhythm  Normal ECG    Lab Review   Lab Results   Component Value Date     07/13/2024     04/13/2021    CO2 27 07/13/2024    CO2 25 04/13/2021    BUN 19.2 07/13/2024    BUN 12 04/13/2021     Lab Results   Component Value Date    WBC 8.0 07/13/2024    WBC 11.8 04/13/2021    HGB 14.3 07/13/2024    HGB 13.7 04/13/2021    HCT 41.0 07/13/2024    HCT 41.4 04/13/2021    MCV 85 07/13/2024    MCV 90 04/13/2021     07/13/2024     04/13/2021     Lab Results   Component Value Date    TSH 1.30 07/13/2024    TSH 0.71 09/22/2019    TSH 0.25 07/25/2017

## 2024-07-29 NOTE — LETTER
7/29/2024    Joey Campos MD  [address]  [place] [address]    RE: Paulette VLAD Enmanuel       Dear Colleague,     I had the pleasure of seeing Paulette Singer in the [facility].      Click to link to [facility] HEART CARE NOTE    Thank you, Dr. Siu, for asking me to see Paulette Singer in consultation at [facility] to evaluate palpitations.      Assessment/Plan:   Palpitations: The patient developed palpitations as fluttering heartbeats after she drank energy drinks.  After she quit, her palpitations have been significantly improved.  She was evaluated for palpitations 5 years ago and treated with PVCs.  She had a ER evaluation which was negative.  On telemetry monitor in emergency room, she did have occasional PVCs.  We discussed benign phenomenon of PVCs without structural heart disease.  Discussed avoid large amount of caffeine, energy drinks, alcohol, stress, doing lifestyle modification.  Also offered atenolol as needed for palpitations, but the patient declined.  No indication for further cardiac evaluation at this time.  If she has more frequent symptoms again, she will call me back.    Thank you for the opportunity to be involved in the care of Paulette Singer. If you have any questions, please feel free to contact me.  I will see the patient again as needed    Much or all of the text in this note was generated through the use of Dragon Dictate voice-to-text software. Errors in spelling or words which seem out of context are unintentional.   Sound alike errors, in particular, may have escaped editing.       History of Present Illness:   It is my pleasure to see Pauletteannalee Jordancher at the [facility] for evaluation of New Patient. Paulette Singer is a 39 year old female without significant past medical history.    The patient developed palpitations for several days leading to  ER visit.  She states that she drank energy drinks prior to developing palpitations.  Since ER visit, over last 2 weeks, her palpitations have been significantly improved.  She described her palpitations as fluttering heartbeats, associated with shortness of breath, lightheadedness, dizziness.  She denies chest pain, orthopnea, PND or leg edema.  Her blood pressure and heart rate are in normal range today.    Her ER evaluation was impressive.  On her ER note, she did have occasional PVC on telemetry monitor.  She was evaluated for palpitations 5 years ago and managed PVC at that time.    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    Immunization refused-INFLUENZA & TDAP [id number]    Abnormal CAT scan    Recurrent urinary tract infection    Cystocele, midline    Mixed stress and urge urinary incontinence    Uterine prolapse    Left flank pain    Duplicated left renal collecting system    Hydronephrosis of left kidney    Encounter for removal of ureteral stent       Past Surgical History:     Past Surgical History:   Procedure Laterality Date    COMBINED CYSTOSCOPY, RETROGRADES, URETEROSCOPY, INSERT STENT Left 5/10/2021    Procedure: CYSTOSCOPY, WITH LEFT RETROGRADE PYELOGRAM AND URETERAL STENT REMOVAL;  Surgeon: Brendan Hanson MD;  Location:  OR    CYSTOSCOPY N/A 7/30/2018    Procedure: CYSTOSCOPY;;  Surgeon: Ed Noriega MD;  Location:  OR    CYSTOSCOPY, INJECT VESICOURETERAL REFLUX GEL N/A 6/7/2017    Procedure: CYSTOSCOPY, INJECT VESICOURETERAL REFLUX GEL;  CYSTOSCOPY WITH SUBMUCOSAL INJECTION OF DEFLUX ;  Surgeon: Jeferson White MD;  Location:  OR    CYSTOSCOPY, RETROGRADES, COMBINED Bilateral 4/19/2017    Procedure: COMBINED CYSTOSCOPY, RETROGRADES;  CYSTOSCOPY, LEFT URETEROSCOPY, LEFT STENT PLACEMENT & RETRIEVAL, BILATERAL RETROGRADES;  Surgeon:  "Jeferson White MD;  Location:  OR    CYSTOSCOPY, URETEROSCOPY, COMBINED Left 4/19/2017    Procedure: COMBINED CYSTOSCOPY, URETEROSCOPY;;  Surgeon: Jeferson White MD;  Location:  OR    DAVINCI PELVIC PROCEDURE Left 4/12/2021    Procedure: ROBOTIC ASSISTED LEFT URETEROURETEROSTOMY, CYSTOSCOPY, LEFT URETERAL STENT PLACEMENT;  Surgeon: Brendan Hanson MD;  Location:  OR    HERNIA REPAIR, INGUINAL RT/LT      bilateral as a baby    HYSTERECTOMY      HYSTERECTOMY VAGINAL N/A 7/30/2018    Procedure: HYSTERECTOMY VAGINAL;  Total Vaginal Hysterectomy, Bilateral salpingectomy;  Surgeon: Ed Noriega MD;  Location:  OR    MOUTH SURGERY      [place] teeth    PELVIS LAPAROSCOPY,DX  6/2009    normal    AZ LAP,RMV  ADNEXAL STRUCTURE Left 5/21/2020    Procedure: LAPAROSCOPIC EXCISION OF ENDOMETREOSIS, REPAIR VAGINAL CUFF;  Surgeon: Jim Ramirez MD;  Location: [facility];  Service: Gynecology    [facility] UGI ENDOSCOPY, SIMPLE EXAM  09/11/2007       Family History:     Family History   Problem Relation Age of Onset    Depression Mother     Thyroid Disease Mother     Gynecology Sister         endometriosis    Cancer Paternal Grandmother         eye       Social History:    reports that she has never smoked. She has never been exposed to tobacco smoke. She has never used smokeless tobacco. She reports current alcohol use. She reports that she does not use drugs.    Review of Systems:   12 systems are reviewed negative except for in HPI.    Meds:     Current Outpatient Medications:     Multiple Vitamin (MULTIVITAMIN ADULT PO), Take 1 tablet by mouth daily, Disp: , Rfl:      Allergies:   Amoxicillin and Pcn [penicillins]    Objective:      Physical Exam  68.5 kg (151 lb)  1.702 m (5' 7\")  Body mass index is 23.65 kg/m .  /72 (BP Location: Right arm, Patient Position: Sitting, Cuff Size: Adult Regular)   Pulse 64   Resp 16   Ht 1.702 m (5' 7\")   Wt 68.5 kg (151 lb)   LMP 07/30/2018   SpO2 99%  "  BMI 23.65 kg/m      General Appearance:   Awake, Alert, No acute distress.   HEENT:  Pupil equal, reactive to light. No scleral icterus; the mucous membranes were moist. No oral ulcers or thrush.    Neck: No cervical bruits. No JVD. No thyromegaly. No lymph node enlargement or tenderness.   Chest: The spine was straight. The chest was symmetric.   Lungs:   Respirations unlabored. Lungs are clear to auscultation. No crackles. No wheezing.   Cardiovascular:   RRR, normal first and second heart sounds with no murmurs. No rubs or gallops.    Abdomen:  Soft. No tenderness. Non-distended. Bowels sounds are present   Extremities: Equal posterior tibial pulses. No leg edema.   Skin: No rashes or ulcers. Warm, Dry.   Musculoskeletal: No tenderness. No deformity.   Neurologic: Mood and affect are appropriate. No focal deficits.         EKG:  Personally reivewed  Normal sinus rhythm  Normal ECG    Lab Review   Lab Results   Component Value Date     07/13/2024     04/13/2021    CO2 27 07/13/2024    CO2 25 04/13/2021    BUN 19.2 07/13/2024    BUN 12 04/13/2021     Lab Results   Component Value Date    WBC 8.0 07/13/2024    WBC 11.8 04/13/2021    HGB 14.3 07/13/2024    HGB 13.7 04/13/2021    HCT 41.0 07/13/2024    HCT 41.4 04/13/2021    MCV 85 07/13/2024    MCV 90 04/13/2021     07/13/2024     04/13/2021     Lab Results   Component Value Date    TSH 1.30 07/13/2024    TSH 0.71 09/22/2019    TSH 0.25 07/25/2017                   Thank you for allowing me to participate in the care of your patient.      Sincerely,     Juvencio Art MD     [facility]  cc:   Usama Siu MD  [address] [place] [id number]

## 2024-12-11 ENCOUNTER — OFFICE VISIT (OUTPATIENT)
Dept: URGENT CARE | Facility: URGENT CARE | Age: 39
End: 2024-12-11
Payer: COMMERCIAL

## 2024-12-11 ENCOUNTER — ANCILLARY PROCEDURE (OUTPATIENT)
Dept: GENERAL RADIOLOGY | Facility: CLINIC | Age: 39
End: 2024-12-11
Attending: PHYSICIAN ASSISTANT
Payer: COMMERCIAL

## 2024-12-11 VITALS
OXYGEN SATURATION: 97 % | SYSTOLIC BLOOD PRESSURE: 122 MMHG | HEART RATE: 74 BPM | BODY MASS INDEX: 24.28 KG/M2 | RESPIRATION RATE: 16 BRPM | WEIGHT: 155 LBS | TEMPERATURE: 98.1 F | DIASTOLIC BLOOD PRESSURE: 81 MMHG

## 2024-12-11 DIAGNOSIS — J18.9 ATYPICAL PNEUMONIA: Primary | ICD-10-CM

## 2024-12-11 PROCEDURE — 99213 OFFICE O/P EST LOW 20 MIN: CPT | Performed by: PHYSICIAN ASSISTANT

## 2024-12-11 PROCEDURE — 71046 X-RAY EXAM CHEST 2 VIEWS: CPT | Mod: TC | Performed by: RADIOLOGY

## 2024-12-11 RX ORDER — AZITHROMYCIN 250 MG/1
TABLET, FILM COATED ORAL
Qty: 6 TABLET | Refills: 0 | Status: SHIPPED | OUTPATIENT
Start: 2024-12-11 | End: 2024-12-16

## 2024-12-11 NOTE — PROGRESS NOTES
Assessment & Plan:      Problem List Items Addressed This Visit    None  Visit Diagnoses       Atypical pneumonia    -  Primary    Relevant Medications    azithromycin (ZITHROMAX) 250 MG tablet    Other Relevant Orders    XR Chest 2 Views          Medical Decision Making  Patient presents with worsening cough over the course of 2 weeks.  Chest x-ray is negative for signs of focal infiltrates.  Symptoms today concerning for atypical pneumonia. Recommend oral antibiotics.  Discussed treatment and symptomatic care.  Allergies and medication interactions reviewed.  Discussed signs of worsening symptoms and when to follow-up with PCP if no symptom improvement.     Subjective:      Paulette Singer is a 39 year old female here for evaluation of worsening cough.  Onset of symptoms was 2 weeks ago with acute worsening last night.  Patient will cough almost to the point of emesis.  Otherwise no obvious fevers.  Cough is productive.    The following portions of the patient's history were reviewed and updated as appropriate: allergies, current medications, and problem list.     Review of Systems  Pertinent items are noted in HPI.    Allergies  Allergies   Allergen Reactions    Amoxicillin Rash    Pcn [Penicillins] Rash       Family History   Problem Relation Age of Onset    Depression Mother     Thyroid Disease Mother     Gynecology Sister         endometriosis    Cancer Paternal Grandmother         eye       Social History     Tobacco Use    Smoking status: Never     Passive exposure: Never    Smokeless tobacco: Never   Substance Use Topics    Alcohol use: Yes     Alcohol/week: 0.0 standard drinks of alcohol     Comment: glass a week        Objective:      /81   Pulse 74   Temp 98.1  F (36.7  C)   Resp 16   Wt 70.3 kg (155 lb)   LMP 07/30/2018   SpO2 97%   BMI 24.28 kg/m    General appearance - alert, well appearing, and in no distress and non-toxic  Ears - bilateral TM's and external ear canals normal  Nose -  normal and patent, no erythema, discharge or polyps  Mouth - mucous membranes moist, pharynx normal without lesions  Neck - supple, no significant adenopathy  Chest - clear to auscultation, no wheezes, rales or rhonchi, symmetric air entry  Heart - normal rate, regular rhythm, normal S1, S2, no murmurs, rubs, clicks or gallops     Lab & Imaging Results    No results found for any visits on 12/11/24.    I personally reviewed these results and discussed findings with the patient.    The use of Dragon/Cliptone dictation services was used to construct the content of this note; any grammatical errors are non-intentional. Please contact the author directly if you are in need of any clarification.

## 2025-06-21 ENCOUNTER — HEALTH MAINTENANCE LETTER (OUTPATIENT)
Age: 40
End: 2025-06-21

## 2025-08-16 ENCOUNTER — HOSPITAL ENCOUNTER (EMERGENCY)
Facility: CLINIC | Age: 40
Discharge: HOME | End: 2025-08-17
Attending: EMERGENCY MEDICINE
Payer: COMMERCIAL

## 2025-08-16 ENCOUNTER — APPOINTMENT (OUTPATIENT)
Dept: CT IMAGING | Facility: CLINIC | Age: 40
End: 2025-08-16
Attending: EMERGENCY MEDICINE
Payer: COMMERCIAL

## 2025-08-16 VITALS
SYSTOLIC BLOOD PRESSURE: 128 MMHG | OXYGEN SATURATION: 99 % | TEMPERATURE: 98.6 F | BODY MASS INDEX: 27 KG/M2 | HEIGHT: 67 IN | WEIGHT: 172 LBS | RESPIRATION RATE: 20 BRPM | DIASTOLIC BLOOD PRESSURE: 75 MMHG | HEART RATE: 69 BPM

## 2025-08-16 DIAGNOSIS — N20.0 KIDNEY STONE: Primary | ICD-10-CM

## 2025-08-16 DIAGNOSIS — Q62.5 DOUBLE URETER ON LEFT: ICD-10-CM

## 2025-08-16 LAB
ALBUMIN UR-MCNC: NEGATIVE MG/DL
ANION GAP SERPL CALCULATED.3IONS-SCNC: 10 MMOL/L (ref 7–15)
APPEARANCE UR: CLEAR
BASOPHILS # BLD AUTO: 0.05 10E3/UL (ref 0–0.2)
BASOPHILS NFR BLD AUTO: 0.5 %
BILIRUB UR QL STRIP: NEGATIVE
BUN SERPL-MCNC: 24.6 MG/DL (ref 6–20)
CALCIUM SERPL-MCNC: 9.1 MG/DL (ref 8.8–10.4)
CHLORIDE SERPL-SCNC: 104 MMOL/L (ref 98–107)
COLOR UR AUTO: ABNORMAL
CREAT SERPL-MCNC: 0.96 MG/DL (ref 0.51–0.95)
EGFRCR SERPLBLD CKD-EPI 2021: 77 ML/MIN/1.73M2
EOSINOPHIL # BLD AUTO: 0.14 10E3/UL (ref 0–0.7)
EOSINOPHIL NFR BLD AUTO: 1.5 %
ERYTHROCYTE [DISTWIDTH] IN BLOOD BY AUTOMATED COUNT: 11.9 % (ref 10–15)
GLUCOSE SERPL-MCNC: 108 MG/DL (ref 70–99)
GLUCOSE UR STRIP-MCNC: NEGATIVE MG/DL
HCO3 SERPL-SCNC: 24 MMOL/L (ref 22–29)
HCT VFR BLD AUTO: 44.4 % (ref 35–47)
HGB BLD-MCNC: 14.9 G/DL (ref 11.7–15.7)
HGB UR QL STRIP: NEGATIVE
IMM GRANULOCYTES # BLD: 0.03 10E3/UL
IMM GRANULOCYTES NFR BLD: 0.3 %
KETONES UR STRIP-MCNC: NEGATIVE MG/DL
LEUKOCYTE ESTERASE UR QL STRIP: NEGATIVE
LYMPHOCYTES # BLD AUTO: 3.43 10E3/UL (ref 0.8–5.3)
LYMPHOCYTES NFR BLD AUTO: 36 %
MCH RBC QN AUTO: 29.7 PG (ref 26.5–33)
MCHC RBC AUTO-ENTMCNC: 33.6 G/DL (ref 31.5–36.5)
MCV RBC AUTO: 88.4 FL (ref 78–100)
MONOCYTES # BLD AUTO: 0.62 10E3/UL (ref 0–1.3)
MONOCYTES NFR BLD AUTO: 6.5 %
MUCOUS THREADS #/AREA URNS LPF: PRESENT /LPF
NEUTROPHILS # BLD AUTO: 5.27 10E3/UL (ref 1.6–8.3)
NEUTROPHILS NFR BLD AUTO: 55.2 %
NITRATE UR QL: NEGATIVE
NRBC # BLD AUTO: <0.03 10E3/UL
NRBC BLD AUTO-RTO: 0 /100
PH UR STRIP: 6 [PH] (ref 5–7)
PLATELET # BLD AUTO: 219 10E3/UL (ref 150–450)
POTASSIUM SERPL-SCNC: 4.1 MMOL/L (ref 3.4–5.3)
RBC # BLD AUTO: 5.02 10E6/UL (ref 3.8–5.2)
RBC URINE: 1 /HPF
SODIUM SERPL-SCNC: 138 MMOL/L (ref 135–145)
SP GR UR STRIP: 1.03 (ref 1–1.03)
SQUAMOUS EPITHELIAL: 4 /HPF
UROBILINOGEN UR STRIP-MCNC: NORMAL MG/DL
WBC # BLD AUTO: 9.54 10E3/UL (ref 4–11)
WBC URINE: 1 /HPF

## 2025-08-16 PROCEDURE — 80048 BASIC METABOLIC PNL TOTAL CA: CPT | Performed by: EMERGENCY MEDICINE

## 2025-08-16 PROCEDURE — 74176 CT ABD & PELVIS W/O CONTRAST: CPT

## 2025-08-16 PROCEDURE — 250N000011 HC RX IP 250 OP 636: Performed by: EMERGENCY MEDICINE

## 2025-08-16 PROCEDURE — 99285 EMERGENCY DEPT VISIT HI MDM: CPT | Mod: 25 | Performed by: EMERGENCY MEDICINE

## 2025-08-16 PROCEDURE — 85025 COMPLETE CBC W/AUTO DIFF WBC: CPT | Performed by: EMERGENCY MEDICINE

## 2025-08-16 PROCEDURE — 36415 COLL VENOUS BLD VENIPUNCTURE: CPT | Performed by: EMERGENCY MEDICINE

## 2025-08-16 PROCEDURE — 96374 THER/PROPH/DIAG INJ IV PUSH: CPT

## 2025-08-16 PROCEDURE — 81001 URINALYSIS AUTO W/SCOPE: CPT | Performed by: EMERGENCY MEDICINE

## 2025-08-16 RX ORDER — KETOROLAC TROMETHAMINE 30 MG/ML
30 INJECTION, SOLUTION INTRAMUSCULAR; INTRAVENOUS ONCE
Status: COMPLETED | OUTPATIENT
Start: 2025-08-16 | End: 2025-08-16

## 2025-08-16 RX ADMIN — KETOROLAC TROMETHAMINE 30 MG: 30 INJECTION, SOLUTION INTRAMUSCULAR at 23:06

## 2025-08-16 ASSESSMENT — COLUMBIA-SUICIDE SEVERITY RATING SCALE - C-SSRS
1. IN THE PAST MONTH, HAVE YOU WISHED YOU WERE DEAD OR WISHED YOU COULD GO TO SLEEP AND NOT WAKE UP?: NO
6. HAVE YOU EVER DONE ANYTHING, STARTED TO DO ANYTHING, OR PREPARED TO DO ANYTHING TO END YOUR LIFE?: NO
2. HAVE YOU ACTUALLY HAD ANY THOUGHTS OF KILLING YOURSELF IN THE PAST MONTH?: NO

## 2025-08-16 ASSESSMENT — ACTIVITIES OF DAILY LIVING (ADL)
ADLS_ACUITY_SCORE: 51
ADLS_ACUITY_SCORE: 51

## 2025-08-17 RX ORDER — KETOROLAC TROMETHAMINE 10 MG/1
10 TABLET, FILM COATED ORAL EVERY 6 HOURS PRN
Qty: 20 TABLET | Refills: 0 | Status: SHIPPED | OUTPATIENT
Start: 2025-08-17

## (undated) DEVICE — SU MONOCRYL 4-0 PS-2 18" UND Y496G

## (undated) DEVICE — PAD CHUX UNDERPAD 23X24" 7136

## (undated) DEVICE — DAVINCI XI DRAPE COLUMN 470341

## (undated) DEVICE — GLOVE PROTEXIS MICRO 7.5  2D73PM75

## (undated) DEVICE — PACK VAG HYST

## (undated) DEVICE — SU VICRYL 0 UR-6 27" J603H

## (undated) DEVICE — COVER SLEEVE UNIV LF 89791

## (undated) DEVICE — CATH URETERAL OPEN END 6FR AXXCESS

## (undated) DEVICE — DAVINCI HOT SHEARS TIP COVER  400180

## (undated) DEVICE — SOL NACL 0.9% INJ 250ML BAG 2B1322Q

## (undated) DEVICE — TUBING SUCTION MEDI-VAC 1/4"X20' N620A

## (undated) DEVICE — PACK CYSTOSCOPY SBA15CYFSI

## (undated) DEVICE — DRAIN JACKSON PRATT CHANNEL 19FR ROUND HUBLESS SIL JP-2230

## (undated) DEVICE — STENT URETERAL DBL PIGTAIL INLAY 6FRX28CM 778628: Type: IMPLANTABLE DEVICE | Site: URETER | Status: NON-FUNCTIONAL

## (undated) DEVICE — GLOVE PROTEXIS W/NEU-THERA 7.5  2D73TE75

## (undated) DEVICE — NDL SPINAL 22GA 3.5" QUINCKE 405181

## (undated) DEVICE — SOL NACL 0.9% INJ 1000ML BAG 2B1324X

## (undated) DEVICE — SU SILK 2-0 SH 30" K833H

## (undated) DEVICE — SOL WATER IRRIG 1000ML BOTTLE 2F7114

## (undated) DEVICE — BAG CYSTO TABLE DRAIN

## (undated) DEVICE — SOL NACL 0.9% IRRIG 3000ML BAG 2B7477

## (undated) DEVICE — BASKET STONE RETRIEVAL NTNL ZERO TIP 1.9FRX90CM M0063901050

## (undated) DEVICE — SOL NACL 0.9% IRRIG 1000ML BOTTLE 2F7124

## (undated) DEVICE — SU VICRYL 0 CT-1 36" J946H

## (undated) DEVICE — DAVINCI XI DRAPE ARM 470015

## (undated) DEVICE — SUCTION CANISTER MEDIVAC LINER 3000ML W/LID 65651-530

## (undated) DEVICE — WIRE GUIDE 0.035"X145CM BENTSON TSFB-35-145-BH

## (undated) DEVICE — SU VICRYL 0 CT-2 27" J334H

## (undated) DEVICE — SUCTION TIP YANKAUER W/O VENT K86

## (undated) DEVICE — PAD FOAM EGGCRATE 31163457

## (undated) DEVICE — SUCTION IRR STRYKERFLOW II W/TIP 250-070-520

## (undated) DEVICE — RETR RING LONE STAR 28.3X18.3CM W/CATH CLIPSX2 3308G

## (undated) DEVICE — SOL WATER IRRIG 3000ML BAG 2B7117

## (undated) DEVICE — GUIDEWIRE ZIPWIRE STD STR .035"X150CM M006630205B0

## (undated) DEVICE — GLOVE PROTEXIS BLUE W/NEU-THERA 7.5  2D73EB75

## (undated) DEVICE — DAVINCI XI SEAL UNIVERSAL 5-8MM 470361

## (undated) DEVICE — GUIDEWIRE SENSOR DUAL FLEX STR 0.035"X150CM M0066703080

## (undated) DEVICE — GLOVE PROTEXIS BLUE W/NEU-THERA 8.0  2D73EB80

## (undated) DEVICE — NDL INSUFFLATION 13GA 120MM C2201

## (undated) DEVICE — CATH TRAY FOLEY 16FR BARDEX W/DRAIN BAG STATLOCK 300316A

## (undated) DEVICE — PACK DAVINCI UROLOGY SBA15UDFSG

## (undated) DEVICE — LIGHT HANDLE X2

## (undated) DEVICE — TAPE DURAPORE 3" SILK 1538-3

## (undated) DEVICE — Device

## (undated) DEVICE — ESU GROUND PAD UNIVERSAL W/O CORD

## (undated) DEVICE — TUBING VITAL VUE IRR TIP 8886828006

## (undated) DEVICE — LINEN TOWEL PACK X5 5464

## (undated) DEVICE — DRAIN JACKSON PRATT RESERVOIR 100ML SU130-1305

## (undated) DEVICE — GUIDEWIRE SENSOR DUAL FLEX ANG 0.035"X150CM M0066703010

## (undated) DEVICE — NDL 25GA 1.5" 305127

## (undated) DEVICE — SU VICRYL 0 TIE 54" J608H

## (undated) DEVICE — PREP SKIN SCRUB TRAY 4461A

## (undated) DEVICE — PAD CHUX UNDERPAD 30X36" P3036C

## (undated) DEVICE — CLIP ENDO HEMO-LOC PURPLE LG 544240

## (undated) DEVICE — SU VICRYL 2-0 CT-1 27" UND J259H

## (undated) DEVICE — TUBING CONMED AIRSEAL SMOKE EVAC INSUFFLATION ASM-EVAC

## (undated) DEVICE — STRAP STIRRUP W/SLIP 30187-030

## (undated) DEVICE — BLADE KNIFE SURG 15 371115

## (undated) DEVICE — CATH URETERAL FLEX TIP TIGERTAIL 06FRX70CM 139006

## (undated) DEVICE — SYR BULB IRRIG 50ML LATEX FREE 0035280

## (undated) DEVICE — SUCTION MANIFOLD NEPTUNE 2 SYS 1 PORT 702-025-000

## (undated) DEVICE — GLOVE PROTEXIS W/NEU-THERA 8.0  2D73TE80

## (undated) DEVICE — PANTIES MESH LG/XLG 2PK 706M2

## (undated) DEVICE — PAD CHUX UNDERPAD 30X30"

## (undated) DEVICE — GLOVE PROTEXIS W/NEU-THERA 7.0  2D73TE70

## (undated) DEVICE — SU VICRYL 4-0 RB-1 27" J304

## (undated) DEVICE — SU SILK 2-0 FSL 18" 677G

## (undated) DEVICE — SU VICRYL 2-0 CT-2 CR 8X18" J726D

## (undated) DEVICE — RAD RX ISOVUE 300 (50ML) 61% IOPAMIDOL CHARGE PER ML

## (undated) RX ORDER — CLINDAMYCIN PHOSPHATE 900 MG/50ML
INJECTION, SOLUTION INTRAVENOUS
Status: DISPENSED
Start: 2018-07-30

## (undated) RX ORDER — ONDANSETRON 2 MG/ML
INJECTION INTRAMUSCULAR; INTRAVENOUS
Status: DISPENSED
Start: 2018-07-30

## (undated) RX ORDER — ONDANSETRON 2 MG/ML
INJECTION INTRAMUSCULAR; INTRAVENOUS
Status: DISPENSED
Start: 2017-06-07

## (undated) RX ORDER — PROPOFOL 10 MG/ML
INJECTION, EMULSION INTRAVENOUS
Status: DISPENSED
Start: 2021-04-12

## (undated) RX ORDER — FENTANYL CITRATE 50 UG/ML
INJECTION, SOLUTION INTRAMUSCULAR; INTRAVENOUS
Status: DISPENSED
Start: 2021-04-12

## (undated) RX ORDER — VANCOMYCIN HYDROCHLORIDE 1 G/200ML
INJECTION, SOLUTION INTRAVENOUS
Status: DISPENSED
Start: 2021-04-12

## (undated) RX ORDER — FENTANYL CITRATE 50 UG/ML
INJECTION, SOLUTION INTRAMUSCULAR; INTRAVENOUS
Status: DISPENSED
Start: 2018-07-30

## (undated) RX ORDER — BUPIVACAINE HYDROCHLORIDE 2.5 MG/ML
INJECTION, SOLUTION EPIDURAL; INFILTRATION; INTRACAUDAL
Status: DISPENSED
Start: 2021-04-12

## (undated) RX ORDER — LIDOCAINE HYDROCHLORIDE 20 MG/ML
INJECTION, SOLUTION EPIDURAL; INFILTRATION; INTRACAUDAL; PERINEURAL
Status: DISPENSED
Start: 2021-04-12

## (undated) RX ORDER — LIDOCAINE HYDROCHLORIDE 20 MG/ML
INJECTION, SOLUTION EPIDURAL; INFILTRATION; INTRACAUDAL; PERINEURAL
Status: DISPENSED
Start: 2021-05-10

## (undated) RX ORDER — HYDROMORPHONE HYDROCHLORIDE 1 MG/ML
INJECTION, SOLUTION INTRAMUSCULAR; INTRAVENOUS; SUBCUTANEOUS
Status: DISPENSED
Start: 2021-04-12

## (undated) RX ORDER — FENTANYL CITRATE 50 UG/ML
INJECTION, SOLUTION INTRAMUSCULAR; INTRAVENOUS
Status: DISPENSED
Start: 2017-06-07

## (undated) RX ORDER — FUROSEMIDE 10 MG/ML
INJECTION INTRAMUSCULAR; INTRAVENOUS
Status: DISPENSED
Start: 2021-02-22

## (undated) RX ORDER — ONDANSETRON 2 MG/ML
INJECTION INTRAMUSCULAR; INTRAVENOUS
Status: DISPENSED
Start: 2021-05-10

## (undated) RX ORDER — ACETAMINOPHEN 325 MG/1
TABLET ORAL
Status: DISPENSED
Start: 2021-04-12

## (undated) RX ORDER — SCOLOPAMINE TRANSDERMAL SYSTEM 1 MG/1
PATCH, EXTENDED RELEASE TRANSDERMAL
Status: DISPENSED
Start: 2021-04-12

## (undated) RX ORDER — DEXAMETHASONE SODIUM PHOSPHATE 4 MG/ML
INJECTION, SOLUTION INTRA-ARTICULAR; INTRALESIONAL; INTRAMUSCULAR; INTRAVENOUS; SOFT TISSUE
Status: DISPENSED
Start: 2017-06-07

## (undated) RX ORDER — FENTANYL CITRATE 50 UG/ML
INJECTION, SOLUTION INTRAMUSCULAR; INTRAVENOUS
Status: DISPENSED
Start: 2017-04-19

## (undated) RX ORDER — OXYCODONE HYDROCHLORIDE 5 MG/1
TABLET ORAL
Status: DISPENSED
Start: 2018-07-30

## (undated) RX ORDER — GABAPENTIN 300 MG/1
CAPSULE ORAL
Status: DISPENSED
Start: 2018-07-30

## (undated) RX ORDER — PROPOFOL 10 MG/ML
INJECTION, EMULSION INTRAVENOUS
Status: DISPENSED
Start: 2017-06-07

## (undated) RX ORDER — CEFAZOLIN SODIUM 2 G/100ML
INJECTION, SOLUTION INTRAVENOUS
Status: DISPENSED
Start: 2021-05-10

## (undated) RX ORDER — BUPIVACAINE HYDROCHLORIDE 2.5 MG/ML
INJECTION, SOLUTION EPIDURAL; INFILTRATION; INTRACAUDAL
Status: DISPENSED
Start: 2018-07-30

## (undated) RX ORDER — FENTANYL CITRATE 50 UG/ML
INJECTION, SOLUTION INTRAMUSCULAR; INTRAVENOUS
Status: DISPENSED
Start: 2021-05-10

## (undated) RX ORDER — LIDOCAINE HYDROCHLORIDE 20 MG/ML
INJECTION, SOLUTION EPIDURAL; INFILTRATION; INTRACAUDAL; PERINEURAL
Status: DISPENSED
Start: 2017-06-07

## (undated) RX ORDER — CLINDAMYCIN PHOSPHATE 900 MG/50ML
INJECTION, SOLUTION INTRAVENOUS
Status: DISPENSED
Start: 2017-06-07

## (undated) RX ORDER — ACETAMINOPHEN 325 MG/1
TABLET ORAL
Status: DISPENSED
Start: 2018-07-30

## (undated) RX ORDER — CLINDAMYCIN PHOSPHATE 900 MG/50ML
INJECTION, SOLUTION INTRAVENOUS
Status: DISPENSED
Start: 2017-04-19

## (undated) RX ORDER — ONDANSETRON 2 MG/ML
INJECTION INTRAMUSCULAR; INTRAVENOUS
Status: DISPENSED
Start: 2021-04-12

## (undated) RX ORDER — GENTAMICIN SULFATE 80 MG/100ML
INJECTION, SOLUTION INTRAVENOUS
Status: DISPENSED
Start: 2021-04-12

## (undated) RX ORDER — PROPOFOL 10 MG/ML
INJECTION, EMULSION INTRAVENOUS
Status: DISPENSED
Start: 2021-05-10

## (undated) RX ORDER — GENTAMICIN 40 MG/ML
INJECTION, SOLUTION INTRAMUSCULAR; INTRAVENOUS
Status: DISPENSED
Start: 2018-07-30

## (undated) RX ORDER — DEXAMETHASONE SODIUM PHOSPHATE 4 MG/ML
INJECTION, SOLUTION INTRA-ARTICULAR; INTRALESIONAL; INTRAMUSCULAR; INTRAVENOUS; SOFT TISSUE
Status: DISPENSED
Start: 2021-04-12

## (undated) RX ORDER — EPINEPHRINE 1 MG/ML
INJECTION, SOLUTION, CONCENTRATE INTRAVENOUS
Status: DISPENSED
Start: 2018-07-30

## (undated) RX ORDER — FUROSEMIDE 10 MG/ML
INJECTION INTRAMUSCULAR; INTRAVENOUS
Status: DISPENSED
Start: 2021-07-19

## (undated) RX ORDER — LIDOCAINE HYDROCHLORIDE 10 MG/ML
INJECTION, SOLUTION EPIDURAL; INFILTRATION; INTRACAUDAL; PERINEURAL
Status: DISPENSED
Start: 2018-07-30

## (undated) RX ORDER — HYDROXYZINE HYDROCHLORIDE 25 MG/1
TABLET, FILM COATED ORAL
Status: DISPENSED
Start: 2021-04-12

## (undated) RX ORDER — APREPITANT 40 MG/1
CAPSULE ORAL
Status: DISPENSED
Start: 2021-04-12